# Patient Record
Sex: MALE | Race: BLACK OR AFRICAN AMERICAN | Employment: FULL TIME | URBAN - METROPOLITAN AREA
[De-identification: names, ages, dates, MRNs, and addresses within clinical notes are randomized per-mention and may not be internally consistent; named-entity substitution may affect disease eponyms.]

---

## 2018-03-23 ENCOUNTER — HOSPITAL ENCOUNTER (EMERGENCY)
Age: 48
Discharge: HOME | End: 2018-03-23
Attending: EMERGENCY MEDICINE
Payer: MEDICAID

## 2018-03-23 ENCOUNTER — APPOINTMENT (OUTPATIENT)
Dept: GENERAL RADIOLOGY | Age: 48
End: 2018-03-23
Payer: MEDICAID

## 2018-03-23 VITALS
DIASTOLIC BLOOD PRESSURE: 104 MMHG | BODY MASS INDEX: 33.51 KG/M2 | SYSTOLIC BLOOD PRESSURE: 162 MMHG | OXYGEN SATURATION: 97 % | HEIGHT: 73 IN | HEART RATE: 75 BPM | RESPIRATION RATE: 18 BRPM | TEMPERATURE: 98 F | WEIGHT: 252.87 LBS

## 2018-03-23 DIAGNOSIS — M25.512 ACUTE PAIN OF LEFT SHOULDER: ICD-10-CM

## 2018-03-23 DIAGNOSIS — R03.0 ELEVATED BLOOD PRESSURE READING: ICD-10-CM

## 2018-03-23 DIAGNOSIS — M54.50 ACUTE BILATERAL LOW BACK PAIN WITHOUT SCIATICA: Primary | ICD-10-CM

## 2018-03-23 PROCEDURE — 99282 EMERGENCY DEPT VISIT SF MDM: CPT

## 2018-03-23 PROCEDURE — 73030 X-RAY EXAM OF SHOULDER: CPT

## 2018-03-23 PROCEDURE — 72100 X-RAY EXAM L-S SPINE 2/3 VWS: CPT

## 2018-03-23 RX ORDER — HYDROCODONE BITARTRATE AND ACETAMINOPHEN 5; 325 MG/1; MG/1
1 TABLET ORAL
Qty: 12 TAB | Refills: 0 | Status: SHIPPED | OUTPATIENT
Start: 2018-03-23 | End: 2019-04-30

## 2018-03-23 RX ORDER — METHOCARBAMOL 750 MG/1
750 TABLET, FILM COATED ORAL 3 TIMES DAILY
Qty: 15 TAB | Refills: 0 | Status: SHIPPED | OUTPATIENT
Start: 2018-03-23 | End: 2018-03-28

## 2018-03-23 RX ORDER — IBUPROFEN 600 MG/1
600 TABLET ORAL
Qty: 20 TAB | Refills: 0 | Status: SHIPPED | OUTPATIENT
Start: 2018-03-23 | End: 2019-04-30

## 2018-03-23 NOTE — Clinical Note
Rest, alternate ice/moist heat, gentle stretching, massage. Avoid prolonged sitting. Follow up with primary care for recheck. Contact information provided for Orthopedist if symptoms persist.  Return to the Emergency Dept for any continued/worsening p ain.

## 2018-03-23 NOTE — LETTER
[address] 
[facility] EMERGENCY DEPT 
[address] [facility] [id number] [phone] Work/School Note Date: 3/23/2018 To Whom It May concern: 
 
Madelin Cousin was seen and treated today in the emergency room. He may return to work in 2 to 3 days, as symptoms improve. Sincerely, Francesca Elise

## 2018-03-24 NOTE — ED NOTES
Patient arrived to ED via ambulation with C/O left shoulder pain and lower back pain. Patient stated he was doing some heavy lifting while at work. Patients vital signs are stable at this time.  Waiting for evaluation from PA/MD.

## 2018-03-24 NOTE — ED PROVIDER NOTES
EMERGENCY DEPARTMENT HISTORY AND PHYSICAL EXAM      Date: 3/23/2018  Patient Name: Kai Triana    History of Presenting Illness     Chief Complaint   Patient presents with    Shoulder Pain     x2 weeks; worse at night    Back Pain     L side: patient reports lifing something heavy today and increased pain       History Provided By: Patient    HPI: Kai Triana, 50 y.o. male with no significant PMHx, presents ambulatory to the ED with cc of constant left sided lower back pain which started today after heavy lifting at work. Pt describes the pain as a tightness. His associated pain is moderate. He explains his pain is worse with standing. He also c/o left shoulder pain x 2 weeks. Pt reports no history of similar symptoms. He denies neck pain, rash, fever, cough, and SOB. Pt is otherwise without complaint. PCP: None    There are no other complaints, changes, or physical findings at this time. Past History     Past Medical History:  No past medical history on file. Past Surgical History:  No past surgical history on file. Family History:  No family history on file. Social History:  Social History   Substance Use Topics    Smoking status: Not on file    Smokeless tobacco: Not on file    Alcohol use Not on file       Allergies:  No Known Allergies      Review of Systems   Review of Systems   Constitutional: Negative for chills and fever. HENT: Negative for congestion, rhinorrhea and sore throat. Respiratory: Negative for cough and shortness of breath. Cardiovascular: Negative for chest pain and palpitations. Gastrointestinal: Negative for abdominal pain, diarrhea, nausea and vomiting. Endocrine: Negative for polydipsia, polyphagia and polyuria. Genitourinary: Negative for dysuria and hematuria. Musculoskeletal: Positive for arthralgias (left shoulder) and back pain (lower). Negative for neck pain and neck stiffness. Skin: Negative for rash and wound. Allergic/Immunologic: Negative for food allergies and immunocompromised state. Neurological: Negative for dizziness, weakness, numbness and headaches. Psychiatric/Behavioral: Negative for agitation and confusion. The patient is not nervous/anxious. Physical Exam   Physical Exam   Constitutional: He is oriented to person, place, and time. He appears well-developed and well-nourished. No distress. WDWN AA male, alert, in NAD   HENT:   Head: Normocephalic and atraumatic. Nose: Nose normal.   Mouth/Throat: Oropharynx is clear and moist. No oropharyngeal exudate. Eyes: Conjunctivae and EOM are normal. Right eye exhibits no discharge. Left eye exhibits no discharge. No scleral icterus. Neck: Normal range of motion. Neck supple. No JVD present. No tracheal deviation present. No thyromegaly present. No cervical spinal tenderness   Cardiovascular: Normal rate, regular rhythm and normal heart sounds. Pulmonary/Chest: Effort normal and breath sounds normal. No respiratory distress. He has no wheezes. Abdominal: Soft. There is no tenderness. No CVAT   Musculoskeletal: He exhibits tenderness. He exhibits no edema or deformity. Left shoulder: Decreased A/P ROM to left shoulder    Due to tenderness with palpation/movement, no deformity, no crepitus, no erythema/rash/lesion/heat. 2+ distal pulses, NVI, sensation grossly intact to light touch. Decreased A/P ROM to lumbar region with palpation and movement. No deformity noted. No midline spinal tenderness. Pt observed to ambulate without deficit. 2+ distal pulses, NVI. Sensation grossly intact to light touch. Lymphadenopathy:     He has no cervical adenopathy. Neurological: He is alert and oriented to person, place, and time. He exhibits normal muscle tone. Coordination normal.   Skin: Skin is warm and dry. No rash noted. He is not diaphoretic. No erythema. Psychiatric: He has a normal mood and affect.  His behavior is normal. Judgment normal.   Nursing note and vitals reviewed. Diagnostic Study Results     Radiologic Studies -   XR SHOULDER LT AP/LAT MIN 2 V   Final Result   EXAM:  XR SHOULDER LT AP/LAT MIN 2 V     INDICATION:   l shoulder pain.     COMPARISON: None.     FINDINGS: Three views of the left shoulder demonstrate no fracture, dislocation  or other acute abnormality.     IMPRESSION  IMPRESSION:  No acute abnormality. XR SPINE LUMB 2 OR 3 V   Final Result   INDICATION: low back pain     EXAM: Lumbar spine radiographs, 3 views.     COMPARISON:  None .     FINDINGS: A three-view examination of the lumbar spine reveals normal bony  alignment. Bone mineral content is normal for age . There is no obvious acute  fracture or dislocation. Vertebral body heights  and disc space heights   are  preserved. .  Pedicles and sacroiliac joints are intact, as are visualized  sacral foramina.     IMPRESSION  IMPRESSION: No acute bony abnormality of the lumbar spine. Medical Decision Making   I am the first provider for this patient. I reviewed the vital signs, available nursing notes, past medical history, past surgical history, family history and social history. Vital Signs-Reviewed the patient's vital signs. Patient Vitals for the past 12 hrs:   Temp Pulse Resp BP SpO2   03/23/18 1841 98 °F (36.7 °C) 75 18 (!) 162/104 97 %     Records Reviewed: Nursing Notes and Old Medical Records    Provider Notes (Medical Decision Making):   DDx: strain, DDD, rotator cuff disorder    ED Course:   Initial assessment performed. The patients presenting problems have been discussed, and they are in agreement with the care plan formulated and outlined with them. I have encouraged them to ask questions as they arise throughout their visit. Disposition:  DISCHARGE NOTE  8:44 PM  The patient has been re-evaluated and is ready for discharge. Reviewed available results with patient. Counseled patient on diagnosis and care plan.  Patient has expressed understanding, and all questions have been answered. Patient agrees with plan and agrees to follow up as recommended, or return to the ED if their symptoms worsen. Discharge instructions have been provided and explained to the patient, along with reasons to return to the ED. PLAN:  1. Current Discharge Medication List      START taking these medications    Details   methocarbamol (ROBAXIN) 750 mg tablet Take 1 Tab by mouth three (3) times daily for 5 days. Qty: 15 Tab, Refills: 0      HYDROcodone-acetaminophen (NORCO) 5-325 mg per tablet Take 1 Tab by mouth every six (6) hours as needed for Pain for up to 12 doses. Max Daily Amount: 4 Tabs. Qty: 12 Tab, Refills: 0    Associated Diagnoses: Acute bilateral low back pain without sciatica; Acute pain of left shoulder      ibuprofen (MOTRIN) 600 mg tablet Take 1 Tab by mouth every six (6) hours as needed for Pain for up to 20 doses. Qty: 20 Tab, Refills: 0           2. Follow-up Information     Follow up With Details Comments [address] MD  As needed [address]  [address] [facility],[address]  [facility] [address].  [phone]      [facility] EMERGENCY DEPT  If symptoms worsen [address] [place] [address]  [phone]        Return to ED if worse     Diagnosis     Clinical Impression:   1. Acute bilateral low back pain without sciatica    2. Acute pain of left shoulder    3. Elevated blood pressure reading        Attestations:    Attestation Note:  This note is prepared by JACKI [facility], acting as Scribe for [employer]: The scribe's documentation has been prepared under my direction and personally reviewed by me in its entirety. I confirm that the note above accurately reflects all work, treatment, procedures, and medical decision making performed by me.

## 2018-03-24 NOTE — ED NOTES
Discharge instructions given to patient by PA [facility]. Patient verbalized understanding of discharge instructions. Pt discharged without difficulty. Pt discharged in stable condition via ambulation, accompanied by family.

## 2018-10-26 ENCOUNTER — HOSPITAL ENCOUNTER (OUTPATIENT)
Dept: LAB | Age: 48
Discharge: HOME | End: 2018-10-26

## 2018-10-26 PROCEDURE — 80053 COMPREHEN METABOLIC PANEL: CPT | Performed by: NURSE PRACTITIONER

## 2018-10-26 PROCEDURE — 80061 LIPID PANEL: CPT | Performed by: NURSE PRACTITIONER

## 2018-10-27 LAB
ALBUMIN SERPL-MCNC: 3.9 G/DL (ref 3.5–5)
ALBUMIN/GLOB SERPL: 1 {RATIO} (ref 1.1–2.2)
ALP SERPL-CCNC: 82 U/L (ref 45–117)
ALT SERPL-CCNC: 42 U/L (ref 12–78)
ANION GAP SERPL CALC-SCNC: 9 MMOL/L (ref 5–15)
AST SERPL-CCNC: 19 U/L (ref 15–37)
BILIRUB SERPL-MCNC: 0.5 MG/DL (ref 0.2–1)
BUN SERPL-MCNC: 17 MG/DL (ref 6–20)
BUN/CREAT SERPL: 16 (ref 12–20)
CALCIUM SERPL-MCNC: 9.1 MG/DL (ref 8.5–10.1)
CHLORIDE SERPL-SCNC: 103 MMOL/L (ref 97–108)
CHOLEST SERPL-MCNC: 193 MG/DL
CO2 SERPL-SCNC: 26 MMOL/L (ref 21–32)
CREAT SERPL-MCNC: 1.05 MG/DL (ref 0.7–1.3)
GLOBULIN SER CALC-MCNC: 4.1 G/DL (ref 2–4)
GLUCOSE SERPL-MCNC: 94 MG/DL (ref 65–100)
HDLC SERPL-MCNC: 60 MG/DL
HDLC SERPL: 3.2 {RATIO} (ref 0–5)
LDLC SERPL CALC-MCNC: 103.8 MG/DL (ref 0–100)
LIPID PROFILE,FLP: ABNORMAL
POTASSIUM SERPL-SCNC: 4.8 MMOL/L (ref 3.5–5.1)
PROT SERPL-MCNC: 8 G/DL (ref 6.4–8.2)
SODIUM SERPL-SCNC: 138 MMOL/L (ref 136–145)
TRIGL SERPL-MCNC: 146 MG/DL (ref ?–150)
VLDLC SERPL CALC-MCNC: 29.2 MG/DL

## 2019-04-30 ENCOUNTER — HOSPITAL ENCOUNTER (EMERGENCY)
Age: 49
Discharge: HOME | End: 2019-04-30
Attending: EMERGENCY MEDICINE
Payer: MEDICAID

## 2019-04-30 ENCOUNTER — APPOINTMENT (OUTPATIENT)
Dept: GENERAL RADIOLOGY | Age: 49
End: 2019-04-30
Attending: EMERGENCY MEDICINE
Payer: MEDICAID

## 2019-04-30 VITALS
HEIGHT: 74 IN | TEMPERATURE: 98 F | WEIGHT: 246.47 LBS | OXYGEN SATURATION: 99 % | RESPIRATION RATE: 16 BRPM | SYSTOLIC BLOOD PRESSURE: 151 MMHG | BODY MASS INDEX: 31.63 KG/M2 | HEART RATE: 70 BPM | DIASTOLIC BLOOD PRESSURE: 97 MMHG

## 2019-04-30 DIAGNOSIS — M10.9 ACUTE GOUT INVOLVING TOE OF LEFT FOOT, UNSPECIFIED CAUSE: Primary | ICD-10-CM

## 2019-04-30 PROCEDURE — 73660 X-RAY EXAM OF TOE(S): CPT

## 2019-04-30 PROCEDURE — 99283 EMERGENCY DEPT VISIT LOW MDM: CPT

## 2019-04-30 PROCEDURE — 74011636637 HC RX REV CODE- 636/637: Performed by: EMERGENCY MEDICINE

## 2019-04-30 PROCEDURE — 74011250637 HC RX REV CODE- 250/637: Performed by: EMERGENCY MEDICINE

## 2019-04-30 RX ORDER — NAPROXEN 500 MG/1
500 TABLET ORAL 2 TIMES DAILY WITH MEALS
Qty: 20 TAB | Refills: 0 | Status: SHIPPED | OUTPATIENT
Start: 2019-04-30 | End: 2019-05-10

## 2019-04-30 RX ORDER — PREDNISONE 20 MG/1
60 TABLET ORAL ONCE
Status: COMPLETED | OUTPATIENT
Start: 2019-04-30 | End: 2019-04-30

## 2019-04-30 RX ORDER — PREDNISONE 20 MG/1
60 TABLET ORAL DAILY
Qty: 12 TAB | Refills: 0 | Status: SHIPPED | OUTPATIENT
Start: 2019-04-30 | End: 2019-05-04

## 2019-04-30 RX ORDER — IBUPROFEN 400 MG/1
800 TABLET ORAL
Status: COMPLETED | OUTPATIENT
Start: 2019-04-30 | End: 2019-04-30

## 2019-04-30 RX ADMIN — IBUPROFEN 800 MG: 400 TABLET, FILM COATED ORAL at 05:21

## 2019-04-30 RX ADMIN — PREDNISONE 60 MG: 20 TABLET ORAL at 05:21

## 2019-04-30 NOTE — ED TRIAGE NOTES
Ambulatory from home with C/O L foot pain/great toe area. Started 1700 yesterday, took Tylenol pain eased but returned at 0200. No injury.

## 2019-04-30 NOTE — ED PROVIDER NOTES
HPI  
 
52year old male without significant pmhx presents with left great toe pain since yesterday. Had a similar episode a year ago, lasted about 1 week, took NSAIDs, warm soaks and it resolved- it was red and hot. This episode feels similar. Never diagnosed with gout. No fevers. No injury. Pain is 10/10 currently. Took tylenol yesterday but none tonight. Drove himself here. History reviewed. No pertinent past medical history. History reviewed. No pertinent surgical history. History reviewed. No pertinent family history. Social History Socioeconomic History  Marital status:  Spouse name: Not on file  Number of children: Not on file  Years of education: Not on file  Highest education level: Not on file Occupational History  Not on file Social Needs  Financial resource strain: Not on file  Food insecurity:  
  Worry: Not on file Inability: Not on file  Transportation needs:  
  Medical: Not on file Non-medical: Not on file Tobacco Use  Smoking status: Never Smoker Substance and Sexual Activity  Alcohol use: Never Frequency: Never  Drug use: Never  Sexual activity: Not on file Lifestyle  Physical activity:  
  Days per week: Not on file Minutes per session: Not on file  Stress: Not on file Relationships  Social connections:  
  Talks on phone: Not on file Gets together: Not on file Attends [employer] service: Not on file Active member of club or organization: Not on file Attends meetings of clubs or organizations: Not on file Relationship status: Not on file  Intimate partner violence:  
  Fear of current or ex partner: Not on file Emotionally abused: Not on file Physically abused: Not on file Forced sexual activity: Not on file Other Topics Concern  Not on file Social History Narrative  Not on file ALLERGIES: Patient has no known allergies. Review of Systems Constitutional: Negative for fever. HENT: Negative for congestion. Eyes: Negative for visual disturbance. Respiratory: Negative for cough and shortness of breath. Cardiovascular: Negative for chest pain. Gastrointestinal: Negative for abdominal pain. Genitourinary: Negative for dysuria. Musculoskeletal: Positive for joint swelling. Skin: Negative for rash. Neurological: Negative for headaches. Psychiatric/Behavioral: Negative for dysphoric mood. Vitals:  
 04/30/19 3090 BP: (!) 147/102 Pulse: 68 Resp: 16 Temp: 98.3 °F (36.8 °C) SpO2: 99% Weight: 111.8 kg (246 lb 7.6 oz) Height: 6' 2\" (1.88 m) Physical Exam  
Constitutional: He is oriented to person, place, and time. He appears well-developed and well-nourished. No distress. HENT:  
Head: Normocephalic and atraumatic. Mouth/Throat: No oropharyngeal exudate. Eyes: Pupils are equal, round, and reactive to light. Right eye exhibits no discharge. Left eye exhibits no discharge. No scleral icterus. Neck: Normal range of motion. Neck supple. No JVD present. Cardiovascular: Normal rate, regular rhythm and normal heart sounds. No murmur heard. Pulmonary/Chest: Effort normal and breath sounds normal. No stridor. No respiratory distress. He has no wheezes. He has no rales. He exhibits no tenderness. Abdominal: Soft. Bowel sounds are normal. He exhibits no distension and no mass. There is no tenderness. There is no rebound and no guarding. Musculoskeletal: Normal range of motion. He exhibits edema (Mcp joint on left, pain to light touch, no erythema or warmth) and tenderness. Neurological: He is oriented to person, place, and time. Skin: Skin is warm and dry. Capillary refill takes less than 2 seconds. No rash noted. Psychiatric: He has a normal mood and affect. His behavior is normal. Judgment and thought content normal.  
  
 
MDM Procedures suspect gout clinically. Will get xray of toe. Start prednisone/NSAIDs. Does not have pcp. Will refer.

## 2019-04-30 NOTE — ED NOTES
4148:  MD reviewed discharge instructions and options with patient and patient verbalized understanding. RN reviewed discharge instructions using teachback method. Pt ambulated to exit without difficulty and in no signs of acute distress escorted by self, and self will drive home. No complaints or needs expressed at this time. Patient was counseled on medications prescribed at discharge. VSS, verbalized relief from most intense pain. Patient to call PCP provided on discharge papers in the morning for appointment.

## 2019-04-30 NOTE — DISCHARGE INSTRUCTIONS
Patient Education      I suspect your symptoms are related to gout. I am treating with Naprosyn twice daily for pain and Prednisone daily for inflammation. Follow up with primary care if your symptoms are not improving- return here if your symptoms/condition is worsening. Gout: Care Instructions  Your Care Instructions    Gout is a form of arthritis caused by a buildup of uric acid crystals in a joint. It causes sudden attacks of pain, swelling, redness, and stiffness, usually in one joint, especially the big toe. Gout usually comes on without a cause. But it can be brought on by drinking alcohol (especially beer) or eating seafood and red meat. Taking certain medicines, such as diuretics or aspirin, also can bring on an attack of gout. Taking your medicines as prescribed and following up with your doctor regularly can help you avoid gout attacks in the future. Follow-up care is a key part of your treatment and safety. Be sure to make and go to all appointments, and call your doctor if you are having problems. It's also a good idea to know your test results and keep a list of the medicines you take. How can you care for yourself at home? · If the joint is swollen, put ice or a cold pack on the area for 10 to 20 minutes at a time. Put a thin cloth between the ice and your skin. · Prop up the sore limb on a pillow when you ice it or anytime you sit or lie down during the next 3 days. Try to keep it above the level of your heart. This will help reduce swelling. · Rest sore joints. Avoid activities that put weight or strain on the joints for a few days. Take short rest breaks from your regular activities during the day. · Take your medicines exactly as prescribed. Call your doctor if you think you are having a problem with your medicine. · Take pain medicines exactly as directed. ? If the doctor gave you a prescription medicine for pain, take it as prescribed.   ? If you are not taking a prescription pain medicine, ask your doctor if you can take an over-the-counter medicine. · Eat less seafood and red meat. · Check with your doctor before drinking alcohol. · Losing weight, if you are overweight, may help reduce attacks of gout. But do not go on a [employer]. \" Losing a lot of weight in a short amount of time can cause a gout attack. When should you call for help? Call your doctor now or seek immediate medical care if:    · You have a fever.     · The joint is so painful you cannot use it.     · You have sudden, unexplained swelling, redness, warmth, or severe pain in one or more joints.    Watch closely for changes in your health, and be sure to contact your doctor if:    · You have joint pain.     · Your symptoms get worse or are not improving after 2 or 3 days. Where can you learn more? Go to http://hyunPinPayperla.info/. Enter P004 in the search box to learn more about \"Gout: Care Instructions. \"  Current as of: Shaylee 10, 2018  Content Version: 11.9  © 8683-4737 [email or web address]. Care instructions adapted under license by [employer] (which disclaims liability or warranty for this information). If you have questions about a medical condition or this instruction, always ask your healthcare professional. [address] any warranty or liability for your use of this information. Patient Education        Purine-Restricted Diet: Care Instructions  Your Care Instructions    Purines are substances that are found in some foods. Your body turns purines into uric acid. High levels of uric acid can cause gout, which is a form of arthritis that causes pain and inflammation in joints. You may be able to help control the amount of uric acid in your body by limiting high-purine foods in your diet. Follow-up care is a key part of your treatment and safety. Be sure to make and go to all appointments, and call your doctor if you are having problems.  It's also a good idea to know your test results and keep a list of the medicines you take. How can you care for yourself at home? · Plan your meals and snacks around foods that are low in purines and are safe for you to eat. These foods include:  ? Green vegetables and tomatoes. ? Fruits. ? Whole-grain breads, rice, and cereals. ? Eggs, peanut butter, and nuts. ? Low-fat milk, cheese, and other milk products. ? Popcorn. ? Gelatin desserts, chocolate, cocoa, and cakes and sweets, in small amounts. · You can eat certain foods that are medium-high in purines, but eat them only once in a while. These foods include:  ? Legumes, such as dried beans and dried peas. You can have 1 cup cooked legumes each day. ? Asparagus, cauliflower, spinach, mushrooms, and green peas. ? Fish and seafood (other than very high-purine seafood). ? Oatmeal, wheat bran, and wheat germ. · Limit very high-purine foods, including:  ? Organ meats, such as liver, kidneys, sweetbreads, and brains. ? Meats, including romero, beef, pork, and lamb. ? Game meats and any other meats in large amounts. ? Anchovies, sardines, herring, mackerel, and scallops. ? Gravy. ? Beer. Where can you learn more? Go to http://hyun-perla.info/. Enter F448 in the search box to learn more about \"Purine-Restricted Diet: Care Instructions. \"  Current as of: March 28, 2018  Content Version: 11.9  © 3861-2242 [employer]. Care instructions adapted under license by [employer] (which disclaims liability or warranty for this information). If you have questions about a medical condition or this instruction, always ask your healthcare professional. [address] any warranty or liability for your use of this information.

## 2019-05-17 ENCOUNTER — OFFICE VISIT (OUTPATIENT)
Dept: INTERNAL MEDICINE CLINIC | Age: 49
End: 2019-05-17

## 2019-05-17 VITALS
DIASTOLIC BLOOD PRESSURE: 88 MMHG | RESPIRATION RATE: 18 BRPM | SYSTOLIC BLOOD PRESSURE: 131 MMHG | HEIGHT: 74 IN | OXYGEN SATURATION: 95 % | WEIGHT: 250.3 LBS | TEMPERATURE: 98.1 F | BODY MASS INDEX: 32.12 KG/M2 | HEART RATE: 73 BPM

## 2019-05-17 DIAGNOSIS — Z12.11 SCREEN FOR COLON CANCER: ICD-10-CM

## 2019-05-17 DIAGNOSIS — R94.31 ABNORMAL EKG: ICD-10-CM

## 2019-05-17 DIAGNOSIS — E66.9 OBESITY (BMI 30.0-34.9): ICD-10-CM

## 2019-05-17 DIAGNOSIS — Z00.00 PREVENTATIVE HEALTH CARE: Primary | ICD-10-CM

## 2019-05-17 DIAGNOSIS — L30.4 INTERTRIGO: ICD-10-CM

## 2019-05-17 RX ORDER — CLOTRIMAZOLE AND BETAMETHASONE DIPROPIONATE 10; .64 MG/G; MG/G
CREAM TOPICAL 2 TIMES DAILY
Qty: 45 G | Refills: 11 | Status: SHIPPED | OUTPATIENT
Start: 2019-05-17 | End: 2019-10-17 | Stop reason: SDUPTHER

## 2019-05-17 RX ORDER — CLOTRIMAZOLE AND BETAMETHASONE DIPROPIONATE 10; .64 MG/G; MG/G
CREAM TOPICAL 2 TIMES DAILY
Qty: 15 G | Refills: 0 | Status: SHIPPED | OUTPATIENT
Start: 2019-05-17 | End: 2019-05-17 | Stop reason: SDUPTHER

## 2019-05-17 NOTE — PROGRESS NOTES
1. Have you been to the ER, urgent care clinic since your last visit? Hospitalized since your last visit? Yes When: 4-30-19 Where: [facility] Reason for visit: left foot pain 2. Have you seen or consulted any other health care providers outside of the [address] [place] [address] since your last visit? Include any pap smears or colon screening.  No

## 2019-05-17 NOTE — PROGRESS NOTES
SPORTS MEDICINE AND PRIMARY CARE  Deloris Escamilla MD, [address] [address]  Phone:  [phone]  Fax: [phone]    Chief Complaint   Patient presents with   [address]    ED Follow-up       SUBJECTIVE:    Bernadine Hidalgo is a 52 y.o. male Patient returns today with a history of obesity. He is seen as a new patient for evaluation and ongoing care. Patient still has a twinge of pain in his great toe periodically and is seen for evaluation. Past Medical History:   Diagnosis Date    Obesity (BMI 30.0-34. 9)     Preventative health care      History reviewed. No pertinent surgical history.   No Known Allergies    REVIEW OF SYSTEMS:  General: negative for - chills or fever  ENT: negative for - headaches, nasal congestion or tinnitus  Respiratory: negative for - cough, hemoptysis, shortness of breath or wheezing  Cardiovascular : negative for - chest pain, edema, palpitations or shortness of breath  Gastrointestinal: negative for - abdominal pain, blood in stools, heartburn or nausea/vomiting  Genito-Urinary: no dysuria, trouble voiding, or hematuria  Musculoskeletal: negative for - gait disturbance, joint pain, joint stiffness or joint swelling  Neurological: no TIA or stroke symptoms  Hematologic: no bruises, no bleeding, no swollen glands  Integument: no lumps, mole changes, nail changes or rash  Endocrine:no malaise/lethargy or unexpected weight changes      Social History     Socioeconomic History    Marital status:      Spouse name: Not on file    Number of children: Not on file    Years of education: Not on file    Highest education level: Not on file   Tobacco Use    Smoking status: Never Smoker    Smokeless tobacco: Never Used   Substance and Sexual Activity    Alcohol use: Never     Frequency: Never    Drug use: Never    Sexual activity: Yes     Partners: Female     Birth control/protection: None     Family History   Problem Relation Age of Onset    Diabetes Mother      Habits:  He is a lifetime nonsmoker, non drinker, non drug abuser. Social History:  The patient is , lives with his wife. He was born in [place]. He has been in the [address] [place] [address],[address] since 2005. They have three children, two daughters ages 9 and 11, and a son age 3. He completed his ALIA at [employer]. He was in [facility] for five years. 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 Zoroastrianism background is Yazidism. Family History:  Father 76, alive and well. Mother 67 with diabetes. Six brothers and four sisters are alive and well. OBJECTIVE:     Visit Vitals  /88   Pulse 73   Temp 98.1 °F (36.7 °C) (Oral)   Resp 18   Ht 6' 2\" (1.88 m)   Wt 250 lb 4.8 oz (113.5 kg)   SpO2 95%   BMI 32.14 kg/m²     CONSTITUTIONAL: well , well nourished, appears age appropriate  EYES: perrla, eom intact  ENMT:moist mucous membranes, pharynx clear  NECK: supple. Thyroid normal  RESPIRATORY: Chest: clear bilaterally  CARDIOVASCULAR: Heart: regular rate and rhythm  GASTROINTESTINAL: Abdomen: soft, bowel sounds active  HEMATOLOGIC: no pathological lymph nodes palpated  MUSCULOSKELETAL: Extremities: no edema, pulse 1+   INTEGUMENT: No unusual rashes or suspicious skin lesions noted. Nails appear normal.  NEUROLOGIC: non-focal exam   MENTAL STATUS: alert and oriented, appropriate affect     No visits with results within 3 Month(s) from this visit.    Latest known visit with results is:   Hospital Outpatient Visit on 10/26/2018   Component Date Value Ref Range Status    Sodium 10/26/2018 138  136 - 145 mmol/L Final    Potassium 10/26/2018 4.8  3.5 - 5.1 mmol/L Final    Chloride 10/26/2018 103  97 - 108 mmol/L Final    CO2 10/26/2018 26  21 - 32 mmol/L Final    Anion gap 10/26/2018 9  5 - 15 mmol/L Final    Glucose 10/26/2018 94  65 - 100 mg/dL Final    BUN 10/26/2018 17  6 - 20 MG/DL Final    Creatinine 10/26/2018 1.05  0.70 - 1.30 MG/DL Final    BUN/Creatinine ratio 10/26/2018 16  12 - 20   Final    GFR est AA 10/26/2018 >60  >60 ml/min/1.73m2 Final    GFR est non-AA 10/26/2018 >60  >60 ml/min/1.73m2 Final    Comment: Estimated GFR is calculated using the IDMS-traceable Modification of Diet in Renal Disease (MDRD) Study equation, reported for both  Americans (GFRAA) and non- Americans (GFRNA), and normalized to 1.73m2 body surface area. The physician must decide which value applies to the patient. The MDRD study equation should only be used in individuals age 25 or older. It has not been validated for the following: pregnant women, patients with serious comorbid conditions, or on certain medications, or persons with extremes of body size, muscle mass, or nutritional status.  Calcium 10/26/2018 9.1  8.5 - 10.1 MG/DL Final    Bilirubin, total 10/26/2018 0.5  0.2 - 1.0 MG/DL Final    ALT (SGPT) 10/26/2018 42  12 - 78 U/L Final    AST (SGOT) 10/26/2018 19  15 - 37 U/L Final    Alk. phosphatase 10/26/2018 82  45 - 117 U/L Final    Protein, total 10/26/2018 8.0  6.4 - 8.2 g/dL Final    Albumin 10/26/2018 3.9  3.5 - 5.0 g/dL Final    Globulin 10/26/2018 4.1* 2.0 - 4.0 g/dL Final    A-G Ratio 10/26/2018 1.0* 1.1 - 2.2   Final    LIPID PROFILE 10/26/2018        Final    Cholesterol, total 10/26/2018 193  <200 MG/DL Final    Triglyceride 10/26/2018 146  <150 MG/DL Final    Based on NCEP-ATP III:  Triglycerides <150 mg/dL  is considered normal, 150-199 mg/dL  borderline high,  200-499 mg/dL high and  greater than or equal to 500 mg/dL very high.  HDL Cholesterol 10/26/2018 60  MG/DL Final    Based on NCEP ATP III, HDL Cholesterol <40 mg/dL is considered low and >60 mg/dL is elevated.     LDL, calculated 10/26/2018 103.8* 0 - 100 MG/DL Final    Comment: Based on the NCEP-ATP: LDL-C concentrations are considered  optimal <100 mg/dL,  near optimal/above Normal 100-129 mg/dL  Borderline High: 130-159, High: 160-189 mg/dL  Very High: Greater than or equal to 190 mg/dL      VLDL, calculated 10/26/2018 29.2  MG/DL Final    CHOL/HDL Ratio 10/26/2018 3.2  0 - 5.0   Final       ASSESSMENT:   1. Preventative health care    2. Obesity (BMI 30.0-34.9)    3. Intertrigo    4. Screen for colon cancer      Patient's medical status is generally stable. The question of gout is raised for the great toe on the left and for that reason will ask for a uric acid. He complains of intertrigo and we will give him Lotrisone cream to use. If that does not resolve it he will let us know. His BMI represents obesity and we encouraged physical activity 30 minutes five days a week and a heart healthy, weight reducing diet. BP control is at goal.    Lab studies will be sent to him in the mail. He will be back to see us once a year. He is advised he can walk in to see us any time he has an issue and unable to get an appointment and that we use [facility]. Discussed the patient's BMI with him. The BMI follow up plan is as follows:     dietary management education, guidance, and counseling  encourage exercise  monitor weight  prescribed dietary intake    I have discussed the diagnosis with the patient and the intended plan as seen in the  orders above. The patient understands and agees with the plan. The patient has   received an after visit summary and questions were answered concerning  future plans  Patient labs and/or xrays were reviewed  Past records were reviewed.     PLAN:  .  Orders Placed This Encounter    URINALYSIS W/ RFLX MICROSCOPIC    CBC WITH AUTOMATED DIFF    METABOLIC PANEL, COMPREHENSIVE    LIPID PANEL    PROSTATE SPECIFIC AG    HEMOGLOBIN A1C WITH EAG    URIC ACID    REFERRAL TO GASTROENTEROLOGY    AMB POC EKG ROUTINE W/ 12 LEADS, INTER & REP    DISCONTD: clotrimazole-betamethasone (LOTRISONE) topical cream    clotrimazole-betamethasone (LOTRISONE) topical cream       Follow-up and Dispositions    · Return in about 1 year (around 5/17/2020). ATTENTION:   This medical record was transcribed using an electronic medical records system. Although proofread, it may and can contain electronic and spelling errors. Other human spelling and other errors may be present. Corrections may be executed at a later time. Please feel free to contact us for any clarifications as needed.

## 2019-05-17 NOTE — PATIENT INSTRUCTIONS
Body Mass Index: Care Instructions Your Care Instructions Body mass index (BMI) can help you see if your weight is raising your risk for health problems. It uses a formula to compare how much you weigh with how tall you are. · A BMI lower than 18.5 is considered underweight. · A BMI between 18.5 and 24.9 is considered healthy. · A BMI between 25 and 29.9 is considered overweight. A BMI of 30 or higher is considered obese. If your BMI is in the normal range, it means that you have a lower risk for weight-related health problems. If your BMI is in the overweight or obese range, you may be at increased risk for weight-related health problems, such as high blood pressure, heart disease, stroke, arthritis or joint pain, and diabetes. If your BMI is in the underweight range, you may be at increased risk for health problems such as fatigue, lower protection (immunity) against illness, muscle loss, bone loss, hair loss, and hormone problems. BMI is just one measure of your risk for weight-related health problems. You may be at higher risk for health problems if you are not active, you eat an unhealthy diet, or you drink too much alcohol or use tobacco products. Follow-up care is a key part of your treatment and safety. Be sure to make and go to all appointments, and call your doctor if you are having problems. It's also a good idea to know your test results and keep a list of the medicines you take. How can you care for yourself at home? · Practice healthy eating habits. This includes eating plenty of fruits, vegetables, whole grains, lean protein, and low-fat dairy. · If your doctor recommends it, get more exercise. Walking is a good choice. Bit by bit, increase the amount you walk every day. Try for at least 30 minutes on most days of the week. · Do not smoke. Smoking can increase your risk for health problems.  If you need help quitting, talk to your doctor about stop-smoking programs and medicines. These can increase your chances of quitting for good. · Limit alcohol to 2 drinks a day for men and 1 drink a day for women. Too much alcohol can cause health problems. If you have a BMI higher than 25 · Your doctor may do other tests to check your risk for weight-related health problems. This may include measuring the distance around your waist. A waist measurement of more than 40 inches in men or 35 inches in women can increase the risk of weight-related health problems. · Talk with your doctor about steps you can take to stay healthy or improve your health. You may need to make lifestyle changes to lose weight and stay healthy, such as changing your diet and getting regular exercise. If you have a BMI lower than 18.5 · Your doctor may do other tests to check your risk for health problems. · Talk with your doctor about steps you can take to stay healthy or improve your health. You may need to make lifestyle changes to gain or maintain weight and stay healthy, such as getting more healthy foods in your diet and doing exercises to build muscle. Where can you learn more? Go to http://hyun-perla.info/. Enter S176 in the search box to learn more about \"Body Mass Index: Care Instructions. \" Current as of: October 13, 2016 Content Version: 11.4 © 3669-3931 Healthwise, Incorporated. Care instructions adapted under license by [employer] (which disclaims liability or warranty for this information). If you have questions about a medical condition or this instruction, always ask your healthcare professional. [address] any warranty or liability for your use of this information.

## 2019-05-19 LAB
ALBUMIN SERPL-MCNC: 4.3 G/DL (ref 3.5–5.5)
ALBUMIN/GLOB SERPL: 1.3 {RATIO} (ref 1.2–2.2)
ALP SERPL-CCNC: 81 IU/L (ref 39–117)
ALT SERPL-CCNC: 41 IU/L (ref 0–44)
APPEARANCE UR: CLEAR
AST SERPL-CCNC: 21 IU/L (ref 0–40)
BASOPHILS # BLD AUTO: 0 X10E3/UL (ref 0–0.2)
BASOPHILS NFR BLD AUTO: 1 %
BILIRUB SERPL-MCNC: 0.5 MG/DL (ref 0–1.2)
BILIRUB UR QL STRIP: NEGATIVE
BUN SERPL-MCNC: 13 MG/DL (ref 6–24)
BUN/CREAT SERPL: 13 (ref 9–20)
CALCIUM SERPL-MCNC: 9.6 MG/DL (ref 8.7–10.2)
CHLORIDE SERPL-SCNC: 102 MMOL/L (ref 96–106)
CHOLEST SERPL-MCNC: 213 MG/DL (ref 100–199)
CO2 SERPL-SCNC: 21 MMOL/L (ref 20–29)
COLOR UR: YELLOW
CREAT SERPL-MCNC: 1.02 MG/DL (ref 0.76–1.27)
EOSINOPHIL # BLD AUTO: 0.1 X10E3/UL (ref 0–0.4)
EOSINOPHIL NFR BLD AUTO: 2 %
ERYTHROCYTE [DISTWIDTH] IN BLOOD BY AUTOMATED COUNT: 15 % (ref 12.3–15.4)
EST. AVERAGE GLUCOSE BLD GHB EST-MCNC: 143 MG/DL
GLOBULIN SER CALC-MCNC: 3.2 G/DL (ref 1.5–4.5)
GLUCOSE SERPL-MCNC: 128 MG/DL (ref 65–99)
GLUCOSE UR QL: NEGATIVE
HBA1C MFR BLD: 6.6 % (ref 4.8–5.6)
HCT VFR BLD AUTO: 43.8 % (ref 37.5–51)
HDLC SERPL-MCNC: 49 MG/DL
HGB BLD-MCNC: 15 G/DL (ref 13–17.7)
HGB UR QL STRIP: NEGATIVE
IMM GRANULOCYTES # BLD AUTO: 0 X10E3/UL (ref 0–0.1)
IMM GRANULOCYTES NFR BLD AUTO: 0 %
KETONES UR QL STRIP: NEGATIVE
LDLC SERPL CALC-MCNC: 118 MG/DL (ref 0–99)
LEUKOCYTE ESTERASE UR QL STRIP: NEGATIVE
LYMPHOCYTES # BLD AUTO: 1.9 X10E3/UL (ref 0.7–3.1)
LYMPHOCYTES NFR BLD AUTO: 56 %
MCH RBC QN AUTO: 27.3 PG (ref 26.6–33)
MCHC RBC AUTO-ENTMCNC: 34.2 G/DL (ref 31.5–35.7)
MCV RBC AUTO: 80 FL (ref 79–97)
MICRO URNS: NORMAL
MONOCYTES # BLD AUTO: 0.3 X10E3/UL (ref 0.1–0.9)
MONOCYTES NFR BLD AUTO: 10 %
MORPHOLOGY BLD-IMP: ABNORMAL
NEUTROPHILS # BLD AUTO: 1 X10E3/UL (ref 1.4–7)
NEUTROPHILS NFR BLD AUTO: 31 %
NITRITE UR QL STRIP: NEGATIVE
PH UR STRIP: 5.5 [PH] (ref 5–7.5)
PLATELET # BLD AUTO: 266 X10E3/UL (ref 150–379)
POTASSIUM SERPL-SCNC: 4.8 MMOL/L (ref 3.5–5.2)
PROT SERPL-MCNC: 7.5 G/DL (ref 6–8.5)
PROT UR QL STRIP: NEGATIVE
PSA SERPL-MCNC: 0.7 NG/ML (ref 0–4)
RBC # BLD AUTO: 5.49 X10E6/UL (ref 4.14–5.8)
SODIUM SERPL-SCNC: 137 MMOL/L (ref 134–144)
SP GR UR: 1.02 (ref 1–1.03)
TRIGL SERPL-MCNC: 232 MG/DL (ref 0–149)
URATE SERPL-MCNC: 6.8 MG/DL (ref 3.7–8.6)
UROBILINOGEN UR STRIP-MCNC: 0.2 MG/DL (ref 0.2–1)
VLDLC SERPL CALC-MCNC: 46 MG/DL (ref 5–40)
WBC # BLD AUTO: 3.4 X10E3/UL (ref 3.4–10.8)

## 2019-05-20 DIAGNOSIS — E11.9 TYPE 2 DIABETES MELLITUS WITHOUT COMPLICATION, WITHOUT LONG-TERM CURRENT USE OF INSULIN (HCC): Primary | ICD-10-CM

## 2019-05-20 RX ORDER — ATORVASTATIN CALCIUM 10 MG/1
10 TABLET, FILM COATED ORAL DAILY
Qty: 30 TAB | Refills: 11 | Status: SHIPPED | OUTPATIENT
Start: 2019-05-20 | End: 2020-05-18 | Stop reason: SDUPTHER

## 2019-05-20 RX ORDER — METFORMIN HYDROCHLORIDE 500 MG/1
500 TABLET, EXTENDED RELEASE ORAL
Qty: 30 TAB | Refills: 11 | Status: SHIPPED | OUTPATIENT
Start: 2019-05-20 | End: 2020-05-18 | Stop reason: SDUPTHER

## 2019-05-20 NOTE — PROGRESS NOTES
Please call the patient and advise dm,meds referral diabetic teaching if he desires rto 3 months to see me

## 2019-05-24 ENCOUNTER — OFFICE VISIT (OUTPATIENT)
Dept: INTERNAL MEDICINE CLINIC | Age: 49
End: 2019-05-24

## 2019-05-24 VITALS
RESPIRATION RATE: 15 BRPM | HEART RATE: 74 BPM | WEIGHT: 249.6 LBS | BODY MASS INDEX: 32.03 KG/M2 | HEIGHT: 74 IN | DIASTOLIC BLOOD PRESSURE: 88 MMHG | SYSTOLIC BLOOD PRESSURE: 133 MMHG | TEMPERATURE: 98 F | OXYGEN SATURATION: 96 %

## 2019-05-24 DIAGNOSIS — E66.9 OBESITY (BMI 30.0-34.9): Primary | ICD-10-CM

## 2019-05-24 DIAGNOSIS — E11.9 TYPE 2 DIABETES MELLITUS WITHOUT COMPLICATION, WITHOUT LONG-TERM CURRENT USE OF INSULIN (HCC): ICD-10-CM

## 2019-05-24 DIAGNOSIS — L30.4 INTERTRIGO: ICD-10-CM

## 2019-05-24 NOTE — PROGRESS NOTES
Chief Complaint   Patient presents with    Abnormal Lab Results     follow up      1. Have you been to the ER, urgent care clinic since your last visit? Hospitalized since your last visit? No    2. Have you seen or consulted any other health care providers outside of the [address] [place] [address] since your last visit? Include any pap smears or colon screening.  No

## 2019-05-31 ENCOUNTER — HOSPITAL ENCOUNTER (OUTPATIENT)
Dept: NON INVASIVE DIAGNOSTICS | Age: 49
Discharge: HOME | End: 2019-05-31
Attending: INTERNAL MEDICINE
Payer: MEDICAID

## 2019-05-31 DIAGNOSIS — R94.31 ABNORMAL EKG: ICD-10-CM

## 2019-05-31 LAB
ECHO AO ROOT DIAM: 2.99 CM
ECHO LV INTERNAL DIMENSION DIASTOLIC: 3.34 CM (ref 4.2–5.9)
ECHO LV INTERNAL DIMENSION SYSTOLIC: 3.01 CM
ECHO LV IVSD: 0.96 CM (ref 0.6–1)
ECHO LV MASS 2D: 125.7 G (ref 88–224)
ECHO LV POSTERIOR WALL DIASTOLIC: 1.25 CM (ref 0.6–1)
ECHO RV INTERNAL DIMENSION: 2.97 CM
ECHO RV TAPSE: 1.82 CM (ref 1.5–2)

## 2019-05-31 PROCEDURE — 93306 TTE W/DOPPLER COMPLETE: CPT

## 2019-06-01 DIAGNOSIS — E78.5 DYSLIPIDEMIA: ICD-10-CM

## 2019-06-01 DIAGNOSIS — E11.9 TYPE 2 DIABETES MELLITUS WITHOUT COMPLICATION, WITHOUT LONG-TERM CURRENT USE OF INSULIN (HCC): ICD-10-CM

## 2019-06-01 DIAGNOSIS — R94.31 ABNORMAL EKG: Primary | ICD-10-CM

## 2019-06-01 DIAGNOSIS — E66.9 OBESITY (BMI 30.0-34.9): ICD-10-CM

## 2019-06-12 DIAGNOSIS — E11.11 TYPE 2 DIABETES MELLITUS WITH KETOACIDOTIC COMA, WITHOUT LONG-TERM CURRENT USE OF INSULIN (HCC): Primary | ICD-10-CM

## 2019-06-12 RX ORDER — INSULIN PUMP SYRINGE, 3 ML
EACH MISCELLANEOUS
Qty: 1 KIT | Refills: 0 | Status: CANCELLED | OUTPATIENT
Start: 2019-06-12

## 2019-06-12 RX ORDER — BLOOD-GLUCOSE METER
EACH MISCELLANEOUS
Qty: 1 EACH | Refills: 0 | Status: SHIPPED | OUTPATIENT
Start: 2019-06-12

## 2019-06-14 ENCOUNTER — HOSPITAL ENCOUNTER (OUTPATIENT)
Dept: NUCLEAR MEDICINE | Age: 49
Discharge: HOME | End: 2019-06-14
Attending: INTERNAL MEDICINE
Payer: MEDICAID

## 2019-06-14 DIAGNOSIS — E78.5 DYSLIPIDEMIA: ICD-10-CM

## 2019-06-14 DIAGNOSIS — R94.31 ABNORMAL EKG: ICD-10-CM

## 2019-06-14 DIAGNOSIS — E11.9 TYPE 2 DIABETES MELLITUS WITHOUT COMPLICATION, WITHOUT LONG-TERM CURRENT USE OF INSULIN (HCC): ICD-10-CM

## 2019-06-14 DIAGNOSIS — E66.9 OBESITY (BMI 30.0-34.9): ICD-10-CM

## 2019-06-14 LAB
STRESS ANGINA INDEX: 0
STRESS BASELINE HR: 75 BPM
STRESS ESTIMATED WORKLOAD: 12.2 METS
STRESS EXERCISE DUR MIN: NORMAL
STRESS PEAK DIAS BP: 86 MMHG
STRESS PEAK SYS BP: 180 MMHG
STRESS PERCENT HR ACHIEVED: 92 %
STRESS POST PEAK HR: 157 BPM
STRESS RATE PRESSURE PRODUCT: NORMAL BPM*MMHG
STRESS SR DUKE TREADMILL SCORE: 0
STRESS ST DEPRESSION: 0 MM
STRESS ST ELEVATION: 0 MM
STRESS TARGET HR: 171 BPM

## 2019-06-14 PROCEDURE — 93017 CV STRESS TEST TRACING ONLY: CPT

## 2019-06-14 PROCEDURE — A9500 TC99M SESTAMIBI: HCPCS

## 2019-06-14 RX ORDER — SODIUM CHLORIDE 0.9 % (FLUSH) 0.9 %
20 SYRINGE (ML) INJECTION
Status: COMPLETED | OUTPATIENT
Start: 2019-06-14 | End: 2019-06-14

## 2019-06-14 RX ADMIN — Medication 20 ML: at 10:46

## 2019-06-28 ENCOUNTER — HOSPITAL ENCOUNTER (OUTPATIENT)
Age: 49
Setting detail: OUTPATIENT SURGERY
Discharge: HOME | End: 2019-06-28
Attending: INTERNAL MEDICINE | Admitting: INTERNAL MEDICINE
Payer: MEDICAID

## 2019-06-28 ENCOUNTER — ANESTHESIA (OUTPATIENT)
Dept: ENDOSCOPY | Age: 49
End: 2019-06-28
Payer: MEDICAID

## 2019-06-28 ENCOUNTER — ANESTHESIA EVENT (OUTPATIENT)
Dept: ENDOSCOPY | Age: 49
End: 2019-06-28
Payer: MEDICAID

## 2019-06-28 VITALS
WEIGHT: 241 LBS | BODY MASS INDEX: 30.93 KG/M2 | HEIGHT: 74 IN | OXYGEN SATURATION: 96 % | RESPIRATION RATE: 16 BRPM | HEART RATE: 69 BPM | SYSTOLIC BLOOD PRESSURE: 122 MMHG | TEMPERATURE: 97.6 F | DIASTOLIC BLOOD PRESSURE: 88 MMHG

## 2019-06-28 LAB
GLUCOSE BLD STRIP.AUTO-MCNC: 101 MG/DL (ref 65–100)
SERVICE CMNT-IMP: ABNORMAL

## 2019-06-28 PROCEDURE — 74011250636 HC RX REV CODE- 250/636

## 2019-06-28 PROCEDURE — 74011250636 HC RX REV CODE- 250/636: Performed by: INTERNAL MEDICINE

## 2019-06-28 PROCEDURE — 76060000031 HC ANESTHESIA FIRST 0.5 HR: Performed by: INTERNAL MEDICINE

## 2019-06-28 PROCEDURE — 82962 GLUCOSE BLOOD TEST: CPT

## 2019-06-28 PROCEDURE — 76040000019: Performed by: INTERNAL MEDICINE

## 2019-06-28 RX ORDER — SODIUM CHLORIDE 9 MG/ML
100 INJECTION, SOLUTION INTRAVENOUS CONTINUOUS
Status: DISCONTINUED | OUTPATIENT
Start: 2019-06-28 | End: 2019-06-28 | Stop reason: HOSPADM

## 2019-06-28 RX ORDER — ATROPINE SULFATE 0.1 MG/ML
0.5 INJECTION INTRAVENOUS
Status: DISCONTINUED | OUTPATIENT
Start: 2019-06-28 | End: 2019-06-28 | Stop reason: HOSPADM

## 2019-06-28 RX ORDER — SODIUM CHLORIDE 0.9 % (FLUSH) 0.9 %
5-40 SYRINGE (ML) INJECTION AS NEEDED
Status: DISCONTINUED | OUTPATIENT
Start: 2019-06-28 | End: 2019-06-28 | Stop reason: HOSPADM

## 2019-06-28 RX ORDER — DIPHENHYDRAMINE HYDROCHLORIDE 50 MG/ML
50 INJECTION, SOLUTION INTRAMUSCULAR; INTRAVENOUS ONCE
Status: DISCONTINUED | OUTPATIENT
Start: 2019-06-28 | End: 2019-06-28 | Stop reason: HOSPADM

## 2019-06-28 RX ORDER — SODIUM CHLORIDE 0.9 % (FLUSH) 0.9 %
5-40 SYRINGE (ML) INJECTION EVERY 8 HOURS
Status: DISCONTINUED | OUTPATIENT
Start: 2019-06-28 | End: 2019-06-28 | Stop reason: HOSPADM

## 2019-06-28 RX ORDER — FENTANYL CITRATE 50 UG/ML
100 INJECTION, SOLUTION INTRAMUSCULAR; INTRAVENOUS ONCE
Status: DISCONTINUED | OUTPATIENT
Start: 2019-06-28 | End: 2019-06-28 | Stop reason: HOSPADM

## 2019-06-28 RX ORDER — EPINEPHRINE 0.1 MG/ML
1 INJECTION INTRACARDIAC; INTRAVENOUS
Status: DISCONTINUED | OUTPATIENT
Start: 2019-06-28 | End: 2019-06-28 | Stop reason: HOSPADM

## 2019-06-28 RX ORDER — LORAZEPAM 2 MG/ML
2 INJECTION INTRAMUSCULAR AS NEEDED
Status: DISCONTINUED | OUTPATIENT
Start: 2019-06-28 | End: 2019-06-28 | Stop reason: HOSPADM

## 2019-06-28 RX ORDER — MIDAZOLAM HYDROCHLORIDE 1 MG/ML
1-2 INJECTION, SOLUTION INTRAMUSCULAR; INTRAVENOUS
Status: DISCONTINUED | OUTPATIENT
Start: 2019-06-28 | End: 2019-06-28 | Stop reason: HOSPADM

## 2019-06-28 RX ORDER — LIDOCAINE HYDROCHLORIDE 20 MG/ML
INJECTION, SOLUTION EPIDURAL; INFILTRATION; INTRACAUDAL; PERINEURAL AS NEEDED
Status: DISCONTINUED | OUTPATIENT
Start: 2019-06-28 | End: 2019-06-28 | Stop reason: HOSPADM

## 2019-06-28 RX ORDER — NALOXONE HYDROCHLORIDE 0.4 MG/ML
0.4 INJECTION, SOLUTION INTRAMUSCULAR; INTRAVENOUS; SUBCUTANEOUS
Status: DISCONTINUED | OUTPATIENT
Start: 2019-06-28 | End: 2019-06-28 | Stop reason: HOSPADM

## 2019-06-28 RX ORDER — LIDOCAINE HYDROCHLORIDE 20 MG/ML
5 SOLUTION OROPHARYNGEAL AS NEEDED
Status: DISCONTINUED | OUTPATIENT
Start: 2019-06-28 | End: 2019-06-28 | Stop reason: HOSPADM

## 2019-06-28 RX ORDER — PROPOFOL 10 MG/ML
INJECTION, EMULSION INTRAVENOUS AS NEEDED
Status: DISCONTINUED | OUTPATIENT
Start: 2019-06-28 | End: 2019-06-28 | Stop reason: HOSPADM

## 2019-06-28 RX ORDER — FLUMAZENIL 0.1 MG/ML
0.2 INJECTION INTRAVENOUS
Status: DISCONTINUED | OUTPATIENT
Start: 2019-06-28 | End: 2019-06-28 | Stop reason: HOSPADM

## 2019-06-28 RX ORDER — FENTANYL CITRATE 50 UG/ML
25 INJECTION, SOLUTION INTRAMUSCULAR; INTRAVENOUS
Status: DISCONTINUED | OUTPATIENT
Start: 2019-06-28 | End: 2019-06-28 | Stop reason: HOSPADM

## 2019-06-28 RX ORDER — DEXTROSE MONOHYDRATE AND SODIUM CHLORIDE 5; .9 G/100ML; G/100ML
100 INJECTION, SOLUTION INTRAVENOUS CONTINUOUS
Status: DISCONTINUED | OUTPATIENT
Start: 2019-06-28 | End: 2019-06-28 | Stop reason: HOSPADM

## 2019-06-28 RX ORDER — MIDAZOLAM HYDROCHLORIDE 1 MG/ML
5 INJECTION, SOLUTION INTRAMUSCULAR; INTRAVENOUS
Status: DISCONTINUED | OUTPATIENT
Start: 2019-06-28 | End: 2019-06-28 | Stop reason: HOSPADM

## 2019-06-28 RX ORDER — DEXTROMETHORPHAN/PSEUDOEPHED 2.5-7.5/.8
1.2 DROPS ORAL
Status: DISCONTINUED | OUTPATIENT
Start: 2019-06-28 | End: 2019-06-28 | Stop reason: HOSPADM

## 2019-06-28 RX ADMIN — SODIUM CHLORIDE 100 ML/HR: 900 INJECTION, SOLUTION INTRAVENOUS at 11:02

## 2019-06-28 RX ADMIN — PROPOFOL 300 MG: 10 INJECTION, EMULSION INTRAVENOUS at 12:33

## 2019-06-28 RX ADMIN — LIDOCAINE HYDROCHLORIDE 40 MG: 20 INJECTION, SOLUTION EPIDURAL; INFILTRATION; INTRACAUDAL; PERINEURAL at 12:16

## 2019-06-28 NOTE — H&P
G I Procedure Note           Endoscopy History and Physical               Dr. Henry Flaherty Office [address] [place] [address] Office  [address]  [id number]  [id number]    1970  52 y.o.  male      Date of Procedure:   Preoperative Diagnosis:       Procedure:    6/28/2019           SCREENING                              Procedure(s):  COLONOSCOPY      Gastroenterologist:  Anesthesia:           Rona Zee MD                               MAC            History and procedure indication:  Mark Sierra is a 52 y.o. BLACK OR  male who presents with: SCREENING   including the additional history of Screening ,Screening for colon cancer,,        Past Medical History:   Diagnosis Date    Abnormal EKG 05/17/2019    DM2 (diabetes mellitus, type 2) ([address] 05/17/2019    Dyslipidemia     Obesity (BMI 30.0-34. 9)     Obesity (BMI 30.0-34. 9)     Preventative health care       Prior to Admission medications    Medication Sig Start Date End Date Taking? Authorizing Provider   atorvastatin (LIPITOR) 10 mg tablet Take 1 Tab by mouth daily. 5/20/19  Yes Rell Alvarez MD   metFORMIN ER (GLUCOPHAGE XR) 500 mg tablet Take 1 Tab by mouth daily (with dinner). 5/20/19  Yes Rell Alvarez MD   clotrimazole-betamethasone (LOTRISONE) topical cream Apply  to affected area two (2) times a day. 5/17/19  Yes Rell Alvarez MD   Blood-Glucose Meter (ONETOUCH VERIO FLEX) misc Used to test blood sugars 3 times daily 6/12/19   Rell Alvarez MD   glucose blood VI test strips (ONETOUCH VERIO) strip Used to test blood sugar 3 times daily. 6/12/19   Rell Alvarez MD     No Known Allergies    History reviewed. No pertinent surgical history.   Family History   Problem Relation Age of Onset    Diabetes Mother       Social History     Tobacco Use    Smoking status: Never Smoker    Smokeless tobacco: Never Used   Substance Use Topics    Alcohol use: Never     Frequency: Never                                                    PHYSICAL EXAM   There were no vitals taken for this visit. General appearance:  alert, well appearing, and in no distress  Mental status:  normal mood, behavior, speech, dress, motor activity and thought processes  Nose:      normal and patent, no erythema, discharge or polyps  Mouth:- mucous membranes moist, pharynx normal without lesions                  [x]  No Loose teeth      []    Loose teeth  Finger opening:  []1     []1.5    [] 2     [] 2.5     [x] 3      [] 3.5     [] 4   Mallampati:         [] Class 1     [x] Class 2    [] Class 3      [] Class 4      Neck - supple,      [x] Full ROM [] Decreased ROM  [] Short Neck no significant adenopathy    Chest - clear to auscultation, no wheezes, rales or rhonchi, symmetric air entry  Heart: normal rate, regular rhythm, normal S1, S2, no murmurs, rubs, clicks or gallops  Abdomen: abdomen soft, bowel sounds  [x] normal  [] increased  [] hypoactive                       [] no tenderness  [] epigastric tenderness  [] LLQ tenderness   [] RLQ tenderness                      No masses, organomegaly or guarding. Rectal exam: negative without mass, lesions or tenderness  Extremities: peripheral pulses normal, no pedal edema, no clubbing or cyanosis  Neurologic: Alert and oriented to person, place, and time; normal strength and tone. Normal symmetric reflexes  Normal gait:                                      Assessement:                                 Pre op dx:  SCREENING   Additional medical problems list below   Patient Active Problem List   Diagnosis Code    Preventative health care Z00.00    Obesity (BMI 30.0-34. 9) E66.9    Screen for colon cancer Z12.11    Intertrigo L30.4    DM2 (diabetes mellitus, type 2) (HCC) E11.9    Dyslipidemia E78.5    Abnormal EKG R94.31 This note documentation was performed prior to this planned procedure       after a history and physical was performed in the office. Date: 6 13 19 Immediate update no changes in H&P                          Pre Procedure Evaluation (per anesthesia or per h&p)                                                Sedation/Assessment:                                                                                               Mallampati Classification                            []Class 1                    []Class 2                    [] Class 3                  [] Class 4                                              ASA classfication         []     Class I: Normally healthy         []     Class II: Patient with mild systemic disease (e.g. hypertension)         []     Class III: Patient with severe systemic disease (e.g. CHF), non-decompensated         []     Class IV: Patient with severe systemic disease, decompensated         []     Class V: Moribund patient, survival unlikely                     Plan:  []  Egd                                 [x] Colonoscopy                               [] with Moderate Sedation /Conscious Sedation                                 [x] MAC          Patient stable for planned procedure. See orders.      Lucia Grey MD

## 2019-06-28 NOTE — DISCHARGE INSTRUCTIONS
Endoscopy Discharge Instructions     Dr. Gene Sandra office                                            NAME: Arnav Wang [id number]    AGE:  52 y.o. YOB: 1970                                                              FINAL Discharge Procedure and Diagnosis:       Procedure(s):  COLONOSCOPY       FINDINGS:     Diverticulosis  Int hemorrhoids                                          MEDICATIONS    [x] CONTINUE CURRENT MEDICATIONS     [] NEW MEDICATIONS           1.    2.    3.         Testing   Schedule              Colonoscopy Screening                                   Recommendations       []  Repeat colonoscopy in 6-12 month 2nd        to Inadequate  prep    []  Repeat colonoscopy in 3 years    []  Repeat colonoscopy in 5 years    [x]  Repeat colonoscopy in 10 years         New additional  Tests  Call the office   [phone] for the appointment time      []      []      []                                     YOUR NEXT APPOINTMENT WITH DR Richardson Maciel:                                                                                                                                [x]   None follow up with pcp   []  1 week       []   2 week    []  1 month    Always keep Rajiv Sue MD for regular medical follow up                                                                                                                         If you had a colonoscopy the \"C\" indicates specific instructions        x                                           Diet Instructions :   Ordinarily you may resume your previous diet but your initial diet should be       Light your discharge nurse will go over this with you. Large meals can cause  abdominal discomfort after these procedures.                                                                            Specific Diet Recommendations:        [x] High fiber diet. https://www.[employer]/. com/diets/        [] GERD diet: avoid fried and fatty foods, peppermint, chocolate, alcohol,               coffee, citrus fruits and juices, and tomato products. Avoid lying down for            2 to 3  hours after eating. https://www.worley.com/. com/diets/            []  FODMAP DIET  Deathit.nl              []  All diets eg high fiber, gastroparesis. , weight loss , gluten free             1. [employer]              2.  https://www.[employer]. MyoKardia/diets/           __x__  Donita Coe may feel quite tired and need to rest and recuperate for several hours    following these procedures. __x__  Due to the fact that sedation was administered for this procedure, do not drive,   operate machinery or sign legal documents for the next 24 hours. __x__  Mild abdominal pain may be experienced after your procedure, but is should   disappear after several hours. Notify your physician if you have persistent pain,   tenderness or abdominal distension. __x__  C    Many patients for the first few hours following the exam may experience         belching or passing gas through the rectum. Walking may help to relieve        distention and gas pains. A warm bath or shower will often help with abdominal  cramping.                                                                                            __x__   Donita Coe may return to your normal routine tomorrow, according to how you feel        and depending on your doctors instructions. Be sure to call your doctor to make  an appointment for a post-surgery check-up on the date your doctor has   requested. __x__ C     Rectal bleeding or spotting in small amounts may occur with the first bowel   movement following a colonoscopy or sigmoidoscopy.  If a large amount of blood is noted call immediately     __x__  You may experience a numbness or lack of sensation in throat. If present, do not     eat or drink. Before eating, test your ability to drink with small sips of water. Y     You may try clear liquids or soups. If you tolerate these, you may then eat solid     food which is not greasy or spicy. __x__ C     IF POLYPS REMOVED: Avoid any blood thinning medication such as plavix,   aspirin or coumadin  NSAIDS (like advil or alleve) for 7 days. __x__  Notify your physician if you cough or vomit blood or experience chest pain. Your biopsy or testing result should be available in 7-10 days                                                                                                                      Prescription will be electronically sent to your pharmacy you must     let your nurse know your pharmacy:                                                                                                                                          [address] [place] [address]. TO HELP ENSURE A SMOOTH RECOVERY,       IT IS IMPORTANT TO FOLLOW THEM. _x___Pamphlet /Educational Information provided for diagnostic findings     Additional education information can assessed at the sites below:   [facility]   http://www.digestive. niddk.nih.gov/ddiseases/a-z.asp      Web MD patient information                                                                                                Signature of individual given instructions :   Date: 6/28/2019

## 2019-06-28 NOTE — ANESTHESIA PREPROCEDURE EVALUATION
Relevant Problems   No relevant active problems       Anesthetic History   No history of anesthetic complications            Review of Systems / Medical History  Patient summary reviewed, nursing notes reviewed and pertinent labs reviewed    Pulmonary  Within defined limits                 Neuro/Psych   Within defined limits           Cardiovascular              Hyperlipidemia    Exercise tolerance: >4 METS  Comments: Estimated left ventricular ejection fraction is 61 - 65%.     GI/Hepatic/Renal  Within defined limits              Endo/Other    Diabetes: type 2    Obesity     Other Findings              Physical Exam    Airway  Mallampati: III  TM Distance: 4 - 6 cm  Neck ROM: normal range of motion   Mouth opening: Normal     Cardiovascular  Regular rate and rhythm,  S1 and S2 normal,  no murmur, click, rub, or gallop             Dental  No notable dental hx       Pulmonary  Breath sounds clear to auscultation               Abdominal  GI exam deferred       Other Findings            Anesthetic Plan    ASA: 2  Anesthesia type: total IV anesthesia and MAC          Induction: Intravenous  Anesthetic plan and risks discussed with: Patient

## 2019-06-28 NOTE — ANESTHESIA POSTPROCEDURE EVALUATION
Procedure(s):  COLONOSCOPY.    total IV anesthesia    Anesthesia Post Evaluation        Patient location during evaluation: PACU  Note status: Adequate. Level of consciousness: responsive to verbal stimuli and sleepy but conscious  Pain management: satisfactory to patient  Airway patency: patent  Anesthetic complications: no  Cardiovascular status: acceptable  Respiratory status: acceptable  Hydration status: acceptable  Comments: +Post-Anesthesia Evaluation and Assessment    Patient: Frances Vicente MRN: [id number]  SSN: [id number]   YOB: 1970  Age: 52 y.o. Sex: male      Cardiovascular Function/Vital Signs    /88   Pulse 69   Temp 36.4 °C (97.6 °F)   Resp 16   Ht 6' 2\" (1.88 m)   Wt 109.3 kg (241 lb)   SpO2 96%   BMI 30.94 kg/m²     Patient is status post Procedure(s):  COLONOSCOPY. Nausea/Vomiting: Controlled. Postoperative hydration reviewed and adequate. Pain:  Pain Scale 1: Numeric (0 - 10) (06/28/19 1300)  Pain Intensity 1: 0 (06/28/19 1300)   Managed. Neurological Status: At baseline. Mental Status and Level of Consciousness: Arousable. Pulmonary Status:   O2 Device: Room air (06/28/19 1300)   Adequate oxygenation and airway patent. Complications related to anesthesia: None    Post-anesthesia assessment completed. No concerns. Signed By: Manju Mata DO    6/28/2019  Post anesthesia nausea and vomiting:  controlled      Vitals Value Taken Time   /88 6/28/2019  1:00 PM   Temp 36.4 °C (97.6 °F) 6/28/2019 12:45 PM   Pulse 68 6/28/2019  1:01 PM   Resp 14 6/28/2019  1:01 PM   SpO2 96 % 6/28/2019  1:01 PM   Vitals shown include unvalidated device data.

## 2019-06-28 NOTE — ROUTINE PROCESS
Demond Shields  1970  [id number]    Situation:  Verbal report received from: Orlando Matthew RN  Procedure: Procedure(s):  COLONOSCOPY    Background:    Preoperative diagnosis: SCREENING  Postoperative diagnosis: Diverticulosis, hemorrhoids    :  Dr. Blanchard  Assistant(s): Endoscopy Technician-1: Marta SOOD  Endoscopy RN-1: Van Sousa RN    Specimens: * No specimens in log *  H. Pylori  no    Assessment:  Intra-procedure medications     Anesthesia gave intra-procedure sedation and medications, see anesthesia flow sheet yes    Intravenous fluids: NS@ KVO     Vital signs stable     Abdominal assessment: round and soft     Recommendation:  Discharge patient per MD order.   Family or Yolanda Zac, cousin  Permission to share finding with family or friend no

## 2019-06-28 NOTE — PERIOP NOTES
Anesthesia reports 300mg Propofol, 40mg Lidocaine and 300mL NS given during procedure. Received report from anesthesia staff on vital signs and status of patient.

## 2019-06-28 NOTE — PROCEDURES
G I Procedure Note              COLONOSCOPY   Dr. Ilir Westbrook office   [address]                                   [phone]                                  [id number]   1970                                      52 y.o.                                    male      Procedure Date: 6/28/2019      [x]  Anesthesia MAC                                                                                Pre Op Diagnosis:                     1. SCREENING                                                                                                                                                                        Post Op Diagnosis:                   1.   Diverticulosis, hemorrhoids                                                                  H&p completed: Yes            Anesthesia Assessment: Performed prior to procedure:      No change  Anesthesia Plan: Performed prior to procedure:                   No change       Medications: See Reviewed List and Reconcilation           Informed consent was obtained     Risk Statement:  Prior to the procedure the risks were explained to the patient and/or to the family including but not limited to perforation, bleeding, adverse drug reaction, aspiration, and even the need for possible surgery. A colonoscopy exam is not 100% accurate which may be related to preparation or blind spots during the exam.The possibility that an abnormality and /or cancer could be missed was also discussed as well as alternative x-ray options. Instrument:    Olympus adult Videocolonoscope                                   Immediate Procedure Reassessment Completed     With the patient in the left lateral position, a rectal examination was performed and the findings were:negative, stool guaiac negative.   The Olympus Video colonoscope was inserted under direct vision into the rectum. The colonoscope was passed from the rectum to the cecum, which was identified by the ileocecal valve. The colon findings demonstrated:    ANUS: Anal exam reveals no masses or hemorrhoids, sphincter tone is normal.     RECTUM: Rectal exam reveals no masses or hemorrhoids. SIGMOID COLON: The patient was noted to have diverticulosis. The mucosa is normal with good vascular pattern and without ulcers or polyps. DESCENDING COLON:  The mucosa is normal with good vascular pattern and without ulcers or polyps. SPLENIC FLEXURE: The splenic flexure is normal.     TRANSVERSE COLON: The mucosa is normal with good vascular pattern and without ulcers or polyps. HEPATIC FLEXURE: The hepatic flexure is normal.     ASCENDING COLON: The mucosa is normal with good vascular pattern and without ulcers or polyps. CECUM:  The ileocecal valve appears normal.     Scatttered diverticulosis was noted. .     The colonoscope was slowly withdrawn >6 minute period and the instrument was retroflexed in the rectum. The rectal findings were:Protruding lesions:     -Internal Hemorrhoids  The patient tolerated the entire procedure well. Blood Loss nil  No complications  Anesthesia  MAC  No crystalloids  No Implants  Assistants : per nursing documentation team members     Colon preparation was good    Recommendations:     - For colon cancer screening in this average-risk patient, colonoscopy may be repeated in 10 years.       Copies sent to   Paola Vogel MD  CC:  Nanda Pallas, MD

## 2019-08-30 ENCOUNTER — HOSPITAL ENCOUNTER (EMERGENCY)
Age: 49
Discharge: HOME | End: 2019-08-30
Attending: EMERGENCY MEDICINE
Payer: MEDICAID

## 2019-08-30 ENCOUNTER — APPOINTMENT (OUTPATIENT)
Dept: VASCULAR SURGERY | Age: 49
End: 2019-08-30
Attending: EMERGENCY MEDICINE
Payer: MEDICAID

## 2019-08-30 ENCOUNTER — APPOINTMENT (OUTPATIENT)
Dept: GENERAL RADIOLOGY | Age: 49
End: 2019-08-30
Attending: EMERGENCY MEDICINE
Payer: MEDICAID

## 2019-08-30 VITALS
SYSTOLIC BLOOD PRESSURE: 133 MMHG | HEART RATE: 56 BPM | RESPIRATION RATE: 16 BRPM | OXYGEN SATURATION: 98 % | DIASTOLIC BLOOD PRESSURE: 69 MMHG | TEMPERATURE: 98 F

## 2019-08-30 DIAGNOSIS — M25.561 ACUTE PAIN OF RIGHT KNEE: Primary | ICD-10-CM

## 2019-08-30 DIAGNOSIS — M71.21 SYNOVIAL CYST OF RIGHT POPLITEAL SPACE: ICD-10-CM

## 2019-08-30 PROCEDURE — 74011250637 HC RX REV CODE- 250/637: Performed by: EMERGENCY MEDICINE

## 2019-08-30 PROCEDURE — 99283 EMERGENCY DEPT VISIT LOW MDM: CPT

## 2019-08-30 PROCEDURE — 73562 X-RAY EXAM OF KNEE 3: CPT

## 2019-08-30 PROCEDURE — 93971 EXTREMITY STUDY: CPT

## 2019-08-30 RX ORDER — NAPROXEN 250 MG/1
500 TABLET ORAL
Status: COMPLETED | OUTPATIENT
Start: 2019-08-30 | End: 2019-08-30

## 2019-08-30 RX ORDER — NAPROXEN 500 MG/1
500 TABLET ORAL 2 TIMES DAILY WITH MEALS
Qty: 20 TAB | Refills: 0 | Status: SHIPPED | OUTPATIENT
Start: 2019-08-30 | End: 2020-01-06 | Stop reason: SDUPTHER

## 2019-08-30 RX ADMIN — NAPROXEN 500 MG: 250 TABLET ORAL at 21:08

## 2019-08-31 NOTE — ED TRIAGE NOTES
Patient presents to to the emergency department reporting right knee pain and slight loss of range of motion. Patient states he had a lot of walking a few days ago. Patient reports history of gout and has had relief with the medication prescribed for gout.

## 2019-08-31 NOTE — ED NOTES
[facility] reviewed discharge instructions with the patient. The patient verbalized understanding. Patient ambulated out of the emergency department without assistance. Patient remains in pain, but is in no apparent distress.

## 2019-08-31 NOTE — ED PROVIDER NOTES
51-year-old male presents to the emergency room for evaluation of right knee pain. Pain began yesterday and is continued into today. Patient describes the pain as medium. He does not give a quality to the pain. Pain does not radiate. Pain is located to the right anterior portion of the knee and behind the knee. No fever or chills. No injury or trauma. No difficulty ambulating. Pain does increase with flexion of the knee. No chest pain, shortness breath or difficulty breathing. Patient took a medicine for gout earlier and that did provide some relief. No other known precipitating events. Social history:  Non-smoker no alcohol      The history is provided by the patient. No  was used. Past Medical History:   Diagnosis Date    Abnormal EKG 05/17/2019    [facility]/17/2019    Dyslipidemia     Obesity (BMI 30.0-34. 9)     Obesity (BMI 30.0-34. 9)     Preventative health care        Past Surgical History:   Procedure Laterality Date    COLONOSCOPY N/A 6/28/2019    COLONOSCOPY performed by Cyala Bassett MD at [facility] END[facility]COPY         Family History:   Problem Relation Age of Onset    Diabetes Mother        Social History     Socioeconomic History    Marital status:      Spouse name: Not on file    Number of children: Not on file    Years of education: Not on file    Highest education level: Not on file   Occupational History    Not on file   Social Needs    Financial resource strain: Not on file    Food insecurity:     Worry: Not on file     Inability: Not on file    Transportation needs:     Medical: Not on file     Non-medical: Not on file   Tobacco Use    Smoking status: Never Smoker    Smokeless tobacco: Never Used   Substance and Sexual Activity    Alcohol use: Never     Frequency: Never    Drug use: Never    Sexual activity: Yes     Partners: Female     Birth control/protection: None   Lifestyle    Physical activity: Days per week: Not on file     Minutes per session: Not on file    Stress: Not on file   Relationships    Social connections:     Talks on phone: Not on file     Gets together: Not on file     Attends Zoroastrianism service: Not on file     Active member of club or organization: Not on file     Attends meetings of clubs or organizations: Not on file     Relationship status: Not on file    Intimate partner violence:     Fear of current or ex partner: Not on file     Emotionally abused: Not on file     Physically abused: Not on file     Forced sexual activity: Not on file   Other Topics Concern    Not on file   Social History Narrative    Habits:  He is a lifetime nonsmoker, non drinker, non drug abuser.         Social History:  The patient is , lives with his wife. He was born in [place]. He has been in the [address] [place] [address] since 2005. They have three children, two daughters ages 9 and 11, and a son age 3. He completed his ALIA at [employer]. He was in [facility] for five years. 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 Orthodoxy background is [employer].         Family History:  Father 76, alive and well. Mother 67 with diabetes. Six brothers and four sisters are alive and well. ALLERGIES: Patient has no known allergies. Review of Systems   Constitutional: Negative for chills and fever. Respiratory: Negative for chest tightness and shortness of breath. Cardiovascular: Negative for chest pain. Gastrointestinal: Negative for abdominal pain, nausea and vomiting. Musculoskeletal: Negative for myalgias. Skin: Negative for rash. Vitals:    08/30/19 1942   Pulse: 77   SpO2: 99%            Physical Exam   Constitutional: He is oriented to person, place, and time. He appears well-developed and well-nourished. HENT:   Head: Normocephalic and atraumatic. Eyes: Pupils are equal, round, and reactive to light. Conjunctivae and EOM are normal.   Neck: Normal range of motion. Neck supple. Cardiovascular: Normal rate and regular rhythm. Pulmonary/Chest: Effort normal and breath sounds normal. No respiratory distress. Musculoskeletal: He exhibits tenderness. He exhibits no edema. Right Knee: no redness, warmth, swelling. No obvious deformity. Pt able to lift leg off of exam bed. Pt able to flex and extend fully  Pt tender to palpation, posterior aspect of knee. And right condylar region. No palpable effusion. Joint stable. No ligament laxity with valgus or varus stress. Anterior drawer negative. 2+ dorsalis pedis, 2+ posterior tibialis. Ankle, foot, hip, tib/fib  Non tender to palpation. Neurological: He is alert and oriented to person, place, and time. No cranial nerve deficit. He exhibits normal muscle tone. Coordination normal.   Skin: Skin is warm and dry. No rash noted. Psychiatric: He has a normal mood and affect. His behavior is normal. Judgment and thought content normal.   Nursing note and vitals reviewed. MDM  Number of Diagnoses or Management Options  Acute pain of right knee:   Synovial cyst of right popliteal space:   Diagnosis management comments: 43-year-old male presenting for right knee pain. The knee is not red hot or swollen. No open wounds. No signs of cellulitis. No signs of septic joint. He is afebrile. No associated chest pain or shortness of breath. He is ambulatory full weightbearing  Plan: X-ray vascular study for DVT versus Baker's cyst    9:34 PM  No acute process on xray. Popliteal fossa on vascular. No dvt. No signs of infection on exam.  Will treat with rest ice and elevation, nsaid and ortho follow-up    Patient's results have been reviewed with them.   Patient and/or family have verbally conveyed their understanding and agreement of the patient's signs, symptoms, diagnosis, treatment and prognosis and additionally agree to follow up as recommended or return to the Emergency Room should their condition change prior to follow-up. Discharge instructions have also been provided to the patient with some educational information regarding their diagnosis as well a list of reasons why they would want to return to the ER prior to their follow-up appointment should their condition change. Amount and/or Complexity of Data Reviewed  Discuss the patient with other providers: yes (ER attendingObinna)    Patient Progress  Patient progress: stable         Procedures      Pt case including HPI, PE, and all available lab and radiology results has been discussed with attending physician. Opportunity to evaluate patient has been provided to ER attending. Discharge and prescription plan has been agreed upon.

## 2019-08-31 NOTE — DISCHARGE INSTRUCTIONS
Naproxen: 1 pill every 12 hours for pain. Keep knee elevated for next 48 hours. Place ice in a bag and apply to  for 20 minutes 4-5 times a day for next 48 hours. Return to ER for any redness, warmth, increased swelling  fever over 101. Baker's Cyst: Care Instructions  Your Care Instructions    A Baker's cyst is a swelling behind the knee. It may cause pain or stiffness when you bend your knee or straighten it all the way. Baker's cysts are also called popliteal cysts. If you have arthritis or another condition that is the cause of the Baker's cyst, your doctor may treat that condition. A Baker's cyst may go away on its own. If not, or if it is causing a lot of discomfort, your doctor may drain the fluid that has built up behind the knee. In some cases, a Baker's cyst is removed in surgery. There are things you can do at home, such as staying off your leg, to reduce the swelling and pain. Follow-up care is a key part of your treatment and safety. Be sure to make and go to all appointments, and call your doctor if you are having problems. It's also a good idea to know your test results and keep a list of the medicines you take. How can you care for yourself at home? · Rest your knee as much as possible. · Ask your doctor if you can take an over-the-counter pain medicine, such as acetaminophen (Tylenol), ibuprofen (Advil, Motrin), or naproxen (Aleve). Be safe with medicines. Read and follow all instructions on the label. · Use a cane, a crutch, a walker, or another device if you need help to get around. These can help rest your knees. · If you have an elastic bandage, make sure it is snug but not so tight that your leg is numb, tingles, or swells below the bandage. Ask your doctor if you can loosen the bandage if it is too tight. · Follow your doctor's instructions about how much weight you can put on your knee. · Stay at a healthy weight. Being overweight puts extra strain on your knee.   When should you call for help? Call 911 anytime you think you may need emergency care. For example, call if:    · You have chest pain, are short of breath, or you cough up blood.    Call your doctor now or seek immediate medical care if:    · You have new or worse pain.     · Your foot is cool or pale or changes color.     · You have tingling, weakness, or numbness in your foot or toes.     · You have signs of a blood clot in your leg (called a deep vein thrombosis), such as:  ? Pain in your calf, back of the knee, thigh, or groin. ? Redness or swelling in your leg.    Watch closely for changes in your health, and be sure to contact your doctor if:    · You do not get better as expected. Where can you learn more? Go to http://hyun-perla.info/. Enter G189 in the search box to learn more about \"Baker's Cyst: Care Instructions. \"  Current as of: September 20, 2018  Content Version: 12.1  © 3053-7714 [employer]. Care instructions adapted under license by [employer] (which disclaims liability or warranty for this information). If you have questions about a medical condition or this instruction, always ask your healthcare professional. [place] [address] any warranty or liability for your use of this information. Patient Education        Knee Pain or Injury: Care Instructions  Your Care Instructions    Injuries are a common cause of knee problems. Sudden (acute) injuries may be caused by a direct blow to the knee. They can also be caused by abnormal twisting, bending, or falling on the knee. Pain, bruising, or swelling may be severe, and may start within minutes of the injury. Overuse is another cause of knee pain. Other causes are climbing stairs, kneeling, and other activities that use the knee. Everyday wear and tear, especially as you get older, also can cause knee pain.   Rest, along with home treatment, often relieves pain and allows your knee to heal. If you have a serious knee injury, you may need tests and treatment. Follow-up care is a key part of your treatment and safety. Be sure to make and go to all appointments, and call your doctor if you are having problems. It's also a good idea to know your test results and keep a list of the medicines you take. How can you care for yourself at home? · Be safe with medicines. Read and follow all instructions on the label. ? If the doctor gave you a prescription medicine for pain, take it as prescribed. ? If you are not taking a prescription pain medicine, ask your doctor if you can take an over-the-counter medicine. · Rest and protect your knee. Take a break from any activity that may cause pain. · Put ice or a cold pack on your knee for 10 to 20 minutes at a time. Put a thin cloth between the ice and your skin. · Prop up a sore knee on a pillow when you ice it or anytime you sit or lie down for the next 3 days. Try to keep it above the level of your heart. This will help reduce swelling. · If your knee is not swollen, you can put moist heat, a heating pad, or a warm cloth on your knee. · If your doctor recommends an elastic bandage, sleeve, or other type of support for your knee, wear it as directed. · Follow your doctor's instructions about how much weight you can put on your leg. Use a cane, crutches, or a walker as instructed. · Follow your doctor's instructions about activity during your healing process. If you can do mild exercise, slowly increase your activity. · Reach and stay at a healthy weight. Extra weight can strain the joints, especially the knees and hips, and make the pain worse. Losing even a few pounds may help. When should you call for help? Call 911 anytime you think you may need emergency care. For example, call if:    · You have symptoms of a blood clot in your lung (called a pulmonary embolism). These may include:  ? Sudden chest pain. ? Trouble breathing. ? Coughing up blood.  Call your doctor now or seek immediate medical care if:    · You have severe or increasing pain.     · Your leg or foot turns cold or changes color.     · You cannot stand or put weight on your knee.     · Your knee looks twisted or bent out of shape.     · You cannot move your knee.     · You have signs of infection, such as:  ? Increased pain, swelling, warmth, or redness. ? Red streaks leading from the knee. ? Pus draining from a place on your knee. ? A fever.     · You have signs of a blood clot in your leg (called a deep vein thrombosis), such as:  ? Pain in your calf, back of the knee, thigh, or groin. ? Redness and swelling in your leg or groin.    Watch closely for changes in your health, and be sure to contact your doctor if:    · You have tingling, weakness, or numbness in your knee.     · You have any new symptoms, such as swelling.     · You have bruises from a knee injury that last longer than 2 weeks.     · You do not get better as expected. Where can you learn more? Go to http://hyun-perla.info/. Enter K195 in the search box to learn more about \"Knee Pain or Injury: Care Instructions. \"  Current as of: September 23, 2018  Content Version: 12.1  © 6545-1364 [employer]. Care instructions adapted under license by [employer] (which disclaims liability or warranty for this information). If you have questions about a medical condition or this instruction, always ask your healthcare professional. [place] [address] any warranty or liability for your use of this information.

## 2019-09-20 PROBLEM — Z12.11 SCREEN FOR COLON CANCER: Status: RESOLVED | Noted: 2019-05-17 | Resolved: 2019-09-20

## 2019-10-17 RX ORDER — CLOTRIMAZOLE AND BETAMETHASONE DIPROPIONATE 10; .64 MG/G; MG/G
CREAM TOPICAL 2 TIMES DAILY
Qty: 45 G | Refills: 11 | Status: SHIPPED | OUTPATIENT
Start: 2019-10-17 | End: 2022-09-07 | Stop reason: SDUPTHER

## 2019-11-11 ENCOUNTER — OFFICE VISIT (OUTPATIENT)
Dept: INTERNAL MEDICINE CLINIC | Age: 49
End: 2019-11-11

## 2019-11-11 VITALS
TEMPERATURE: 98.1 F | DIASTOLIC BLOOD PRESSURE: 87 MMHG | RESPIRATION RATE: 16 BRPM | HEART RATE: 77 BPM | HEIGHT: 74 IN | SYSTOLIC BLOOD PRESSURE: 124 MMHG | BODY MASS INDEX: 33.32 KG/M2 | OXYGEN SATURATION: 94 % | WEIGHT: 259.6 LBS

## 2019-11-11 DIAGNOSIS — E78.5 DYSLIPIDEMIA: ICD-10-CM

## 2019-11-11 DIAGNOSIS — E11.9 TYPE 2 DIABETES MELLITUS WITHOUT COMPLICATION, WITHOUT LONG-TERM CURRENT USE OF INSULIN (HCC): Primary | ICD-10-CM

## 2019-11-11 DIAGNOSIS — R94.31 ABNORMAL EKG: ICD-10-CM

## 2019-11-11 DIAGNOSIS — K64.0 GRADE I HEMORRHOIDS: ICD-10-CM

## 2019-11-11 DIAGNOSIS — K57.90 DIVERTICULOSIS: ICD-10-CM

## 2019-11-11 DIAGNOSIS — I51.89 GRADE I DIASTOLIC DYSFUNCTION: ICD-10-CM

## 2019-11-11 PROBLEM — K64.9 HEMORRHOIDS: Status: ACTIVE | Noted: 2019-06-04

## 2019-11-11 NOTE — PROGRESS NOTES
Chief Complaint   Patient presents with    Diabetes     Patient is here for a diabetes follow up. 1. Have you been to the ER, urgent care clinic since your last visit? Hospitalized since your last visit? No    2. Have you seen or consulted any other health care providers outside of the [address] [place] [address] since your last visit? Include any pap smears or colon screening.  No

## 2019-11-11 NOTE — PROGRESS NOTES
SPORTS MEDICINE AND PRIMARY CARE  Ana Álvarez MD, [address] [address]  Phone:  [phone]  Fax: [phone]       Chief Complaint   Patient presents with    Diabetes     Patient is here for a diabetes follow up. .      SUBJECTIVE:    Leona Ibrahim is a 52 y.o. male Patient returns today with known history of type 2 diabetes whose control is excellent with hemoglobin A1c of 6.6 the last time it was checked, obesity and dyslipidemia, and is seen for evaluation. Since we last saw him he had his colonoscopy on 06/28/19 that was remarkable for diverticulosis. He was seen by Danitza Moss PA-C on 08/30/19 with right knee pain and had doppler exam that revealed evidence of lower extremity thrombosis and had an x-ray of the knee that revealed no abnormality. Patient is seen for evaluation. Patient has had an injury to his sacrum, the chair sit and he sat down on the sacrum. It is a little better than it was four days ago when it first happened. Patient is seen for evaluation. Current Outpatient Medications   Medication Sig Dispense Refill    clotrimazole-betamethasone (LOTRISONE) topical cream Apply  to affected area two (2) times a day. 45 g 11    naproxen (NAPROSYN) 500 mg tablet Take 1 Tab by mouth two (2) times daily (with meals). 20 Tab 0    Blood-Glucose Meter (ONETOUCH VERIO FLEX) misc Used to test blood sugars 3 times daily 1 Each 0    glucose blood VI test strips (ONETOUCH VERIO) strip Used to test blood sugar 3 times daily. 100 Strip 11    atorvastatin (LIPITOR) 10 mg tablet Take 1 Tab by mouth daily. 30 Tab 11    metFORMIN ER (GLUCOPHAGE XR) 500 mg tablet Take 1 Tab by mouth daily (with dinner).  27 Tab 11     Past Medical History:   Diagnosis Date    Abnormal EKG 05/17/2019    Diverticulosis 06/04/2019    [facility] 05/17/[facility]    Dyslipidemia     Grade I diastolic dysfunction 03/43/3221    echo    Hemorrhoids 06/04/2019    Obesity (BMI 30.0-34. 9)     Obesity (BMI 30.0-34. 9)     Preventative health care     S/P colonoscopy 06/28/2019    md viry -10 yrs -hemorrhoids,tics     Past Surgical History:   Procedure Laterality Date    COLONOSCOPY N/A 6/28/2019    COLONOSCOPY performed by Cayden Dailey MD at [facility] ENDOSCOPY     No Known Allergies      REVIEW OF SYSTEMS:  General: negative for - chills or fever  ENT: negative for - headaches, nasal congestion or tinnitus  Respiratory: negative for - cough, hemoptysis, shortness of breath or wheezing  Cardiovascular : negative for - chest pain, edema, palpitations or shortness of breath  Gastrointestinal: negative for - abdominal pain, blood in stools, heartburn or nausea/vomiting  Genito-Urinary: no dysuria, trouble voiding, or hematuria  Musculoskeletal: negative for - gait disturbance, joint pain, joint stiffness or joint swelling  Neurological: no TIA or stroke symptoms  Hematologic: no bruises, no bleeding, no swollen glands  Integument: no lumps, mole changes, nail changes or rash  Endocrine: no malaise/lethargy or unexpected weight changes      Social History     Socioeconomic History    Marital status:      Spouse name: Not on file    Number of children: Not on file    Years of education: Not on file    Highest education level: Not on file   Tobacco Use    Smoking status: Never Smoker    Smokeless tobacco: Never Used   Substance and Sexual Activity    Alcohol use: Never     Frequency: Never    Drug use: Never    Sexual activity: Yes     Partners: Female     Birth control/protection: None   Social History Narrative    Habits:  He is a lifetime nonsmoker, non drinker, non drug abuser.         Social History:  The patient is , lives with his wife. He was born in [place]. He has been in the [address] [place] [address] since 2005. They have three children, two daughters ages 9 and 11, and a son age 3. He completed his ALIA at [employer].   He was in [employer] for five years. 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 Orthodoxy background is Caodaism.         Family History:  Father 76, alive and well. Mother 67 with diabetes. Six brothers and four sisters are alive and well. Family History   Problem Relation Age of Onset    Diabetes Mother        OBJECTIVE:    Visit Vitals  /87   Pulse 77   Temp 98.1 °F (36.7 °C)   Resp 16   Ht 6' 2\" (1.88 m)   Wt 259 lb 9.6 oz (117.8 kg)   SpO2 94%   BMI 33.33 kg/m²     CONSTITUTIONAL: well , well nourished, appears age appropriate  EYES: perrla, eom intact  ENMT:moist mucous membranes, pharynx clear  NECK: supple. Thyroid normal  RESPIRATORY: Chest: clear bilaterally   CARDIOVASCULAR: Heart: regular rate and rhythm  GASTROINTESTINAL: Abdomen: soft, bowel sounds active  HEMATOLOGIC: no pathological lymph nodes palpated  MUSCULOSKELETAL: Extremities: no edema, pulse 1+   INTEGUMENT: No unusual rashes or suspicious skin lesions noted. Nails appear normal.  NEUROLOGIC: non-focal exam   MENTAL STATUS: alert and oriented, appropriate affect           ASSESSMENT:  1. Type 2 diabetes mellitus without complication, without long-term current use of insulin ([facility]    2. Abnormal EKG    3. Dyslipidemia    4. Grade I diastolic dysfunction    5. Grade I hemorrhoids    6. Diverticulosis      Blood sugar control has been excellent with hemoglobin A1c of around 6.5. We will check hemoglobin A1c today to confirm. He had an abnormal EKG, but we did an echocardiogram, which revealed grade 1 diastolic dysfunction and otherwise was unremarkable. He has dyslipidemia, for which we will check his cholesterol and if not at goal will increase Lipitor and send him a note to that effect. He has grade 1 diastolic dysfunction with no evidence of cardiac decompensation. His colon exam revealed hemorrhoids and diverticulosis. I think he has a coccygeal strain with pain and suggest a pillow and Naprosyn.     He will be back to see us in about four months, sooner if he has any problems. He is working out on a regular basis and goes to [employer], but states he is hungry all the time. He thought it was related to medication, but we advised him that the medication does not increase his appetite. Hopefully he will make a change with his appetite. He will be back to see us in about four months. I have discussed the diagnosis with the patient and the intended plan as seen in the  orders above. The patient understands and agees with the plan. The patient has   received an after visit summary and questions were answered concerning  future plans  Patient labs and/or xrays were reviewed  Past records were reviewed. PLAN:  .  Orders Placed This Encounter    LIPID PANEL    HEMOGLOBIN A1C WITH EAG       Follow-up and Dispositions    · Return in about 4 months (around 3/11/2020). ATTENTION:   This medical record was transcribed using an electronic medical records system. Although proofread, it may and can contain electronic and spelling errors. Other human spelling and other errors may be present. Corrections may be executed at a later time. Please feel free to contact us for any clarifications as needed.

## 2019-11-13 LAB
CHOLEST SERPL-MCNC: 143 MG/DL (ref 100–199)
EST. AVERAGE GLUCOSE BLD GHB EST-MCNC: 140 MG/DL
HBA1C MFR BLD: 6.5 % (ref 4.8–5.6)
HDLC SERPL-MCNC: 56 MG/DL
LDLC SERPL CALC-MCNC: 56 MG/DL (ref 0–99)
TRIGL SERPL-MCNC: 153 MG/DL (ref 0–149)
VLDLC SERPL CALC-MCNC: 31 MG/DL (ref 5–40)

## 2020-01-06 RX ORDER — NAPROXEN 500 MG/1
500 TABLET ORAL 2 TIMES DAILY WITH MEALS
Qty: 20 TAB | Refills: 0 | OUTPATIENT
Start: 2020-01-06 | End: 2020-01-13

## 2020-01-13 ENCOUNTER — HOSPITAL ENCOUNTER (EMERGENCY)
Age: 50
Discharge: HOME | End: 2020-01-13
Attending: STUDENT IN AN ORGANIZED HEALTH CARE EDUCATION/TRAINING PROGRAM
Payer: MEDICAID

## 2020-01-13 VITALS
RESPIRATION RATE: 20 BRPM | TEMPERATURE: 98.2 F | HEART RATE: 95 BPM | DIASTOLIC BLOOD PRESSURE: 97 MMHG | HEIGHT: 74 IN | OXYGEN SATURATION: 97 % | WEIGHT: 240 LBS | BODY MASS INDEX: 30.8 KG/M2 | SYSTOLIC BLOOD PRESSURE: 154 MMHG

## 2020-01-13 DIAGNOSIS — H66.91 RIGHT OTITIS MEDIA, UNSPECIFIED OTITIS MEDIA TYPE: Primary | ICD-10-CM

## 2020-01-13 PROCEDURE — 99282 EMERGENCY DEPT VISIT SF MDM: CPT

## 2020-01-13 RX ORDER — AMOXICILLIN 875 MG/1
875 TABLET, FILM COATED ORAL 2 TIMES DAILY
Qty: 14 TAB | Refills: 0 | Status: SHIPPED | OUTPATIENT
Start: 2020-01-13 | End: 2020-01-20

## 2020-01-13 RX ORDER — NAPROXEN 500 MG/1
500 TABLET ORAL
Qty: 20 TAB | Refills: 0 | Status: SHIPPED | OUTPATIENT
Start: 2020-01-13 | End: 2020-01-23

## 2020-01-13 NOTE — ED TRIAGE NOTES
Triage Note: Patient is coming in with right ear pain x 2 days. Patient had dental procedure 2 weeks ago on the same side.

## 2020-01-13 NOTE — DISCHARGE INSTRUCTIONS
Patient Education     Return for new or worsening symptoms. Use an OTC nasal spray containing a steroid (nasonex, flonase, etc.). Ear Infection (Otitis Media): Care Instructions  Your Care Instructions    An ear infection may start with a cold and affect the middle ear (otitis media). It can hurt a lot. Most ear infections clear up on their own in a couple of days. Most often you will not need antibiotics. This is because many ear infections are caused by a virus. Antibiotics don't work against a virus. Regular doses of pain medicines are the best way to reduce your fever and help you feel better. Follow-up care is a key part of your treatment and safety. Be sure to make and go to all appointments, and call your doctor if you are having problems. It's also a good idea to know your test results and keep a list of the medicines you take. How can you care for yourself at home? · Take pain medicines exactly as directed. ? If the doctor gave you a prescription medicine for pain, take it as prescribed. ? If you are not taking a prescription pain medicine, take an over-the-counter medicine, such as acetaminophen (Tylenol), ibuprofen (Advil, Motrin), or naproxen (Aleve). Read and follow all instructions on the label. ? Do not take two or more pain medicines at the same time unless the doctor told you to. Many pain medicines have acetaminophen, which is Tylenol. Too much acetaminophen (Tylenol) can be harmful. · Plan to take a full dose of pain reliever before bedtime. Getting enough sleep will help you get better. · Try a warm, moist washcloth on the ear. It may help relieve pain. · If your doctor prescribed antibiotics, take them as directed. Do not stop taking them just because you feel better. You need to take the full course of antibiotics. When should you call for help?   Call your doctor now or seek immediate medical care if:    · You have new or increasing ear pain.     · You have new or increasing pus or blood draining from your ear.     · You have a fever with a stiff neck or a severe headache.    Watch closely for changes in your health, and be sure to contact your doctor if:    · You have new or worse symptoms.     · You are not getting better after taking an antibiotic for 2 days. Where can you learn more? Go to http://hyun-perla.info/. Enter I807 in the search box to learn more about \"Ear Infection (Otitis Media): Care Instructions. \"  Current as of: October 21, 2018  Content Version: 12.2  © 7069-3609 [employer]. Care instructions adapted under license by [employer] (which disclaims liability or warranty for this information). If you have questions about a medical condition or this instruction, always ask your healthcare professional. [address] any warranty or liability for your use of this information.

## 2020-01-13 NOTE — ED PROVIDER NOTES
52year old male presenting to the ED for RIGHT ear pain. Notes pain x 2 days. Worse at night. No drainage from the ear. No recent fever or URI. Throbbing, moderately severe. Has tried Tylenol with some relief. Had a cavity filled on the same side 2 weeks ago, denies dental pain. No dysphagia. Social: denies tobacco, alcohol, drug use           Past Medical History:   Diagnosis Date    Abnormal EKG 05/17/2019    Diverticulosis 06/04/2019    DM2 (diabetes mellitus, type 2) ([facility] 05/17/2019    Dyslipidemia     Grade I diastolic dysfunction 74/75/4513    echo    Hemorrhoids 06/04/2019    Obesity (BMI 30.0-34. 9)     Obesity (BMI 30.0-34. 9)     Preventative health care     S/P colonoscopy 06/28/2019    md viry -10 yrs -hemorrhoids,tics       Past Surgical History:   Procedure Laterality Date    COLONOSCOPY N/A 6/28/2019    COL[facility] by Sam Pablo [facility] ENDOSCOPY         Family History:   Problem Relation Age of Onset    Diabetes Mother        Social History     Socioeconomic History    Marital status:      Spouse name: Not on file    Number of children: Not on file    Years of education: Not on file    Highest education level: Not on file   Occupational History    Not on file   Social Needs    Financial resource strain: Not on file    Food insecurity:     Worry: Not on file     Inability: Not on file    Transportation needs:     Medical: Not on file     Non-medical: Not on file   Tobacco Use    Smoking status: Never Smoker    Smokeless tobacco: Never Used   Substance and Sexual Activity    Alcohol use: Never     Frequency: Never    Drug use: Never    Sexual activity: Yes     Partners: Female     Birth control/protection: None   Lifestyle    Physical activity:     Days per week: Not on file     Minutes per session: Not on file    Stress: Not on file   Relationships    Social connections:     Talks on phone: Not on file     Gets together: Not on file Attends Cheondoism service: Not on file     Active member of club or organization: Not on file     Attends meetings of clubs or organizations: Not on file     Relationship status: Not on file    Intimate partner violence:     Fear of current or ex partner: Not on file     Emotionally abused: Not on file     Physically abused: Not on file     Forced sexual activity: Not on file   Other Topics Concern    Not on file   Social History Narrative    Habits:  He is a lifetime nonsmoker, non drinker, non drug abuser.         Social History:  The patient is , lives with his wife. He was born in [place]. He has been in the [address] [place] [address],[address] since 2005. They have three children, two daughters ages 9 and 11, and a son age 3. He completed his ALIA at [employer]. He was in [facility] for five years. 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 Yazidism background is Caodaism.         Family History:  Father 76, alive and well. Mother 67 with diabetes. Six brothers and four sisters are alive and well. ALLERGIES: Patient has no known allergies. Review of Systems   Constitutional: Negative for fever. HENT: Positive for ear pain. Negative for ear discharge and facial swelling. Respiratory: Negative for shortness of breath. Cardiovascular: Negative for chest pain. Gastrointestinal: Negative for vomiting. Skin: Negative for wound. Neurological: Negative for syncope. All other systems reviewed and are negative. Vitals:    01/13/20 1340   BP: (!) 154/97   Pulse: 95   Resp: 20   Temp: 98.2 °F (36.8 °C)   SpO2: 97%   Weight: 108.9 kg (240 lb)   Height: 6' 2\" (1.88 m)            Physical Exam  Vitals signs and nursing note reviewed. Constitutional:       Appearance: He is well-developed. Comments: Pleasant AA male   HENT:      Head: Normocephalic. Comments: Cloudy effusion noted behind the right tympanic membrane. No tenderness over the mastoid.   No facial swelling or cellulitis. Normal neck. Midline uvula. No trismus, stridor, drooling. Eyes:      Conjunctiva/sclera: Conjunctivae normal.   Neck:      Musculoskeletal: Neck supple. Cardiovascular:      Rate and Rhythm: Normal rate. Pulmonary:      Effort: Pulmonary effort is normal. No respiratory distress. Musculoskeletal: Normal range of motion. Skin:     General: Skin is warm and dry. Neurological:      Mental Status: He is alert and oriented to person, place, and time. MDM  Number of Diagnoses or Management Options  Right otitis media, unspecified otitis media type:   Diagnosis management comments: 41-year-old male presenting to the ER for 2 days of right ear pain. No external swelling, fever, tenderness concerning for mastoiditis, of deep space neck or oropharyngeal infection on exam.  Cloudy effusion noted. Will treat.        Amount and/or Complexity of Data Reviewed  Discuss the patient with other providers: yes (Dr. Alejandro Zamudio ED attending)           Procedures

## 2020-01-13 NOTE — ED NOTES
Alison Abdalla [place] gave and reviewed discharge instructions with patient. Patient verbalizes understanding of discharge instructions. Pt alert and oriented, appears in no acute distress, respirations equal and unlabored. Ambulatory upon discharge with steady gait.

## 2020-02-28 ENCOUNTER — HOSPITAL ENCOUNTER (EMERGENCY)
Age: 50
Discharge: HOME | End: 2020-02-28
Attending: EMERGENCY MEDICINE
Payer: MEDICAID

## 2020-02-28 ENCOUNTER — APPOINTMENT (OUTPATIENT)
Dept: GENERAL RADIOLOGY | Age: 50
End: 2020-02-28
Attending: EMERGENCY MEDICINE
Payer: MEDICAID

## 2020-02-28 VITALS
OXYGEN SATURATION: 97 % | TEMPERATURE: 99.1 F | HEART RATE: 89 BPM | SYSTOLIC BLOOD PRESSURE: 132 MMHG | RESPIRATION RATE: 18 BRPM | DIASTOLIC BLOOD PRESSURE: 89 MMHG

## 2020-02-28 DIAGNOSIS — J18.9 PNEUMONIA OF LEFT LOWER LOBE DUE TO INFECTIOUS ORGANISM: Primary | ICD-10-CM

## 2020-02-28 LAB
COMMENT, HOLDF: NORMAL
COMMENT, HOLDF: NORMAL
FLUAV AG NPH QL IA: NEGATIVE
FLUBV AG NOSE QL IA: NEGATIVE
SAMPLES BEING HELD,HOLD: NORMAL
SAMPLES BEING HELD,HOLD: NORMAL

## 2020-02-28 PROCEDURE — 99283 EMERGENCY DEPT VISIT LOW MDM: CPT

## 2020-02-28 PROCEDURE — 96361 HYDRATE IV INFUSION ADD-ON: CPT

## 2020-02-28 PROCEDURE — 74011250637 HC RX REV CODE- 250/637: Performed by: EMERGENCY MEDICINE

## 2020-02-28 PROCEDURE — 71046 X-RAY EXAM CHEST 2 VIEWS: CPT

## 2020-02-28 PROCEDURE — 87804 INFLUENZA ASSAY W/OPTIC: CPT

## 2020-02-28 PROCEDURE — 74011250636 HC RX REV CODE- 250/636: Performed by: EMERGENCY MEDICINE

## 2020-02-28 PROCEDURE — 96374 THER/PROPH/DIAG INJ IV PUSH: CPT

## 2020-02-28 RX ORDER — ACETAMINOPHEN 325 MG/1
975 TABLET ORAL
Status: COMPLETED | OUTPATIENT
Start: 2020-02-28 | End: 2020-02-28

## 2020-02-28 RX ORDER — AZITHROMYCIN 250 MG/1
500 TABLET, FILM COATED ORAL
Status: COMPLETED | OUTPATIENT
Start: 2020-02-28 | End: 2020-02-28

## 2020-02-28 RX ORDER — AZITHROMYCIN 250 MG/1
250 TABLET, FILM COATED ORAL DAILY
Qty: 4 TAB | Refills: 0 | Status: SHIPPED | OUTPATIENT
Start: 2020-02-28 | End: 2020-02-28

## 2020-02-28 RX ORDER — AZITHROMYCIN 250 MG/1
250 TABLET, FILM COATED ORAL DAILY
Qty: 4 TAB | Refills: 0 | Status: SHIPPED | OUTPATIENT
Start: 2020-02-28 | End: 2020-03-03

## 2020-02-28 RX ORDER — ONDANSETRON 8 MG/1
8 TABLET, ORALLY DISINTEGRATING ORAL
Qty: 8 TAB | Refills: 0 | Status: SHIPPED | OUTPATIENT
Start: 2020-02-28 | End: 2020-03-16 | Stop reason: ALTCHOICE

## 2020-02-28 RX ORDER — ONDANSETRON 2 MG/ML
8 INJECTION INTRAMUSCULAR; INTRAVENOUS
Status: COMPLETED | OUTPATIENT
Start: 2020-02-28 | End: 2020-02-28

## 2020-02-28 RX ADMIN — AZITHROMYCIN MONOHYDRATE 500 MG: 250 TABLET ORAL at 06:57

## 2020-02-28 RX ADMIN — ONDANSETRON 8 MG: 2 INJECTION, SOLUTION INTRAMUSCULAR; INTRAVENOUS at 06:25

## 2020-02-28 RX ADMIN — ACETAMINOPHEN 975 MG: 325 TABLET ORAL at 06:24

## 2020-02-28 RX ADMIN — SODIUM CHLORIDE 1000 ML: 900 INJECTION, SOLUTION INTRAVENOUS at 06:24

## 2020-02-28 NOTE — ED PROVIDER NOTES
HPI     44-year-old male with a history of diverticulosis, diabetes, high cholesterol, congestive heart failure, presents the emergency department complaining of cough congestion as well as nausea vomiting. He has not had anything to eat or drink in 24 hours. He is still making urine. He feels weak all over. He has had a fever. His daughter had similar illness last week but is over it. He did not get the flu shot. He denies any chest pain or shortness of breath. He last took Motrin yesterday. He states this morning he coughed so much he felt dizzy. He feels dehydrated and weak. Past Medical History:   Diagnosis Date    Abnormal EKG 05/17/2019    Diverticulosis 06/04/2019    DM2 (diabetes mellitus, type 2) ([facility] 05/17/2019    Dyslipidemia     Grade I diastolic dysfunction 75/67/5424    echo    Hemorrhoids 06/04/2019    Obesity (BMI 30.0-34. 9)     Obesity (BMI 30.0-34. 9)     Preventative health care     S/P colonoscopy 06/28/2019    md viry -10 yrs -hemorrhoids,tics       Past Surgical History:   Procedure Laterality Date    COLONOSCOPY N/A 6/28/2019    COLONOSCOPY performed by Shanna Bruno MD at [facility]"" ENDOSCOPY         Family History:   Problem Relation Age of Onset    Diabetes Mother        Social History     Socioeconomic History    Marital status:      Spouse name: Not on file    Number of children: Not on file    Years of education: Not on file    Highest education level: Not on file   Occupational History    Not on file   Social Needs    Financial resource strain: Not on file    Food insecurity:     Worry: Not on file     Inability: Not on file    Transportation needs:     Medical: Not on file     Non-medical: Not on file   Tobacco Use    Smoking status: Never Smoker    Smokeless tobacco: Never Used   Substance and Sexual Activity    Alcohol use: Never     Frequency: Never    Drug use: Never    Sexual activity: Yes     Partners: Female     Birth control/protection: None   Lifestyle    Physical activity:     Days per week: Not on file     Minutes per session: Not on file    Stress: Not on file   Relationships    Social connections:     Talks on phone: Not on file     Gets together: Not on file     Attends Hinduism service: Not on file     Active member of club or organization: Not on file     Attends meetings of clubs or organizations: Not on file     Relationship status: Not on file    Intimate partner violence:     Fear of current or ex partner: Not on file     Emotionally abused: Not on file     Physically abused: Not on file     Forced sexual activity: Not on file   Other Topics Concern    Not on file   Social History Narrative    Habits:  He is a lifetime nonsmoker, non drinker, non drug abuser.         Social History:  The patient is , lives with his wife. He was born in [place]. He has been in the [address] [place] [address],[address] since 2005. They have three children, two daughters ages 9 and 11, and a son age 3. He completed his ALIA at [employer]. He was in [employer] for five years. 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 Taoism background is Adventist.         Family History:  Father 76, alive and well. Mother 67 with diabetes. Six brothers and four sisters are alive and well. ALLERGIES: Patient has no known allergies. Review of Systems   Constitutional: Positive for appetite change, chills and fever. HENT: Positive for congestion. Eyes: Negative for visual disturbance. Respiratory: Positive for cough. Negative for shortness of breath. Cardiovascular: Negative for chest pain, palpitations and leg swelling. Gastrointestinal: Positive for diarrhea, nausea and vomiting. Negative for abdominal pain. Genitourinary: Negative for dysuria. Musculoskeletal: Negative for gait problem. Skin: Negative for rash. Neurological: Positive for dizziness. Negative for headaches.    Psychiatric/Behavioral: Negative for dysphoric mood. Vitals:    02/28/20 0502   BP: 131/88   Pulse: (!) 105   Resp: 18   Temp: 100.2 °F (37.9 °C)   SpO2: 96%            Physical Exam  Constitutional:       General: He is not in acute distress. Appearance: He is well-developed. HENT:      Head: Normocephalic and atraumatic. Mouth/Throat:      Pharynx: No oropharyngeal exudate. Eyes:      General: No scleral icterus. Right eye: No discharge. Left eye: No discharge. Pupils: Pupils are equal, round, and reactive to light. Neck:      Musculoskeletal: Normal range of motion and neck supple. Vascular: No JVD. Cardiovascular:      Rate and Rhythm: Regular rhythm. Tachycardia present. Heart sounds: Normal heart sounds. No murmur. Pulmonary:      Effort: Pulmonary effort is normal. No respiratory distress. Breath sounds: Normal breath sounds. No stridor. No wheezing or rales. Chest:      Chest wall: No tenderness. Abdominal:      General: Bowel sounds are normal. There is no distension. Palpations: Abdomen is soft. There is no mass. Tenderness: There is no abdominal tenderness. There is no guarding or rebound. Musculoskeletal: Normal range of motion. Skin:     General: Skin is warm and dry. Capillary Refill: Capillary refill takes less than 2 seconds. Findings: No rash. Neurological:      Mental Status: He is oriented to person, place, and time. Psychiatric:         Behavior: Behavior normal.         Thought Content: Thought content normal.         Judgment: Judgment normal.          MDM       Procedures    IV fluids, zofran, cxr. CXR with possible pneumonia. Will d/c with zpack. Zofran. Follow up with pcp next week, return to ED if worsening over weekend.

## 2020-02-28 NOTE — DISCHARGE INSTRUCTIONS
Patient Education      Your xray is concerning for a small pneumonia on the left. I have prescribed antibiotics to fight the infection. I have also prescribed Zofran to help with nausea. You should be feeling better in 1-2 days on antibiotics. Please call and arrange close follow up with Dr. Wilmer Powers for Monday. If over the weekend you feel your symptoms/condition is getting worse- continued vomiting, chest pain, shortness of breath, etc. Return here. Pneumonia: Care Instructions  Your Care Instructions    Pneumonia is an infection of the lungs. Most cases are caused by infections from bacteria or viruses. Pneumonia may be mild or very severe. If it is caused by bacteria, you will be treated with antibiotics. It may take a few weeks to a few months to recover fully from pneumonia, depending on how sick you were and whether your overall health is good. Follow-up care is a key part of your treatment and safety. Be sure to make and go to all appointments, and call your doctor if you are having problems. It's also a good idea to know your test results and keep a list of the medicines you take. How can you care for yourself at home? · Take your antibiotics exactly as directed. Do not stop taking the medicine just because you are feeling better. You need to take the full course of antibiotics. · Take your medicines exactly as prescribed. Call your doctor if you think you are having a problem with your medicine. · Get plenty of rest and sleep. You may feel weak and tired for a while, but your energy level will improve with time. · To prevent dehydration, drink plenty of fluids, enough so that your urine is light yellow or clear like water. Choose water and other caffeine-free clear liquids until you feel better. If you have kidney, heart, or liver disease and have to limit fluids, talk with your doctor before you increase the amount of fluids you drink.   · Take care of your cough so you can rest. A cough that brings up mucus from your lungs is common with pneumonia. It is one way your body gets rid of the infection. But if coughing keeps you from resting or causes severe fatigue and chest-wall pain, talk to your doctor. He or she may suggest that you take a medicine to reduce the cough. · Use a vaporizer or humidifier to add moisture to your bedroom. Follow the directions for cleaning the machine. · Do not smoke or allow others to smoke around you. Smoke will make your cough last longer. If you need help quitting, talk to your doctor about stop-smoking programs and medicines. These can increase your chances of quitting for good. · Take an over-the-counter pain medicine, such as acetaminophen (Tylenol), ibuprofen (Advil, Motrin), or naproxen (Aleve). Read and follow all instructions on the label. · Do not take two or more pain medicines at the same time unless the doctor told you to. Many pain medicines have acetaminophen, which is Tylenol. Too much acetaminophen (Tylenol) can be harmful. · If you were given a spirometer to measure how well your lungs are working, use it as instructed. This can help your doctor tell how your recovery is going. · To prevent pneumonia in the future, talk to your doctor about getting a flu vaccine (once a year) and a pneumococcal vaccine (one time only for most people). When should you call for help? Call 911 anytime you think you may need emergency care.  For example, call if:    · You have severe trouble breathing.    Call your doctor now or seek immediate medical care if:    · You cough up dark brown or bloody mucus (sputum).     · You have new or worse trouble breathing.     · You are dizzy or lightheaded, or you feel like you may faint.    Watch closely for changes in your health, and be sure to contact your doctor if:    · You have a new or higher fever.     · You are coughing more deeply or more often.     · You are not getting better after 2 days (48 hours).     · You do not get better as expected. Where can you learn more? Go to http://hyun-perla.info/. Enter [id number] in the search box to learn more about \"Pneumonia: Care Instructions. \"  Current as of: June 9, 2019  Content Version: 12.2  © 5675-4616 [employer]. Care instructions adapted under license by [employer] (which disclaims liability or warranty for this information). If you have questions about a medical condition or this instruction, always ask your healthcare professional. [address] any warranty or liability for your use of this information.

## 2020-02-28 NOTE — ED TRIAGE NOTES
Patient arrives ambulatory from home with CC of chills, body aches, and  Nonproductive cough. Pt also reports diarrhea and vomiting. Pt's daughter is sick with similar symptoms. Denies chest pain and shortness of breath.

## 2020-03-16 ENCOUNTER — OFFICE VISIT (OUTPATIENT)
Dept: INTERNAL MEDICINE CLINIC | Age: 50
End: 2020-03-16

## 2020-03-16 VITALS
RESPIRATION RATE: 18 BRPM | SYSTOLIC BLOOD PRESSURE: 136 MMHG | HEART RATE: 73 BPM | WEIGHT: 256.9 LBS | HEIGHT: 74 IN | DIASTOLIC BLOOD PRESSURE: 88 MMHG | BODY MASS INDEX: 32.97 KG/M2 | OXYGEN SATURATION: 95 % | TEMPERATURE: 98 F

## 2020-03-16 DIAGNOSIS — J11.00 INFLUENZA AND PNEUMONIA: ICD-10-CM

## 2020-03-16 DIAGNOSIS — K57.90 DIVERTICULOSIS: ICD-10-CM

## 2020-03-16 DIAGNOSIS — E11.9 TYPE 2 DIABETES MELLITUS WITHOUT COMPLICATION, WITHOUT LONG-TERM CURRENT USE OF INSULIN (HCC): ICD-10-CM

## 2020-03-16 DIAGNOSIS — E78.5 DYSLIPIDEMIA: ICD-10-CM

## 2020-03-16 DIAGNOSIS — E66.9 OBESITY (BMI 30.0-34.9): ICD-10-CM

## 2020-03-16 DIAGNOSIS — I51.89 GRADE I DIASTOLIC DYSFUNCTION: Primary | ICD-10-CM

## 2020-03-16 NOTE — PROGRESS NOTES
SPORTS MEDICINE AND PRIMARY CARE  Maryam Romero MD, [address] [address]  Phone:  [phone]  Fax: [phone]       Chief Complaint   Patient presents with    Diabetes   . SUBJECTIVE:    Liz Roy is a 48 y.o. male Was seen in the emergency room by Montserrat Harrell MD with congestion, cough, nausea, vomiting for the previous 24 hours. He was evaluated and chest x-ray revealed pneumonia and was discharged with a Z-Godwin and was advised to come in for follow up. He has no new complaints and chest x-ray. Patient has a known history of grade 1 diastolic dysfunction, diverticulosis, dyslipidemia, diabetes, obesity, and is seen for evaluation. Patient returns today saying he feels better, he mainly has a cough once a day now, but he feels back to normal and is seen for evaluation, taking medications on a regular basis. Current Outpatient Medications   Medication Sig Dispense Refill    clotrimazole-betamethasone (LOTRISONE) topical cream Apply  to affected area two (2) times a day. 45 g 11    Blood-Glucose Meter (ONETOUCH VERIO FLEX) misc Used to test blood sugars 3 times daily 1 Each 0    glucose blood VI test strips (ONETOUCH VERIO) strip Used to test blood sugar 3 times daily. 100 Strip 11    atorvastatin (LIPITOR) 10 mg tablet Take 1 Tab by mouth daily. 30 Tab 11    metFORMIN ER (GLUCOPHAGE XR) 500 mg tablet Take 1 Tab by mouth daily (with dinner). 27 Tab 11     Past Medical History:   Diagnosis Date    Abnormal EKG 05/17/2019    Diverticulosis 06/04/2019    DM2 (diabetes mellitus, type 2) ([address] 05/17/2019    Dyslipidemia     Grade I diastolic dysfunction 74/28/2821    echo    Hemorrhoids 06/04/2019    Obesity (BMI 30.0-34. 9)     Obesity (BMI 30.0-34. 9)     Preventative health care     S/P colonoscopy 06/28/2019    md viry -10 yrs -hemorrhoids,tics     Past Surgical History:   Procedure Laterality Date    COLONOSCOPY N/A 6/28/2019 COLONOSCOPY performed by Iggy Maya MD at hospitals ENDOSCOPY     No Known Allergies      REVIEW OF SYSTEMS:  General: negative for - chills or fever  ENT: negative for - headaches, nasal congestion or tinnitus  Respiratory: negative for - cough, hemoptysis, shortness of breath or wheezing  Cardiovascular : negative for - chest pain, edema, palpitations or shortness of breath  Gastrointestinal: negative for - abdominal pain, blood in stools, heartburn or nausea/vomiting  Genito-Urinary: no dysuria, trouble voiding, or hematuria  Musculoskeletal: negative for - gait disturbance, joint pain, joint stiffness or joint swelling  Neurological: no TIA or stroke symptoms  Hematologic: no bruises, no bleeding, no swollen glands  Integument: no lumps, mole changes, nail changes or rash  Endocrine: no malaise/lethargy or unexpected weight changes      Social History     Socioeconomic History    Marital status:      Spouse name: Not on file    Number of children: Not on file    Years of education: Not on file    Highest education level: Not on file   Tobacco Use    Smoking status: Never Smoker    Smokeless tobacco: Never Used   Substance and Sexual Activity    Alcohol use: Never     Frequency: Never    Drug use: Never    Sexual activity: Yes     Partners: Female     Birth control/protection: None   Social History Narrative    Habits:  He is a lifetime nonsmoker, non drinker, non drug abuser.         Social History:  The patient is , lives with his wife. He was born in [place]. He has been in the [address] [place] [address] since 2005. They have three children, two daughters ages 9 and 11, and a son age 3. He completed his ALIA at [employer]. He was in [facility] for five years. 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 Ca[employer] background is Rastafari.         Family History:  Father 76, alive and well. Mother 67 with diabetes. Six brothers and four sisters are alive and well.      Family History Problem Relation Age of Onset    Diabetes Mother        OBJECTIVE:    Visit Vitals  /72   Pulse 73   Temp 98 °F (36.7 °C) (Oral)   Resp 18   Ht 6' 2\" (1.88 m)   Wt 256 lb 14.4 oz (116.5 kg)   SpO2 95%   BMI 32.98 kg/m²     CONSTITUTIONAL: well , well nourished, appears age appropriate  EYES: perrla, eom intact  ENMT:moist mucous membranes, pharynx clear  NECK: supple. Thyroid normal  RESPIRATORY: Chest: clear bilaterally   CARDIOVASCULAR: Heart: regular rate and rhythm  GASTROINTESTINAL: Abdomen: soft, bowel sounds active  HEMATOLOGIC: no pathological lymph nodes palpated  MUSCULOSKELETAL: Extremities: no edema, pulse 1+ Foot exam reveals no lesions. Sensation is intact to fine filament. Pulses are intact. INTEGUMENT: No unusual rashes or suspicious skin lesions noted. Nails appear normal.  NEUROLOGIC: non-focal exam   MENTAL STATUS: alert and oriented, appropriate affect           ASSESSMENT:  1. Grade I diastolic dysfunction    2. Diverticulosis    3. Dyslipidemia    4. Type 2 diabetes mellitus without complication, without long-term current use of insulin ([facility])    5. Obesity (BMI 30.0-34.9)    6. Influenza and pneumonia      Known history of grade 1 diastolic dysfunction that is currently asymptomatic. Diverticulosis is asymptomatic. He is on Atorvastatin for his dyslipidemia, is taking it on a regular basis. Will assess blood sugar control with hemoglobin A1c. We will report to him the results. His BMI continues to reflect obesity and we encourage a heart healthy, diabetic, weight reducing diet. Will repeat his chest x-ray in about a month to confirm resolution of pneumonia. If pneumonia has not resolved, then he will need a CT scan of his chest.  He agrees with the plan. Discussed the patient's BMI with him.   The BMI follow up plan is as follows:     dietary management education, guidance, and counseling  encourage exercise  monitor weight  prescribed dietary intake    An After Visit Summary was printed and given to the patient. I have discussed the diagnosis with the patient and the intended plan as seen in the  orders above. The patient understands and agees with the plan. The patient has   received an after visit summary and questions were answered concerning  future plans  Patient labs and/or xrays were reviewed  Past records were reviewed. PLAN:  .  Orders Placed This Encounter    XR CHEST PA LAT    MICROALBUMIN, UR, RAND W/ MICROALB/CREAT RATIO    HEMOGLOBIN A1C WITH EAG       Follow-up and Dispositions    · Return in about 4 months (around 7/16/2020). ATTENTION:   This medical record was transcribed using an electronic medical records system. Although proofread, it may and can contain electronic and spelling errors. Other human spelling and other errors may be present. Corrections may be executed at a later time. Please feel free to contact us for any clarifications as needed.

## 2020-03-16 NOTE — PROGRESS NOTES
1. Have you been to the ER, urgent care clinic since your last visit? Hospitalized since your last visit? Yes When: 2- Where: [facility] Reason for visit: cold symptoms    2. Have you seen or consulted any other health care providers outside of the [address] [place] [address] since your last visit? Include any pap smears or colon screening.  No

## 2020-03-16 NOTE — PATIENT INSTRUCTIONS
Body Mass Index: Care Instructions Your Care Instructions Body mass index (BMI) can help you see if your weight is raising your risk for health problems. It uses a formula to compare how much you weigh with how tall you are. · A BMI lower than 18.5 is considered underweight. · A BMI between 18.5 and 24.9 is considered healthy. · A BMI between 25 and 29.9 is considered overweight. A BMI of 30 or higher is considered obese. If your BMI is in the normal range, it means that you have a lower risk for weight-related health problems. If your BMI is in the overweight or obese range, you may be at increased risk for weight-related health problems, such as high blood pressure, heart disease, stroke, arthritis or joint pain, and diabetes. If your BMI is in the underweight range, you may be at increased risk for health problems such as fatigue, lower protection (immunity) against illness, muscle loss, bone loss, hair loss, and hormone problems. BMI is just one measure of your risk for weight-related health problems. You may be at higher risk for health problems if you are not active, you eat an unhealthy diet, or you drink too much alcohol or use tobacco products. Follow-up care is a key part of your treatment and safety. Be sure to make and go to all appointments, and call your doctor if you are having problems. It's also a good idea to know your test results and keep a list of the medicines you take. How can you care for yourself at home? · Practice healthy eating habits. This includes eating plenty of fruits, vegetables, whole grains, lean protein, and low-fat dairy. · If your doctor recommends it, get more exercise. Walking is a good choice. Bit by bit, increase the amount you walk every day. Try for at least 30 minutes on most days of the week. · Do not smoke. Smoking can increase your risk for health problems.  If you need help quitting, talk to your doctor about stop-smoking programs and medicines. These can increase your chances of quitting for good. · Limit alcohol to 2 drinks a day for men and 1 drink a day for women. Too much alcohol can cause health problems. If you have a BMI higher than 25 · Your doctor may do other tests to check your risk for weight-related health problems. This may include measuring the distance around your waist. A waist measurement of more than 40 inches in men or 35 inches in women can increase the risk of weight-related health problems. · Talk with your doctor about steps you can take to stay healthy or improve your health. You may need to make lifestyle changes to lose weight and stay healthy, such as changing your diet and getting regular exercise. If you have a BMI lower than 18.5 · Your doctor may do other tests to check your risk for health problems. · Talk with your doctor about steps you can take to stay healthy or improve your health. You may need to make lifestyle changes to gain or maintain weight and stay healthy, such as getting more healthy foods in your diet and doing exercises to build muscle. Where can you learn more? Go to http://hyun-perla.info/. Enter S176 in the search box to learn more about \"Body Mass Index: Care Instructions. \" Current as of: October 13, 2016 Content Version: 11.4 © 3870-5431 [employer]. Care instructions adapted under license by [employer] (which disclaims liability or warranty for this information). If you have questions about a medical condition or this instruction, always ask your healthcare professional. [address] any warranty or liability for your use of this information.

## 2020-03-17 LAB
ALBUMIN/CREAT UR: <2 MG/G CREAT (ref 0–29)
CREAT UR-MCNC: 199.4 MG/DL
EST. AVERAGE GLUCOSE BLD GHB EST-MCNC: 146 MG/DL
HBA1C MFR BLD: 6.7 % (ref 4.8–5.6)
MICROALBUMIN UR-MCNC: <3 UG/ML

## 2020-03-29 ENCOUNTER — HOSPITAL ENCOUNTER (EMERGENCY)
Age: 50
Discharge: HOME | End: 2020-03-29
Attending: EMERGENCY MEDICINE
Payer: MEDICAID

## 2020-03-29 ENCOUNTER — APPOINTMENT (OUTPATIENT)
Dept: GENERAL RADIOLOGY | Age: 50
End: 2020-03-29
Attending: NURSE PRACTITIONER
Payer: MEDICAID

## 2020-03-29 VITALS
DIASTOLIC BLOOD PRESSURE: 107 MMHG | HEIGHT: 74 IN | WEIGHT: 240 LBS | RESPIRATION RATE: 16 BRPM | OXYGEN SATURATION: 97 % | HEART RATE: 78 BPM | BODY MASS INDEX: 30.8 KG/M2 | TEMPERATURE: 99 F | SYSTOLIC BLOOD PRESSURE: 145 MMHG

## 2020-03-29 DIAGNOSIS — Z71.89 EDUCATED ABOUT COVID-19 VIRUS INFECTION: ICD-10-CM

## 2020-03-29 DIAGNOSIS — J06.9 ACUTE UPPER RESPIRATORY INFECTION: Primary | ICD-10-CM

## 2020-03-29 PROCEDURE — 99282 EMERGENCY DEPT VISIT SF MDM: CPT

## 2020-03-29 PROCEDURE — 71045 X-RAY EXAM CHEST 1 VIEW: CPT

## 2020-03-29 RX ORDER — ACETAMINOPHEN 500 MG
1000 TABLET ORAL
Qty: 30 TAB | Refills: 0 | Status: SHIPPED | OUTPATIENT
Start: 2020-03-29

## 2020-03-29 RX ORDER — BENZONATATE 100 MG/1
100 CAPSULE ORAL
Qty: 21 CAP | Refills: 0 | Status: SHIPPED | OUTPATIENT
Start: 2020-03-29 | End: 2020-04-05

## 2020-03-29 RX ORDER — IBUPROFEN 600 MG/1
600 TABLET ORAL
Qty: 20 TAB | Refills: 0 | Status: SHIPPED | OUTPATIENT
Start: 2020-03-29 | End: 2022-08-01 | Stop reason: ALTCHOICE

## 2020-03-29 NOTE — ED PROVIDER NOTES
Initial Complaint: Fevers, cough    Started: 2-3 days ago    Endorses: Chest feels hot, cough, sore throat, slight HA and chest congestion. Had PNA one month ago. Some SOB overnight. 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 Denies: N/V    Made better: Tylenol helped some  Made worse: nothing    No further complaints. Past Medical History:  05/17/2019: Abnormal EKG  06/04/2019: Diverticulosis  05/17/2019: DM2 (diabetes mellitus, type 2) ([facility]  No date: Dyslipidemia  11/14/0120: Grade I diastolic dysfunction      Comment:  echo  06/04/2019: Hemorrhoids  No date: Obesity (BMI 30.0-34.9)  No date: Obesity (BMI 30.0-34.9)  No date: Preventative health care  06/28/2019: S/P colonoscopy      Comment:  md viry -10 yrs -hemorrhoids,tics  Past Surgical History:  6/28/2019: COLONOSCOPY; N/A      Comment:  [facility]                ENDOSCOPY  Reviewed      Primary care provider: Selvin Connolly MD           Past Medical History:   Diagnosis Date    Abnormal EKG 05/17/2019    Diverticulosis 06/04/2019    DM2 (diabetes mellitus, type 2) ([facility] 05/17/2019    Dyslipidemia     Grade I diastolic dysfunction 36/23/9392    echo    Hemorrhoids 06/04/2019    Obesity (BMI 30.0-34. 9)     Obesity (BMI 30.0-34. 9)     Preventative health care     S/P colonoscopy 06/28/2019    md viry -10 yrs -hemorrhoids,tics     Past Surgical History:   Procedure Laterality Date    COLONOSCOPY N/A 6/28/2019    COLONOSCOPY performed by [facility]a [facility] END[facility]       Family History:   Problem Relation Age of Onset    Diabetes Mother      Social History     Socioeconomic History    Marital status:      Spouse name: Not on file    Number of children: Not on file    Years of education: Not on file    Highest education level: Not on file   Occupational History    Not on file   Social Needs    Financial resource strain: Not on file  Food insecurity     Worry: Not on file     Inability: Not on file    Transportation needs     Medical: Not on file     Non-medical: Not on file   Tobacco Use    Smoking status: Never Smoker    Smokeless tobacco: Never Used   Substance and Sexual Activity    Alcohol use: Never     Frequency: Never    Drug use: Never    Sexual activity: Yes     Partners: Female     Birth control/protection: None   Lifestyle    Physical activity     Days per week: Not on file     Minutes per session: Not on file    Stress: Not on file   Relationships    Social connections     Talks on phone: Not on file     Gets together: Not on file     Attends Mandaen service: Not on file     Active member of club or organization: Not on file     Attends meetings of clubs or organizations: Not on file     Relationship status: Not on file    Intimate partner violence     Fear of current or ex partner: Not on file     Emotionally abused: Not on file     Physically abused: Not on file     Forced sexual activity: Not on file   Other Topics Concern    Not on file   Social History Narrative    Habits:  He is a lifetime nonsmoker, non drinker, non drug abuser.         Social History:  The patient is , lives with his wife. He was born in [place]. He has been in the [address] [place] [address] since 2005. They have three children, two daughters ages 9 and 11, and a son age 3. He completed his ALIA at [employer]. He was in [facility] for five years. 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         Family History:  Father 76, alive and well. Mother 67 with diabetes. Six brothers and four sisters are alive and well. ALLERGIES: Patient has no known allergies. Review of Systems   Constitutional: Positive for activity change, chills and fever. HENT: Positive for congestion and sore throat. Negative for rhinorrhea. Respiratory: Positive for cough and shortness of breath.     Gastrointestinal: Negative for nausea and vomiting. Neurological: Positive for headaches. Psychiatric/Behavioral: Negative. All other systems reviewed and are negative. Vitals:    03/29/20 0810   BP: (!) 145/107   Pulse: 78   Resp: 16   Temp: 99 °F (37.2 °C)   SpO2: 97%   Weight: 108.9 kg (240 lb)   Height: 6' 2\" (1.88 m)          Physical Exam  Vitals signs and nursing note reviewed. Exam conducted with a chaperone present. Constitutional:       General: He is awake. He is not in acute distress. Appearance: Normal appearance. He is not ill-appearing, toxic-appearing or diaphoretic. HENT:      Head: Normocephalic and atraumatic. Nose: Nose normal. No congestion or rhinorrhea. Right Turbinates: Not swollen. Left Turbinates: Not swollen. Mouth/Throat:      Lips: Pink. Mouth: Mucous membranes are moist.      Pharynx: Oropharynx is clear. Uvula midline. No pharyngeal swelling, oropharyngeal exudate, posterior oropharyngeal erythema or uvula swelling. Tonsils: No tonsillar exudate. Neck:      Musculoskeletal: Normal range of motion and neck supple. Cardiovascular:      Rate and Rhythm: Normal rate. Pulses: Normal pulses. Pulmonary:      Effort: Pulmonary effort is normal.   Skin:     General: Skin is warm and dry. Capillary Refill: Capillary refill takes less than 2 seconds. Neurological:      Mental Status: He is alert and oriented to person, place, and time. Psychiatric:         Attention and Perception: Attention and perception normal.         Mood and Affect: Mood normal.         Speech: Speech normal.         Behavior: Behavior normal. Behavior is cooperative. Thought Content:  Thought content normal.         Judgment: Judgment normal.        [facility]     Procedures      Assessment & Plan:     Orders Placed This Encounter    XR CHEST PORT       Discussed with Evelyne Hahn MD,ED Provider    Raphael Anna NP  03/29/20  8:20 AM      Chest x-ray with improving basilar atelectasis from prior pneumonia. Shani comparison was made to previous imaging. Supportive care. Tessalon, ibuprofen, Tylenol. PCP follow-up. Education about COVID testing and symptomatology. Discussed return precautions. 10:00 AM  Patient re-evaluated. All questions answered. Patient appropriate for discharge. Given return precautions and follow up instructions. LABORATORY TESTS:  Labs Reviewed - No data to display    IMAGING RESULTS:  XR CHEST PORT   Final Result   IMPRESSION:   Left basilar atelectasis, stable to slightly improved since 2/28/2020             MEDICATIONS GIVEN:  Medications - No data to display    IMPRESSION:  1. Acute upper respiratory infection    2. Educated About Covid-19 Virus Infection        PLAN:  1. Current Discharge Medication List      START taking these medications    Details   acetaminophen (TYLENOL) 500 mg tablet Take 2 Tabs by mouth every six (6) hours as needed for Pain or Fever. Qty: 30 Tab, Refills: 0      ibuprofen (MOTRIN) 600 mg tablet Take 1 Tab by mouth every six (6) hours as needed for Pain (fever). Qty: 20 Tab, Refills: 0      benzonatate (Tessalon Perles) 100 mg capsule Take 1 Cap by mouth three (3) times daily as needed for Cough for up to 7 days. Qty: 21 Cap, Refills: 0           2. Follow-up Information     Follow up With Specialties Details Why Contact Info    Judith Vang MD Internal Medicine Schedule an appointment as soon as possible for a visit As needed [address] [address] [phone]      [address] [facility] Emergency Medicine  As needed, If symptoms worsen [address] [facility]  [phone]        3. Return to ED for new or worsening symptoms       Scarlet Silva NP      Please note that this dictation was completed with [employer], the [employer] voice recognition software.   Quite often unanticipated grammatical, syntax, homophones, and other interpretive errors are inadvertently transcribed by the computer software. Please disregard these errors. Please excuse any errors that have escaped final proofreading.

## 2020-03-29 NOTE — DISCHARGE INSTRUCTIONS
Thank you for allowing us to care for you today. Please follow-up with your Primary Care provider in the next 2-3 days if your symptoms do not improve. Plan for home:     Tessalon Perles every 8 hours as needed for cough. Tylenol 1000 mg alternating with Ibuprofen 600mg every 6 hours for pain control. Example: 8am take Ibuprofen. 11am take tylenol. 2pm take ibuprofen. 5pm take tylenol. 8pm take ibuprofen. 11pm take tylenol. You may continue this process overnight if you have continued pain/discomfort. CONSIDER YOURSELF COVID-19 POSITIVE! Stay at home except to get medical care. Seek medical attention if you develop worsening symptoms or new concerns such as severe shortness of breath, chest pain, etc.    Separate yourself from other people and animals in your home. If possible, stay in a separate room and use a separate bathroom from others in your house. Restrict contact with pets, as there is a possibility of transmission of viruses. Avoid intimate contact with others. This is included hand holding, kissing, hugging as well as any sexual contact. Wear a facemask when you are around other people. Cover your mouth when you cough or sneeze. Wash your hands often with warm soapy water for at least 20 seconds. If soap and water are not available, use an alcohol based hand . Clean all high touch surfaces everyday. For example: counters, tabletops, doorknobs, bathroom fixtures, toilets, phones, keyboards, tablets, and bedside tables. Monitor your symptoms at home. Seek prompt medical attention if you symptoms worsen. (i.e. difficulty breathing). Don't forget to change your toothbrush and change your bed linen frequently! Come back to the ER if you have worsening symptoms, difficulty breathing or speaking in full sentences, fevers over 100.9 that do not improved with tylenol or Ibuprofen, shaking chills, nausea or vomiting.        Patient Education        Upper Respiratory Infection (Cold): Care Instructions  Your Care Instructions    An upper respiratory infection, or URI, is an infection of the nose, sinuses, or throat. URIs are spread by coughs, sneezes, and direct contact. The common cold is the most frequent kind of URI. The flu and sinus infections are other kinds of URIs. Almost all URIs are caused by viruses. Antibiotics won't cure them. But you can treat most infections with home care. This may include drinking lots of fluids and taking over-the-counter pain medicine. You will probably feel better in 4 to 10 days. The doctor has checked you carefully, but problems can develop later. If you notice any problems or new symptoms, get medical treatment right away. Follow-up care is a key part of your treatment and safety. Be sure to make and go to all appointments, and call your doctor if you are having problems. It's also a good idea to know your test results and keep a list of the medicines you take. How can you care for yourself at home? · To prevent dehydration, drink plenty of fluids, enough so that your urine is light yellow or clear like water. Choose water and other caffeine-free clear liquids until you feel better. If you have kidney, heart, or liver disease and have to limit fluids, talk with your doctor before you increase the amount of fluids you drink. · Take an over-the-counter pain medicine, such as acetaminophen (Tylenol), ibuprofen (Advil, Motrin), or naproxen (Aleve). Read and follow all instructions on the label. · Before you use cough and cold medicines, check the label. These medicines may not be safe for young children or for people with certain health problems. · Be careful when taking over-the-counter cold or flu medicines and Tylenol at the same time. Many of these medicines have acetaminophen, which is Tylenol. Read the labels to make sure that you are not taking more than the recommended dose.  Too much acetaminophen (Tylenol) can be harmful. · Get plenty of rest.  · Do not smoke or allow others to smoke around you. If you need help quitting, talk to your doctor about stop-smoking programs and medicines. These can increase your chances of quitting for good. When should you call for help? Call 911 anytime you think you may need emergency care. For example, call if:    · You have severe trouble breathing.    Call your doctor now or seek immediate medical care if:    · You seem to be getting much sicker.     · You have new or worse trouble breathing.     · You have a new or higher fever.     · You have a new rash.    Watch closely for changes in your health, and be sure to contact your doctor if:    · You have a new symptom, such as a sore throat, an earache, or sinus pain.     · You cough more deeply or more often, especially if you notice more mucus or a change in the color of your mucus.     · You do not get better as expected. Where can you learn more? Go to http://hyun-perla.info/  Enter K520 in the search box to learn more about \"Upper Respiratory Infection (Cold): Care Instructions. \"  Current as of: June 9, 2019Content Version: 12.4  © 4058-6075 [employer]. Care instructions adapted under license by [employer] (which disclaims liability or warranty for this information). If you have questions about a medical condition or this instruction, always ask your healthcare professional. [place] [address] any warranty or liability for your use of this information.

## 2020-03-29 NOTE — ED TRIAGE NOTES
Pt states that he has had cold symptoms for the past 2 days with a cough about 5 times a day. Pt states that he had a slight headache and had chest congestion yesterday and felt like he had SOB last night. Pt had pneumonia a month ago and felt like he had a fever for the past 2 nights. Pt states that he has had a sore throat for 2 days.

## 2020-03-30 ENCOUNTER — PATIENT OUTREACH (OUTPATIENT)
Dept: CASE MANAGEMENT | Age: 50
End: 2020-03-30

## 2020-03-31 ENCOUNTER — PATIENT OUTREACH (OUTPATIENT)
Dept: CASE MANAGEMENT | Age: 50
End: 2020-03-31

## 2020-03-31 NOTE — PROGRESS NOTES
Care transitions nurse     Pt called to CTN reporting ongoing diarrhea - whenever he drinks or eats anything - having diarrhea and active bowel sounds -   No nausea, vomiting - hydrating - no active stomach pain. [facility] did not accept his insurance - [employer] -   He has provider - Dr. Adriane Nolasco - advised to call office re: duration, symptoms and for advisement. Continue hydration - small amounts frequently - bland foods - Gatorade for electrolytes, and yogurt/ probiotics -    Plan - he will call provider for advisement or visit.   Vianca Gandhi RN, [facility]  Care transitions nurse [phone]  [facility] Coordination Team

## 2020-04-09 ENCOUNTER — VIRTUAL VISIT (OUTPATIENT)
Dept: INTERNAL MEDICINE CLINIC | Age: 50
End: 2020-04-09

## 2020-04-09 DIAGNOSIS — I51.89 GRADE I DIASTOLIC DYSFUNCTION: Primary | ICD-10-CM

## 2020-04-09 DIAGNOSIS — L30.4 INTERTRIGO: ICD-10-CM

## 2020-04-09 DIAGNOSIS — R05.9 COUGH: ICD-10-CM

## 2020-04-09 DIAGNOSIS — K57.90 DIVERTICULOSIS: ICD-10-CM

## 2020-04-09 DIAGNOSIS — E11.9 TYPE 2 DIABETES MELLITUS WITHOUT COMPLICATION, WITHOUT LONG-TERM CURRENT USE OF INSULIN (HCC): ICD-10-CM

## 2020-04-09 DIAGNOSIS — E78.5 DYSLIPIDEMIA: ICD-10-CM

## 2020-04-09 DIAGNOSIS — E66.9 OBESITY (BMI 30.0-34.9): ICD-10-CM

## 2020-04-09 RX ORDER — AZITHROMYCIN 250 MG/1
250 TABLET, FILM COATED ORAL SEE ADMIN INSTRUCTIONS
Qty: 6 TAB | Refills: 1 | Status: SHIPPED | OUTPATIENT
Start: 2020-04-09 | End: 2020-04-14

## 2020-04-09 RX ORDER — ALBUTEROL SULFATE 90 UG/1
2 AEROSOL, METERED RESPIRATORY (INHALATION)
Qty: 1 INHALER | Refills: 11 | Status: SHIPPED | OUTPATIENT
Start: 2020-04-09 | End: 2022-04-04

## 2020-04-09 NOTE — ASSESSMENT & PLAN NOTE
Stable, based on history, physical exam and review of pertinent labs, studies and medications; meds reconciled; continue current treatment plan. Key Antihyperglycemic Medications             metFORMIN ER (GLUCOPHAGE XR) 500 mg tablet (Taking) Take 1 Tab by mouth daily (with dinner). Other Key Diabetic Medications             atorvastatin (LIPITOR) 10 mg tablet (Taking) Take 1 Tab by mouth daily.         Lab Results   Component Value Date/Time    Hemoglobin A1c 6.7 03/16/2020 04:41 PM    Glucose 128 05/17/2019 10:56 AM    Creatinine 1.02 05/17/2019 10:56 AM    Microalb/Creat ratio (ug/mg creat.) <2 03/16/2020 04:41 PM    Cholesterol, total 143 11/11/2019 05:39 PM    HDL Cholesterol 56 11/11/2019 05:39 PM    LDL, calculated 56 11/11/2019 05:39 PM    Triglyceride 153 11/11/2019 05:39 PM     Diabetic Foot and Eye Exam HM Status   Topic Date Due    Eye Exam  01/21/1980    Diabetic Foot Care  03/16/2021

## 2020-04-09 NOTE — PROGRESS NOTES
Consent: Rene Chand, who was seen by synchronous (real-time) audio-video technology, and/or his healthcare decision maker, is aware that this patient-initiated, Telehealth encounter on 4/9/2020 is a billable service, with coverage as determined by his insurance carrier. He is aware that he may receive a bill and has provided verbal consent to proceed: Yes. Assessment & Plan:   Diagnoses and all orders for this visit:    1. Grade I diastolic dysfunction    2. Type 2 diabetes mellitus without complication, without long-term current use of insulin (HCC)  Assessment & Plan:  Stable, based on history, physical exam and review of pertinent labs, studies and medications; meds reconciled; continue current treatment plan. Key Antihyperglycemic Medications             metFORMIN ER (GLUCOPHAGE XR) 500 mg tablet (Taking) Take 1 Tab by mouth daily (with dinner). Other Key Diabetic Medications             atorvastatin (LIPITOR) 10 mg tablet (Taking) Take 1 Tab by mouth daily. Lab Results   Component Value Date/Time    Hemoglobin A1c 6.7 03/16/2020 04:41 PM    Glucose 128 05/17/2019 10:56 AM    Creatinine 1.02 05/17/2019 10:56 AM    Microalb/Creat ratio (ug/mg creat.) <2 03/16/2020 04:41 PM    Cholesterol, total 143 11/11/2019 05:39 PM    HDL Cholesterol 56 11/11/2019 05:39 PM    LDL, calculated 56 11/11/2019 05:39 PM    Triglyceride 153 11/11/2019 05:39 PM     Diabetic Foot and Eye Exam HM Status   Topic Date Due    Eye Exam  01/21/1980    Diabetic Foot Care  03/16/2021       Orders:  -     azithromycin (ZITHROMAX) 250 mg tablet; Take 1 Tab by mouth See Admin Instructions for 5 days. 3. Diverticulosis    4. Dyslipidemia    5. Obesity (BMI 30.0-34.9)    6. Intertrigo    7. Cough  -     azithromycin (ZITHROMAX) 250 mg tablet; Take 1 Tab by mouth See Admin Instructions for 5 days. Other orders  -     albuterol (PROVENTIL HFA, VENTOLIN HFA, PROAIR HFA) 90 mcg/actuation inhaler;  Take 2 Puffs by inhalation every four (4) hours as needed for Wheezing. The symptoms are not compatible with cardiac decompensation from the grade 1 diastolic dysfunction. Known history of diabetes mellitus type 2 for which he is taking metformin and keeps his blood sugars in the 1 20-1 30 range. History of diverticulosis without symptoms suggestive of diverticulitis. Known history of dyslipidemia for which he takes a statin on a regular basis. Obesity for which we encourage a heart healthy diabetic diet. The cough is bothersome to me but particularly since his symptoms are suggestive of hemoptysis. Will order for giving him a Z-Godwin in addition to a inhaler since when he coughed today it sounded to be a bronchitic cough. He will contact us if he has further symptoms. We also given symptoms that would suggest his contact the coronavirus and give him suggestions to avoid the disease. Follow-up and Dispositions    · Return in about 3 months (around 7/9/2020). 712  Subjective:   Frances Velasco is a 48 y.o. male who was seen for Cough and Sore Throat  Patient is seen for a telehealth visit. He complains of a dry cough and sore throat. The throat feels like it is tight. He feels better when he is not coughing and sitting. When he tries to talk he notes the cough returns. He was seen in emergency room on March 29 where he complained that his chest felt hot he had a cough and sore throat. He recalled that he had pneumonia about a month ago. The chest x-ray revealed lower lobe atelectasis. He was given Oscar Moores for the cough and returned home. He found the process did not help that much. He received cough DM from the pharmacy which seems to help. On questioning he had some red phlegm as well as some mucoid phlegm. He has had no fever. Patient seen for evaluation.   He has a known history of diabetes mellitus type 2 diverticulosis dyslipidemia obesity and is seen for evaluation. Prior to Admission medications    Medication Sig Start Date End Date Taking? Authorizing Provider   albuterol (PROVENTIL HFA, VENTOLIN HFA, PROAIR HFA) 90 mcg/actuation inhaler Take 2 Puffs by inhalation every four (4) hours as needed for Wheezing. 4/9/20  Yes Samina Carlos MD   azithromycin (ZITHROMAX) 250 mg tablet Take 1 Tab by mouth See Admin Instructions for 5 days. 4/9/20 4/14/20 Yes Samina Carlos MD   acetaminophen (TYLENOL) 500 mg tablet Take 2 Tabs by mouth every six (6) hours as needed for Pain or Fever. 3/29/20  Yes Diogenes XIAO NP   ibuprofen (MOTRIN) 600 mg tablet Take 1 Tab by mouth every six (6) hours as needed for Pain (fever). 3/29/20  Yes Diogenes XIAO NP   clotrimazole-betamethasone (LOTRISONE) topical cream Apply  to affected area two (2) times a day. 10/17/19  Yes Samina Carlos MD   Blood-Glucose Meter (ONETOUCH VERIO FLEX) misc Used to test blood sugars 3 times daily 6/12/19  Yes Samina Carlos MD   glucose blood VI test strips (ONETOUCH VERIO) strip Used to test blood sugar 3 times daily. 6/12/19  Yes Samina Carlos MD   atorvastatin (LIPITOR) 10 mg tablet Take 1 Tab by mouth daily. 5/20/19  Yes Samina Carlos MD   metFORMIN ER (GLUCOPHAGE XR) 500 mg tablet Take 1 Tab by mouth daily (with dinner).  5/20/19  Yes Samina Carlos MD     No Known Allergies  REVIEW OF SYSTEMS:  General: negative for - chills or fever  ENT: negative for - headaches, nasal congestion or tinnitus  Respiratory: negative for - cough, hemoptysis, shortness of breath or wheezing  Cardiovascular : negative for - chest pain, edema, palpitations or shortness of breath  Gastrointestinal: negative for - abdominal pain, blood in stools, heartburn or nausea/vomiting  Genito-Urinary: no dysuria, trouble voiding, or hematuria  Musculoskeletal: negative for - gait disturbance, joint pain, joint stiffness or joint swelling  Neurological: no TIA or stroke symptoms  Hematologic: no bruises, no bleeding, no swollen glands  Integument: no lumps, mole changes, nail changes or rash  Endocrine:no malaise/lethargy or unexpected weight changes      Patient Active Problem List    Diagnosis Date Noted    Hemorrhoids 06/04/2019    Diverticulosis 06/04/2019    Grade I diastolic dysfunction 66/33/0808    Dyslipidemia     Intertrigo 05/17/2019    DM2 (diabetes mellitus, type 2) ([facility] 05/17/2019    Abnormal EKG 05/17/2019    Obesity (BMI 30.0-34. 9)      Current Outpatient Medications   Medication Sig Dispense Refill    albuterol (PROVENTIL HFA, VENTOLIN HFA, PROAIR HFA) 90 mcg/actuation inhaler Take 2 Puffs by inhalation every four (4) hours as needed for Wheezing. 1 Inhaler 11    azithromycin (ZITHROMAX) 250 mg tablet Take 1 Tab by mouth See Admin Instructions for 5 days. 6 Tab 1    acetaminophen (TYLENOL) 500 mg tablet Take 2 Tabs by mouth every six (6) hours as needed for Pain or Fever. 30 Tab 0    ibuprofen (MOTRIN) 600 mg tablet Take 1 Tab by mouth every six (6) hours as needed for Pain (fever). 20 Tab 0    clotrimazole-betamethasone (LOTRISONE) topical cream Apply  to affected area two (2) times a day. 45 g 11    Blood-Glucose Meter (ONETOUCH VERIO FLEX) misc Used to test blood sugars 3 times daily 1 Each 0    glucose blood VI test strips (ONETOUCH VERIO) strip Used to test blood sugar 3 times daily. 100 Strip 11    atorvastatin (LIPITOR) 10 mg tablet Take 1 Tab by mouth daily. 30 Tab 11    metFORMIN ER (GLUCOPHAGE XR) 500 mg tablet Take 1 Tab by mouth daily (with dinner). 30 Tab 11     No Known Allergies  Past Medical History:   Diagnosis Date    Abnormal EKG 05/17/2019    Diverticulosis 06/04/2019    DM2 (diabetes mellitus, type 2) ([facility] 05/17/2019    Dyslipidemia     Grade I diastolic dysfunction 31/49/6511    echo    Hemorrhoids 06/04/2019    Obesity (BMI 30.0-34. 9)     Obesity (BMI 30.0-34. 9)     Preventative health care     S/P colonoscopy 06/28/2019    md viry -10 yrs -hemorrhoids,tics     Past Surgical History:   Procedure Laterality Date    COLONOSCOPY N/A 6/28/2019    COLONOSCOPY performed by Beatris Mclaughlin MD at [facility] ENDOSCOPY     Family History   Problem Relation Age of Onset    Diabetes Mother      Social History     Tobacco Use    Smoking status: Never Smoker    Smokeless tobacco: Never Used   Substance Use Topics    Alcohol use: Never     Frequency: Never       ROS        Objective:   Vital Signs: (As obtained by patient/caregiver at home)            We discussed the expected course, resolution and complications of the diagnosis(es) in detail. Medication risks, benefits, costs, interactions, and alternatives were discussed as indicated. I advised him to contact the office if his condition worsens, changes or fails to improve as anticipated. He expressed understanding with the diagnosis(es) and plan. Ari Bauer is a 48 y.o. male being evaluated by a video visit encounter for concerns as above. A caregiver was present when appropriate. Due to this being a TeleHealth encounter (During [facility] public health emergency), evaluation of the following organ systems was limited: Vitals/Constitutional/EENT/Resp/CV/GI//MS/Neuro/Skin/Heme-Lymph-Imm. Pursuant to the emergency declaration under the [facility] waiver authority and the [employer] Act, this Virtual  Visit was conducted, with patient's (and/or legal guardian's) consent, to reduce the patient's risk of exposure to COVID-19 and provide necessary medical care. Services were provided through a video synchronous discussion virtually to substitute for in-person clinic visit. Patient and provider were located at their individual homes.         Ben Gilliland MD

## 2020-04-09 NOTE — PROGRESS NOTES
Chief Complaint   Patient presents with    Cough    Sore Throat     1. Have you been to the ER, urgent care clinic since your last visit? Hospitalized since your last visit? No    2. Have you seen or consulted any other health care providers outside of the [address] [place] [address] since your last visit? Include any pap smears or colon screening.  No

## 2020-04-30 ENCOUNTER — PATIENT OUTREACH (OUTPATIENT)
Dept: FAMILY MEDICINE CLINIC | Age: 50
End: 2020-04-30

## 2020-04-30 NOTE — PROGRESS NOTES
Patient resolved from Transition of Care episode on 4/30/2020  Patient/family has been provided the following resources and education related to COVID-19:                         Signs, symptoms and red flags related to COVID-19            CDC exposure and quarantine guidelines            Conduit exposure contact - [phone]            Contact for their local Department of Health               Pt reports being tested for COVID yesterday and is waiting for results. Pt reports fever, cough, fatigue, dizziness. Encouraged pt to stay well hydrated, rest, take Vict C/zinc. Stay quarantined until results are received. Pt conveyed understanding. Pt requested for this CTN to call back tomorrow. Advised pt if becomes SOB and high fever to call 911 or go to nearest ED. No further outreach scheduled with this CTN/ACM. Episode of Care resolved. Patient has this CTN/ACM contact information if future needs arise.

## 2020-05-18 RX ORDER — METFORMIN HYDROCHLORIDE 500 MG/1
500 TABLET, EXTENDED RELEASE ORAL
Qty: 30 TAB | Refills: 11 | Status: SHIPPED | OUTPATIENT
Start: 2020-05-18 | End: 2020-06-22

## 2020-05-18 RX ORDER — ATORVASTATIN CALCIUM 10 MG/1
10 TABLET, FILM COATED ORAL DAILY
Qty: 30 TAB | Refills: 11 | Status: SHIPPED | OUTPATIENT
Start: 2020-05-18 | End: 2020-06-22 | Stop reason: SDUPTHER

## 2020-06-09 ENCOUNTER — OFFICE VISIT (OUTPATIENT)
Dept: INTERNAL MEDICINE CLINIC | Age: 50
End: 2020-06-09

## 2020-06-09 VITALS
SYSTOLIC BLOOD PRESSURE: 124 MMHG | BODY MASS INDEX: 31.92 KG/M2 | HEART RATE: 82 BPM | OXYGEN SATURATION: 98 % | TEMPERATURE: 98.2 F | WEIGHT: 248.7 LBS | DIASTOLIC BLOOD PRESSURE: 83 MMHG | RESPIRATION RATE: 18 BRPM | HEIGHT: 74 IN

## 2020-06-09 DIAGNOSIS — E66.9 OBESITY (BMI 30.0-34.9): ICD-10-CM

## 2020-06-09 DIAGNOSIS — M54.50 CHRONIC BILATERAL LOW BACK PAIN WITHOUT SCIATICA: ICD-10-CM

## 2020-06-09 DIAGNOSIS — R05.9 COUGH: ICD-10-CM

## 2020-06-09 DIAGNOSIS — I51.89 GRADE I DIASTOLIC DYSFUNCTION: ICD-10-CM

## 2020-06-09 DIAGNOSIS — E78.5 DYSLIPIDEMIA: ICD-10-CM

## 2020-06-09 DIAGNOSIS — G89.29 CHRONIC BILATERAL LOW BACK PAIN WITHOUT SCIATICA: ICD-10-CM

## 2020-06-09 DIAGNOSIS — E11.9 TYPE 2 DIABETES MELLITUS WITHOUT COMPLICATION, WITHOUT LONG-TERM CURRENT USE OF INSULIN (HCC): Primary | ICD-10-CM

## 2020-06-09 NOTE — PROGRESS NOTES
1. Have you been to the ER, urgent care clinic since your last visit? Hospitalized since your last visit? No    2. Have you seen or consulted any other health care providers outside of the [address] [place] [address] since your last visit? Include any pap smears or colon screening.  No      Follow up

## 2020-06-09 NOTE — PROGRESS NOTES
SPORTS MEDICINE AND PRIMARY CARE  Rito Maciel MD, [address] [address]  Phone:  [phone]  Fax: [phone]       Chief Complaint   Patient presents with    Diabetes   . SUBJECTIVE:    Raghu Batista is a 48 y.o. male Patient returns today with a known history of obesity, dyslipidemia, grade 1 diastolic dysfunction, type 2 diabetes and is seen for evaluation. Patient had his refills of his medications in March. He complains of low back pain, wants to know how his lungs are doing, and is seen for evaluation. 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 Three of his clients  related to COVID virus and were dialysis patients. Current Outpatient Medications   Medication Sig Dispense Refill    atorvastatin (LIPITOR) 10 mg tablet Take 1 Tab by mouth daily. 30 Tab 11    albuterol (PROVENTIL HFA, VENTOLIN HFA, PROAIR HFA) 90 mcg/actuation inhaler Take 2 Puffs by inhalation every four (4) hours as needed for Wheezing. 1 Inhaler 11    acetaminophen (TYLENOL) 500 mg tablet Take 2 Tabs by mouth every six (6) hours as needed for Pain or Fever. 30 Tab 0    ibuprofen (MOTRIN) 600 mg tablet Take 1 Tab by mouth every six (6) hours as needed for Pain (fever). 20 Tab 0    clotrimazole-betamethasone (LOTRISONE) topical cream Apply  to affected area two (2) times a day. 45 g 11    Blood-Glucose Meter (ONETOUCH VERIO FLEX) misc Used to test blood sugars 3 times daily 1 Each 0    glucose blood VI test strips (ONETOUCH VERIO) strip Used to test blood sugar 3 times daily. 100 Strip 11    metFORMIN ER (GLUCOPHAGE XR) 500 mg tablet Take 1 Tab by mouth daily (with dinner).  27 Tab 11     Past Medical History:   Diagnosis Date    Abnormal EKG 2019    Diverticulosis 2019    DM2 (diabetes mellitus, type 2) ([facility] [address] 2019    Dyslipidemia     Grade I diastolic dysfunction     echo    Hemorrhoids 2019  Low back pain     Obesity (BMI 30.0-34. 9)     Obesity (BMI 30.0-34. 9)     Preventative health care     S/P colonoscopy 06/28/2019    md viry -10 yrs -hemorrhoids,tics     Past Surgical History:   Procedure Laterality Date    COLONOSCOPY N/A 6/28/2019    COLONOSCOPY performed by Jenifer Pope MD [facility]     No Known Allergies      REVIEW OF SYSTEMS:  General: negative for - chills or fever  ENT: negative for - headaches, nasal congestion or tinnitus  Respiratory: negative for - cough, hemoptysis, shortness of breath or wheezing  Cardiovascular : negative for - chest pain, edema, palpitations or shortness of breath  Gastrointestinal: negative for - abdominal pain, blood in stools, heartburn or nausea/vomiting  Genito-Urinary: no dysuria, trouble voiding, or hematuria  Musculoskeletal: negative for - gait disturbance, joint pain, joint stiffness or joint swelling  Neurological: no TIA or stroke symptoms  Hematologic: no bruises, no bleeding, no swollen glands  Integument: no lumps, mole changes, nail changes or rash  Endocrine: no malaise/lethargy or unexpected weight changes      Social History     Socioeconomic History    Marital status:      Spouse name: Not on file    Number of children: Not on file    Years of education: Not on file    Highest education level: Not on file   Tobacco Use    Smoking status: Never Smoker    Smokeless tobacco: Never Used   Substance and Sexual Activity    Alcohol use: Never     Frequency: Never    Drug use: Never    Sexual activity: Yes     Partners: Female     Birth control/protection: None   Social History Narrative    Habits:  He is a lifetime nonsmoker, non drinker, non drug abuser.         Social History:  The patient is , lives with his wife. He was born in [place]. He has been in the [address] [place] [address] since 2005. They have three children, two daughters ages 9 and 11, and a son age 3. He completed his ALIA at [employer].   He was in [facility] [employer] for five years. 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 [employer] background is [employer].         Family History:  Father 76, alive and well. Mother 67 with diabetes. Six brothers and four sisters are alive and well. Family History   Problem Relation Age of Onset    Diabetes Mother        OBJECTIVE:    Visit Vitals  /83   Pulse 82   Temp 98.2 °F (36.8 °C) (Oral)   Resp 18   Ht 6' 2\" (1.88 m)   Wt 248 lb 11.2 oz (112.8 kg)   SpO2 98%   BMI 31.93 kg/m²     CONSTITUTIONAL: well , well nourished, appears age appropriate  EYES: perrla, eom intact  ENMT:moist mucous membranes, pharynx clear  NECK: supple. Thyroid normal  RESPIRATORY: Chest: clear bilaterally   CARDIOVASCULAR: Heart: regular rate and rhythm  GASTROINTESTINAL: Abdomen: soft, bowel sounds active  HEMATOLOGIC: no pathological lymph nodes palpated  MUSCULOSKELETAL: Extremities: no edema, pulse 1+   INTEGUMENT: No unusual rashes or suspicious skin lesions noted. Nails appear normal.  NEUROLOGIC: non-focal exam   MENTAL STATUS: alert and oriented, appropriate affect           ASSESSMENT:  1. Type 2 diabetes mellitus without complication, without long-term current use of insulin ([address]    2. Grade I diastolic dysfunction    3. Dyslipidemia    4. Obesity (BMI 30.0-34.9)    5. Cough    6. Chronic bilateral low back pain without sciatica      Patient with a known history of diabetes mellitus type 2, on Metformin. We will check hemoglobin A1c today for control. Echocardiogram revealed grade 1 diastolic dysfunction. He is completely asymptomatic. We have him on Atorvastatin for his dyslipidemia. We will check his cholesterol today to see what the progress is. He has a known history of obesity and he has decided to gain 8 pounds since he was last checked. I think this is partly related to his inactivity. He has low back pain and for that reason we will ask for an x-ray of his lumbar spine.     The last time we saw him was a video conference and he had a cough at that time. We put him on a Z-Godwin. He completed the Z-Godwin, cough is gone and lungs are clear to auscultation. He wants to be sure he does not have any infection in his lungs and therefore we will ask for a chest xray. He will be back to see us in four to six months, sooner if he has any problems. I have discussed the diagnosis with the patient and the intended plan as seen in the  orders above. The patient understands and agees with the plan. The patient has   received an after visit summary and questions were answered concerning  future plans  Patient labs and/or xrays were reviewed  Past records were reviewed. PLAN:  .  Orders Placed This Encounter    XR CHEST PA LAT    XR SPINE LUMB 2 OR 3 V    URINALYSIS W/ RFLX MICROSCOPIC    CBC WITH AUTOMATED DIFF    METABOLIC PANEL, COMPREHENSIVE    LIPID PANEL    PROSTATE SPECIFIC AG       Follow-up and Dispositions    · Return in about 4 months (around 10/9/2020). ATTENTION:   This medical record was transcribed using an electronic medical records system. Although proofread, it may and can contain electronic and spelling errors. Other human spelling and other errors may be present. Corrections may be executed at a later time. Please feel free to contact us for any clarifications as needed.

## 2020-06-10 LAB
ALBUMIN SERPL-MCNC: 4.4 G/DL (ref 4–5)
ALBUMIN/GLOB SERPL: 1.8 {RATIO} (ref 1.2–2.2)
ALP SERPL-CCNC: 80 IU/L (ref 39–117)
ALT SERPL-CCNC: 39 IU/L (ref 0–44)
APPEARANCE UR: CLEAR
AST SERPL-CCNC: 22 IU/L (ref 0–40)
BASOPHILS # BLD AUTO: 0 X10E3/UL (ref 0–0.2)
BASOPHILS NFR BLD AUTO: 1 %
BILIRUB SERPL-MCNC: 0.6 MG/DL (ref 0–1.2)
BILIRUB UR QL STRIP: NEGATIVE
BUN SERPL-MCNC: 13 MG/DL (ref 6–24)
BUN/CREAT SERPL: 15 (ref 9–20)
CALCIUM SERPL-MCNC: 9.3 MG/DL (ref 8.7–10.2)
CHLORIDE SERPL-SCNC: 105 MMOL/L (ref 96–106)
CHOLEST SERPL-MCNC: 165 MG/DL (ref 100–199)
CO2 SERPL-SCNC: 19 MMOL/L (ref 20–29)
COLOR UR: YELLOW
CREAT SERPL-MCNC: 0.87 MG/DL (ref 0.76–1.27)
EOSINOPHIL # BLD AUTO: 0 X10E3/UL (ref 0–0.4)
EOSINOPHIL NFR BLD AUTO: 0 %
ERYTHROCYTE [DISTWIDTH] IN BLOOD BY AUTOMATED COUNT: 14.5 % (ref 11.6–15.4)
GLOBULIN SER CALC-MCNC: 2.5 G/DL (ref 1.5–4.5)
GLUCOSE SERPL-MCNC: 132 MG/DL (ref 65–99)
GLUCOSE UR QL: NEGATIVE
HCT VFR BLD AUTO: 43.8 % (ref 37.5–51)
HDLC SERPL-MCNC: 50 MG/DL
HGB BLD-MCNC: 15.3 G/DL (ref 13–17.7)
HGB UR QL STRIP: NEGATIVE
IMM GRANULOCYTES # BLD AUTO: 0 X10E3/UL (ref 0–0.1)
IMM GRANULOCYTES NFR BLD AUTO: 0 %
KETONES UR QL STRIP: NEGATIVE
LDLC SERPL CALC-MCNC: 42 MG/DL (ref 0–99)
LEUKOCYTE ESTERASE UR QL STRIP: NEGATIVE
LYMPHOCYTES # BLD AUTO: 2.2 X10E3/UL (ref 0.7–3.1)
LYMPHOCYTES NFR BLD AUTO: 44 %
MCH RBC QN AUTO: 28.4 PG (ref 26.6–33)
MCHC RBC AUTO-ENTMCNC: 34.9 G/DL (ref 31.5–35.7)
MCV RBC AUTO: 81 FL (ref 79–97)
MICRO URNS: NORMAL
MONOCYTES # BLD AUTO: 0.5 X10E3/UL (ref 0.1–0.9)
MONOCYTES NFR BLD AUTO: 9 %
NEUTROPHILS # BLD AUTO: 2.2 X10E3/UL (ref 1.4–7)
NEUTROPHILS NFR BLD AUTO: 46 %
NITRITE UR QL STRIP: NEGATIVE
PH UR STRIP: 5 [PH] (ref 5–7.5)
PLATELET # BLD AUTO: 264 X10E3/UL (ref 150–450)
POTASSIUM SERPL-SCNC: 4.2 MMOL/L (ref 3.5–5.2)
PROT SERPL-MCNC: 6.9 G/DL (ref 6–8.5)
PROT UR QL STRIP: NEGATIVE
PSA SERPL-MCNC: 0.5 NG/ML (ref 0–4)
RBC # BLD AUTO: 5.38 X10E6/UL (ref 4.14–5.8)
SODIUM SERPL-SCNC: 139 MMOL/L (ref 134–144)
SP GR UR: 1.02 (ref 1–1.03)
TRIGL SERPL-MCNC: 363 MG/DL (ref 0–149)
UROBILINOGEN UR STRIP-MCNC: 0.2 MG/DL (ref 0.2–1)
VLDLC SERPL CALC-MCNC: 73 MG/DL (ref 5–40)
WBC # BLD AUTO: 4.9 X10E3/UL (ref 3.4–10.8)

## 2020-06-22 RX ORDER — ATORVASTATIN CALCIUM 10 MG/1
10 TABLET, FILM COATED ORAL DAILY
Qty: 30 TAB | Refills: 11 | Status: SHIPPED | OUTPATIENT
Start: 2020-06-22 | End: 2021-06-01 | Stop reason: SDUPTHER

## 2020-06-22 RX ORDER — METFORMIN HYDROCHLORIDE 500 MG/1
TABLET ORAL
Qty: 90 TAB | Refills: 3 | Status: SHIPPED | OUTPATIENT
Start: 2020-06-22 | End: 2021-07-30

## 2020-09-09 ENCOUNTER — APPOINTMENT (OUTPATIENT)
Dept: GENERAL RADIOLOGY | Age: 50
End: 2020-09-09
Attending: PHYSICIAN ASSISTANT
Payer: MEDICAID

## 2020-09-09 ENCOUNTER — HOSPITAL ENCOUNTER (EMERGENCY)
Age: 50
Discharge: HOME | End: 2020-09-09
Attending: EMERGENCY MEDICINE | Admitting: EMERGENCY MEDICINE
Payer: MEDICAID

## 2020-09-09 VITALS
OXYGEN SATURATION: 99 % | HEART RATE: 62 BPM | RESPIRATION RATE: 16 BRPM | TEMPERATURE: 97.9 F | SYSTOLIC BLOOD PRESSURE: 134 MMHG | DIASTOLIC BLOOD PRESSURE: 93 MMHG

## 2020-09-09 DIAGNOSIS — K61.0 PERIANAL ABSCESS: ICD-10-CM

## 2020-09-09 DIAGNOSIS — M94.0 COSTOCHONDRITIS: Primary | ICD-10-CM

## 2020-09-09 PROCEDURE — 74011000250 HC RX REV CODE- 250: Performed by: PHYSICIAN ASSISTANT

## 2020-09-09 PROCEDURE — 99283 EMERGENCY DEPT VISIT LOW MDM: CPT

## 2020-09-09 PROCEDURE — 75810000115

## 2020-09-09 PROCEDURE — 71046 X-RAY EXAM CHEST 2 VIEWS: CPT

## 2020-09-09 RX ORDER — LIDOCAINE HYDROCHLORIDE AND EPINEPHRINE 10; 10 MG/ML; UG/ML
1.5 INJECTION, SOLUTION INFILTRATION; PERINEURAL
Status: COMPLETED | OUTPATIENT
Start: 2020-09-09 | End: 2020-09-09

## 2020-09-09 RX ORDER — NAPROXEN 500 MG/1
500 TABLET ORAL 2 TIMES DAILY WITH MEALS
Qty: 20 TAB | Refills: 0 | Status: SHIPPED | OUTPATIENT
Start: 2020-09-09 | End: 2020-09-19

## 2020-09-09 RX ORDER — LIDOCAINE HYDROCHLORIDE AND EPINEPHRINE 10; 10 MG/ML; UG/ML
INJECTION, SOLUTION INFILTRATION; PERINEURAL
Status: DISCONTINUED
Start: 2020-09-09 | End: 2020-09-09 | Stop reason: HOSPADM

## 2020-09-09 RX ORDER — BACITRACIN 500 [USP'U]/G
OINTMENT TOPICAL 3 TIMES DAILY
Qty: 1 TUBE | Refills: 0 | Status: SHIPPED | OUTPATIENT
Start: 2020-09-09 | End: 2020-09-19

## 2020-09-09 RX ADMIN — LIDOCAINE HYDROCHLORIDE,EPINEPHRINE BITARTRATE 15 MG: 10; .01 INJECTION, SOLUTION INFILTRATION; PERINEURAL at 17:27

## 2020-09-09 NOTE — DISCHARGE INSTRUCTIONS
Patient Education        Anorectal Abscess Surgery: What to Expect at Home  Your Recovery  Most of the pain that was caused by your abscess will probably go away right after surgery. But you may have some mild pain in your anal area from the incision for several days after the surgery. Most people can go back to work or their normal routine 1 or 2 days after surgery. It will probably take about 2 to 3 weeks for your abscess to completely heal.  Most people get better without any problems. But sometimes a tunnel can form between the old abscess and the outside of the body. This is called a fistula. Your doctor will check for this about 2 to 3 weeks after surgery. If you develop a fistula, the doctor will do surgery to repair the fistula. This care sheet gives you a general idea about how long it will take for you to recover. But each person recovers at a different pace. Follow the steps below to get better as quickly as possible. How can you care for yourself at home? Activity    · Rest when you feel tired. Getting enough sleep will help you recover.     · Try to walk each day. Start by walking a little more than you did the day before. Bit by bit, increase the amount you walk. Walking boosts blood flow and helps prevent pneumonia and constipation.     · Ask your doctor when you can drive again.     · Most people are able to return to work 1 or 2 days after surgery.     · You may shower. Let water run over the abscess area. This will help the abscess heal. Pat your anal area dry with a towel when you are done. Diet    · You can eat your normal diet. If your stomach is upset, try bland, low-fat foods like plain rice, broiled chicken, toast, and yogurt.     · Drink plenty of fluids (unless your doctor tells you not to).   · Eat a low-fiber diet for a couple days or as your doctor suggests. It is best to eat many small meals throughout the day.  Add high-fiber foods a little at a time.     · You may notice that your bowel movements are not regular right after your surgery. This is common. Try to avoid constipation and straining with bowel movements. If you have not had a bowel movement after a couple of days, ask your doctor about taking a mild laxative. Medicines    · Your doctor will tell you if and when you can restart your medicines. He or she will also give you instructions about taking any new medicines.     · If you take aspirin or some other blood thinner, ask your doctor if and when to start taking it again. Make sure that you understand exactly what your doctor wants you to do.     · Take pain medicines exactly as directed. ? If the doctor gave you a prescription medicine for pain, take it as prescribed. ? If you are not taking a prescription pain medicine, ask your doctor if you can take an over-the-counter medicine.     · If your doctor prescribed antibiotics, take them as directed. Do not stop taking them just because you feel better. You need to take the full course of antibiotics.     · If you think your pain medicine is making you sick to your stomach:  ? Take your medicine after meals (unless your doctor has told you not to). ? Ask your doctor for a different pain medicine. Incision care    · Wash your anal area daily with warm, soapy water, and pat it dry. Don't use hydrogen peroxide or alcohol, which can slow healing. You may cover the area with a gauze bandage if it weeps or rubs against clothing. Change the bandage every day.     · After a bowel movement, use a baby wipe or take a shower or sitz bath to gently clean the anal area.     · If your doctor put gauze in your abscess during surgery, follow his or her instructions about when to remove it. Other instructions    · Place a maxi pad or gauze in your underwear to absorb drainage from your abscess while it heals.     · Sit in a few inches of warm water (sitz bath) for 15 to 20 minutes 3 times a day.  Do this as long as you have pain in your anal area.     · Apply ice several times a day for 10 to 20 minutes at a time. Put a thin cloth between the ice and your skin.     · Support your feet with a small step stool when you sit on the toilet. This helps flex your hips and places your pelvis in a squatting position. This can make bowel movements easier after surgery.     · Try lying on your stomach with a pillow under your hips to decrease swelling. Follow-up care is a key part of your treatment and safety. Be sure to make and go to all appointments, and call your doctor if you are having problems. It's also a good idea to know your test results and keep a list of the medicines you take. When should you call for help? Call 911 anytime you think you may need emergency care. For example, call if:    · You passed out (lost consciousness).     · You are short of breath. .   Call your doctor now or seek immediate medical care if:    · You are sick to your stomach and cannot drink fluids.     · You have signs of a blood clot in your leg (called a deep vein thrombosis), such as:  ? Pain in your calf, back of the knee, thigh, or groin. ? Redness and swelling in your leg or groin.     · You have signs of infection, such as:  ? Increased pain, swelling, warmth, or redness. ? Red streaks leading from the incision. ? Pus draining from the incision. ? A fever.     · You cannot pass stools or gas.     · Bright red blood has soaked through the bandage over your incision.     · You have pain that does not get better after you take pain medicine. Watch closely for any changes in your health, and be sure to contact your doctor if you have any problems. Where can you learn more? Go to http://hyun-perla.info/  Enter H717 in the search box to learn more about \"Anorectal Abscess Surgery: What to Expect at Home. \"  Current as of: April 15, 2020               Content Version: 12.6  © 9230-2526 [employer].    Care instructions adapted under license by [employer] (which disclaims liability or warranty for this information). If you have questions about a medical condition or this instruction, always ask your healthcare professional. [address] any warranty or liability for your use of this information.

## 2020-09-09 NOTE — ED NOTES
Discharge instructions provided to pt. Pt verbalized understanding. Pt ambulatory out of ED with VSS and no signs of distress.

## 2020-09-09 NOTE — ED TRIAGE NOTES
Pt c/o RIGHT anterior rib pain. Pt reports the area is tender to palpation. Pt states, \"I think something is broke. \"  Denies known injury. Pt reports he would also like to be seen for a possible abscess on his buttocks.

## 2020-09-12 NOTE — ED PROVIDER NOTES
70-year-old male past medical history of diverticulosis, DM 2, dyslipidemia, hemorrhoids, low back pain, obesity presents to ED due to point left pain on his ribs and he suspects an abscess of his buttock. Patient notes intermittent right-sided upper chest pain with certain movements for the past 3 weeks. Patient notes he first had the pain while doing push-ups, is exacerbated when pulling his arm back, is not associated with lightheaded, dizziness, nausea. Denies any history of CAD, procedures of his heart, MI. Patient notes that the pain is not present at rest.  He noticed a sore bump the crease of his buttocks on the left side for the past 3 days which has been worsening. Pain is worse with sitting pushing on the area. Patient denies any drainage, redness. Denies pain with bowel movements or loss of bowel function. Fever, chills. Past Medical History:   Diagnosis Date    Abnormal EKG 05/17/2019    Diverticulosis 06/04/2019    DM2 (diabetes mellitus, type 2) ([facility] [address] 05/17/2019    Dyslipidemia     Grade I diastolic dysfunction 39/26/8273    echo    Hemorrhoids 06/04/2019    Low back pain     Obesity (BMI 30.0-34. 9)     Obesity (BMI 30.0-34. 9)     Preventative health care     S/P colonoscopy 06/28/2019    md viry -10 yrs -hemorrhoids,tics       Past Surgical History:   Procedure Laterality Date    COLONOSCOPY N/A 6/28/2019    COLONOSCOPY performed by Srinivas Gutiérrez MD at [facility] ENDOSCOPY         Family History:   Problem Relation Age of Onset    Diabetes Mother        Social History     Socioeconomic History    Marital status:      Spouse name: Not on file    Number of children: Not on file    Years of education: Not on file    Highest education level: Not on file   Occupational History    Not on file   Social Needs    Financial resource strain: Not on file    Food insecurity     Worry: Not on file     Inability: Not on file    Transportation needs     Medical: Not on file     Non-medical: Not on file   Tobacco Use    Smoking status: Never Smoker    Smokeless tobacco: Never Used   Substance and Sexual Activity    Alcohol use: Never     Frequency: Never    Drug use: Never    Sexual activity: Yes     Partners: Female     Birth control/protection: None   Lifestyle    Physical activity     Days per week: Not on file     Minutes per session: Not on file    Stress: Not on file   Relationships    Social connections     Talks on phone: Not on file     Gets together: Not on file     Attends Mandaen service: Not on file     Active member of club or organization: Not on file     Attends meetings of clubs or organizations: Not on file     Relationship status: Not on file    Intimate partner violence     Fear of current or ex partner: Not on file     Emotionally abused: Not on file     Physically abused: Not on file     Forced sexual activity: Not on file   Other Topics Concern    Not on file   Social History Narrative    Habits:  He is a lifetime nonsmoker, non drinker, non drug abuser.         Social History:  The patient is , lives with his wife. He was born in [place]. He has been in the [address] [place] [address],[address] since 2005. They have three children, two daughters ages 9 and 11, and a son age 3. He completed his ALIA at [employer]. He was in [employer] for five years. 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 Christianity background is Protestant.         Family History:  Father 76, alive and well. Mother 67 with diabetes. Six brothers and four sisters are alive and well. ALLERGIES: Patient has no known allergies. Review of Systems   Constitutional: Negative for appetite change and fever. HENT: Negative for congestion and sore throat. Respiratory: Negative for cough and shortness of breath. Cardiovascular: Positive for chest pain (right upper exacerbated with arm movement). Negative for palpitations.    Gastrointestinal: Negative for abdominal pain, nausea and vomiting. Genitourinary: Negative for dysuria. Musculoskeletal: Negative for back pain and myalgias. Skin: Negative for rash. Tender lump in gluteal cleft on left side   Neurological: Negative for dizziness and weakness. Vitals:    09/09/20 1543 09/09/20 1826   BP: (!) 151/93 (!) 134/93   Pulse: 65 62   Resp: 16 16   Temp: 97.4 °F (36.3 °C) 97.9 °F (36.6 °C)   SpO2: 98% 99%            Physical Exam  Vitals signs and nursing note reviewed. Constitutional:       General: He is not in acute distress. HENT:      Head: Normocephalic. Nose: Nose normal.      Mouth/Throat:      Mouth: Mucous membranes are moist.   Eyes:      Extraocular Movements: Extraocular movements intact. Neck:      Musculoskeletal: Normal range of motion. Cardiovascular:      Rate and Rhythm: Normal rate and regular rhythm. Heart sounds: Normal heart sounds. Pulmonary:      Effort: Pulmonary effort is normal. No respiratory distress. Breath sounds: Normal breath sounds. No decreased breath sounds. Chest:      Chest wall: Tenderness present. No lacerations, deformity or crepitus. Abdominal:      General: Abdomen is protuberant. Bowel sounds are normal.      Tenderness: There is no abdominal tenderness. Musculoskeletal:      Right lower leg: No edema. Left lower leg: No edema. Skin:     General: Skin is dry. Findings: No rash. Neurological:      Mental Status: He is alert and oriented to person, place, and time.    Psychiatric:         Mood and Affect: Mood normal.        Medications   lidocaine-EPINEPHrine (XYLOCAINE) 1 %-1:100,000 injection 15 mg (15 mg IntraDERMal Given by Provider 9/9/20 2482)     Labs Reviewed - No data to display  XR CHEST PA LAT   Final Result   Impression: No acute process or change compared to the prior exam.           Discharge Medication List as of 9/9/2020  6:17 PM      START taking these medications    Details   naproxen (Naprosyn) 500 mg tablet Take 1 Tab by mouth two (2) times daily (with meals) for 10 days. , Normal, Disp-20 Tab,R-0      bacitracin (BACITRACIN) 500 unit/gram oint Apply  to affected area three (3) times daily for 10 days. Apply to affected area, Normal, Disp-1 Tube,R-0         CONTINUE these medications which have NOT CHANGED    Details   atorvastatin (LIPITOR) 10 mg tablet Take 1 Tab by mouth daily. , Normal, Disp-30 Tab,R-11      metFORMIN (GLUCOPHAGE) 500 mg tablet Take 1 tablet daily with dinner, Normal, Disp-90 Tab,R-3      albuterol (PROVENTIL HFA, VENTOLIN HFA, PROAIR HFA) 90 mcg/actuation inhaler Take 2 Puffs by inhalation every four (4) hours as needed for Wheezing., Normal, Disp-1 Inhaler, R-11      acetaminophen (TYLENOL) 500 mg tablet Take 2 Tabs by mouth every six (6) hours as needed for Pain or Fever., Normal, Disp-30 Tab, R-0      ibuprofen (MOTRIN) 600 mg tablet Take 1 Tab by mouth every six (6) hours as needed for Pain (fever). , Normal, Disp-20 Tab, R-0      clotrimazole-betamethasone (LOTRISONE) topical cream Apply  to affected area two (2) times a day., Normal, Disp-45 g, R-11      Blood-Glucose Meter (ONETOUCH VERIO FLEX) misc Used to test blood sugars 3 times daily, Normal, Disp-1 Each, R-0      glucose blood VI test strips (ONETOUCH VERIO) strip Used to test blood sugar 3 times daily. , Normal, Disp-100 Strip, R-11               MDM  Number of Diagnoses or Management Options  Costochondritis:   Perianal abscess:      Amount and/or Complexity of Data Reviewed  Tests in the radiology section of CPT®: reviewed      Differential diagnosis includes ACS, muscular strain, costochondritis, pneumonia, perianal abscess, compromise of anal musculature    Examination of the chest is consistent with musculoskeletal pain, no pain with exertion at rest, and is reproducible with pressure and movement of the right arm, chest x-ray reveals no acute process    Left perianal abscess without involvement of anal musculature, no associated cellulitis, simple I&D performed with scant bloody drainage    Return precautions such as chest pain with exertion, lightheadedness, shortness of breath, worsening perianal abscess with purulent drainage, fever, expanding redness    No cellulitis at this time so antibiotics are not indicated    reviewed with attending physician, Dr. Tara Bah         I&D Abcess Simple    Date/Time: 9/11/2020 8:14 PM  Performed by: Fahad Basurto PA-C  Authorized by: Fahad Basurto PA-C     Consent:     Consent obtained:  Verbal    Consent given by:  Patient    Risks discussed:  Bleeding, pain and infection    Alternatives discussed:  No treatment  Location:     Type:  Abscess    Size:  2cm x 2cm    Location:  Anogenital    Anogenital location:  Perianal  Pre-procedure details:     Skin preparation:  Betadine  Anesthesia (see MAR for exact dosages):      Anesthesia method:  Local infiltration    Local anesthetic:  Lidocaine 1% w/o epi  Procedure type:     Complexity:  Simple  Procedure details:     Needle aspiration: no      Incision types:  Stab incision    Incision depth:  Dermal    Scalpel blade:  11    Wound management:  Probed and deloculated and irrigated with saline    Drainage:  Bloody    Drainage amount:  Scant    Wound treatment:  Wound left open    Packing materials:  None

## 2020-09-25 ENCOUNTER — OFFICE VISIT (OUTPATIENT)
Dept: INTERNAL MEDICINE CLINIC | Age: 50
End: 2020-09-25
Payer: MEDICAID

## 2020-09-25 DIAGNOSIS — E66.9 OBESITY (BMI 30.0-34.9): ICD-10-CM

## 2020-09-25 DIAGNOSIS — E78.5 DYSLIPIDEMIA: ICD-10-CM

## 2020-09-25 DIAGNOSIS — H93.13 TINNITUS OF BOTH EARS: ICD-10-CM

## 2020-09-25 DIAGNOSIS — K64.0 GRADE I HEMORRHOIDS: ICD-10-CM

## 2020-09-25 DIAGNOSIS — I51.89 GRADE I DIASTOLIC DYSFUNCTION: ICD-10-CM

## 2020-09-25 DIAGNOSIS — H91.93 BILATERAL HEARING LOSS, UNSPECIFIED HEARING LOSS TYPE: ICD-10-CM

## 2020-09-25 DIAGNOSIS — E11.9 TYPE 2 DIABETES MELLITUS WITHOUT COMPLICATION, WITHOUT LONG-TERM CURRENT USE OF INSULIN (HCC): Primary | ICD-10-CM

## 2020-09-25 PROBLEM — H91.90 HEARING DEFICIT: Status: ACTIVE | Noted: 2020-09-25

## 2020-09-25 PROCEDURE — 36415 COLL VENOUS BLD VENIPUNCTURE: CPT | Performed by: INTERNAL MEDICINE

## 2020-09-25 PROCEDURE — 99214 OFFICE O/P EST MOD 30 MIN: CPT | Performed by: INTERNAL MEDICINE

## 2020-09-25 NOTE — PROGRESS NOTES
SPORTS MEDICINE AND PRIMARY CARE  Caitlyn Marte MD, [address] [address]  Phone:  [phone]  Fax: [phone]       Chief Complaint   Patient presents with    Diabetes   . SUBJECTIVE:    Honorio Baldwin is a 48 y.o. male Patient returns today with known history of obesity, dyslipidemia, hemorrhoids, diastolic dysfunction, type 2 diabetes and hearing deficit. He was seen in the emergency room on 09/09 by Dr. Magalie Michaud for a left perianal abscess without involvement of the    suture, no associated cellulitis and a simple I&D was performed with scant, bloody drainage, and it was treated appropriately. Patient is seen for evaluation. Patient complains of a muscle type pain in the substernal area. When he pushes on his chest he notes a click. It is not associated with shortness of breath or diaphoresis, it is not activity related. There is no radiation of the discomfort. He was seen in the ER and evaluation was unremarkable for cardiac origin of the chest discomfort. Lastly, he woke up and could not hear well and then it went away, but now is back again and is about the same thing. Patient is seen for evaluation. Current Outpatient Medications   Medication Sig Dispense Refill    atorvastatin (LIPITOR) 10 mg tablet Take 1 Tab by mouth daily. 30 Tab 11    metFORMIN (GLUCOPHAGE) 500 mg tablet Take 1 tablet daily with dinner 90 Tab 3    albuterol (PROVENTIL HFA, VENTOLIN HFA, PROAIR HFA) 90 mcg/actuation inhaler Take 2 Puffs by inhalation every four (4) hours as needed for Wheezing. 1 Inhaler 11    acetaminophen (TYLENOL) 500 mg tablet Take 2 Tabs by mouth every six (6) hours as needed for Pain or Fever. 30 Tab 0    ibuprofen (MOTRIN) 600 mg tablet Take 1 Tab by mouth every six (6) hours as needed for Pain (fever). 20 Tab 0    clotrimazole-betamethasone (LOTRISONE) topical cream Apply  to affected area two (2) times a day.  45 g 11    Blood-Glucose Meter (ONETOUCH VERIO FLEX) misc Used to test blood sugars 3 times daily 1 Each 0    glucose blood VI test strips (ONETOUCH VERIO) strip Used to test blood sugar 3 times daily. 100 Strip 11     Past Medical History:   Diagnosis Date    Abnormal EKG 05/17/2019    Diverticulosis 06/04/2019    DM2 (diabetes mellitus, type 2) ([facility] 05/17/2019    Dyslipidemia     Grade I diastolic dysfunction 01/10/4817    echo    Hemorrhoids 06/04/2019    Low back pain     Obesity (BMI 30.0-34. 9)     Obesity (BMI 30.0-34. 9)     Preventative health care     S/P colonoscopy 06/28/2019    md viry -10 yrs -hemorrhoids,tics    Tinnitus of both ears      Past Surgical History:   Procedure Laterality Date    COLONOSCOPY N/A 6/28/2019    COLONOSCOPY performed by Jacqui Gilford, MD at [facility] ENDOSCOPY     No Known Allergies      REVIEW OF SYSTEMS:  General: negative for - chills or fever  ENT: negative for - headaches, nasal congestion or tinnitus  Respiratory: negative for - cough, hemoptysis, shortness of breath or wheezing  Cardiovascular : negative for - chest pain, edema, palpitations or shortness of breath  Gastrointestinal: negative for - abdominal pain, blood in stools, heartburn or nausea/vomiting  Genito-Urinary: no dysuria, trouble voiding, or hematuria  Musculoskeletal: negative for - gait disturbance, joint pain, joint stiffness or joint swelling  Neurological: no TIA or stroke symptoms  Hematologic: no bruises, no bleeding, no swollen glands  Integument: no lumps, mole changes, nail changes or rash  Endocrine: no malaise/lethargy or unexpected weight changes      Social History     Socioeconomic History    Marital status:      Spouse name: Not on file    Number of children: Not on file    Years of education: Not on file    Highest education level: Not on file   Tobacco Use    Smoking status: Never Smoker    Smokeless tobacco: Never Used   Substance and Sexual Activity    Alcohol use: Never     Frequency: Never    Drug use: Never    Sexual activity: Yes     Partners: Female     Birth control/protection: None   Social History Narrative    Habits:  He is a lifetime nonsmoker, non drinker, non drug abuser.         Social History:  The patient is , lives with his wife. He was born in [place]. He has been in the [address] [place] [address],[address] since 2005. They have three children, two daughters ages 9 and 11, and a son age 3. He completed his ALIA at [employer]. He was in [employer] [facility] for five years. 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         Family History:  Father 76, alive and well. Mother 67 with diabetes. Six brothers and four sisters are alive and well. Family History   Problem Relation Age of Onset    Diabetes Mother        OBJECTIVE:    There were no vitals taken for this visit. CONSTITUTIONAL: well , well nourished, appears age appropriate  EYES: perrla, eom intact  ENMT:moist mucous membranes, pharynx clear  NECK: supple. Thyroid normal  RESPIRATORY: Chest: clear bilaterally   CARDIOVASCULAR: Heart: regular rate and rhythm  GASTROINTESTINAL: Abdomen: soft, bowel sounds active  HEMATOLOGIC: no pathological lymph nodes palpated  MUSCULOSKELETAL: Extremities: no edema, pulse 1+   INTEGUMENT: No unusual rashes or suspicious skin lesions noted. Nails appear normal.  NEUROLOGIC: non-focal exam   MENTAL STATUS: alert and oriented, appropriate affect           ASSESSMENT:  1. Type 2 diabetes mellitus without complication, without long-term current use of insulin ([address]    2. Grade I diastolic dysfunction    3. Grade I hemorrhoids    4. Dyslipidemia    5. Obesity (BMI 30.0-34.9)    6. Bilateral hearing loss, unspecified hearing loss type    7. Tinnitus of both ears      We will assess blood sugar control with hemoglobin A1c and report to him the results. He is on Metformin 500 mg with supper. He has grade 1 diastolic dysfunction, for which he is asymptomatic.     The issue of the perianal abscess is resolved. For his dyslipidemia he is on Lipitor 10 mg daily with good results. His weight remains elevated. He has a hearing deficit episodically and also noise in his ears, which suggests tinnitus, and that combination together would raise the question of Meniere's disease. For that reason we will ask him to see ENT doctor to see if there is any other pathology we need to be concerned about. His external canals are completely clear and his TMs are clear. He will be back to see us in three to four months. I have discussed the diagnosis with the patient and the intended plan as seen in the  Orders. The patient understands and agees with the plan. The patient has   received an after visit summary and questions were answered concerning  future plans  Patient labs and/or xrays were reviewed  Past records were reviewed. PLAN:  .  Orders Placed This Encounter    CBC WITH AUTOMATED DIFF    HEMOGLOBIN A1C WITH EAG    REFERRAL TO ENT-OTOLARYNGOLOGY       Follow-up and Dispositions    · Return in about 4 months (around 1/25/2021). ATTENTION:   This medical record was transcribed using an electronic medical records system. Although proofread, it may and can contain electronic and spelling errors. Other human spelling and other errors may be present. Corrections may be executed at a later time. Please feel free to contact us for any clarifications as needed.

## 2020-09-25 NOTE — PROGRESS NOTES
1. Have you been to the ER, urgent care clinic since your last visit? Hospitalized since your last visit? Yes When: 9-9-2020 Where: [facility] Reason for visit: chest pain    2. Have you seen or consulted any other health care providers outside of the [address] [place] [address] since your last visit? Include any pap smears or colon screening.  No     Patient state that he cant hear out of his ears ED follow up

## 2020-09-26 LAB
BASOPHILS # BLD AUTO: 0.1 X10E3/UL (ref 0–0.2)
BASOPHILS NFR BLD AUTO: 1 %
EOSINOPHIL # BLD AUTO: 0 X10E3/UL (ref 0–0.4)
EOSINOPHIL NFR BLD AUTO: 1 %
ERYTHROCYTE [DISTWIDTH] IN BLOOD BY AUTOMATED COUNT: 13.4 % (ref 11.6–15.4)
EST. AVERAGE GLUCOSE BLD GHB EST-MCNC: 140 MG/DL
HBA1C MFR BLD: 6.5 % (ref 4.8–5.6)
HCT VFR BLD AUTO: 46.9 % (ref 37.5–51)
HGB BLD-MCNC: 15.9 G/DL (ref 13–17.7)
IMM GRANULOCYTES # BLD AUTO: 0 X10E3/UL (ref 0–0.1)
IMM GRANULOCYTES NFR BLD AUTO: 0 %
LYMPHOCYTES # BLD AUTO: 2.4 X10E3/UL (ref 0.7–3.1)
LYMPHOCYTES NFR BLD AUTO: 49 %
MCH RBC QN AUTO: 27.7 PG (ref 26.6–33)
MCHC RBC AUTO-ENTMCNC: 33.9 G/DL (ref 31.5–35.7)
MCV RBC AUTO: 82 FL (ref 79–97)
MONOCYTES # BLD AUTO: 0.4 X10E3/UL (ref 0.1–0.9)
MONOCYTES NFR BLD AUTO: 8 %
NEUTROPHILS # BLD AUTO: 2 X10E3/UL (ref 1.4–7)
NEUTROPHILS NFR BLD AUTO: 41 %
PLATELET # BLD AUTO: 290 X10E3/UL (ref 150–450)
RBC # BLD AUTO: 5.74 X10E6/UL (ref 4.14–5.8)
WBC # BLD AUTO: 5 X10E3/UL (ref 3.4–10.8)

## 2020-10-03 ENCOUNTER — HOSPITAL ENCOUNTER (EMERGENCY)
Age: 50
Discharge: HOME | End: 2020-10-03
Attending: STUDENT IN AN ORGANIZED HEALTH CARE EDUCATION/TRAINING PROGRAM
Payer: MEDICAID

## 2020-10-03 VITALS
DIASTOLIC BLOOD PRESSURE: 98 MMHG | HEART RATE: 62 BPM | WEIGHT: 248 LBS | HEIGHT: 74 IN | SYSTOLIC BLOOD PRESSURE: 157 MMHG | RESPIRATION RATE: 16 BRPM | TEMPERATURE: 97 F | OXYGEN SATURATION: 98 % | BODY MASS INDEX: 31.83 KG/M2

## 2020-10-03 DIAGNOSIS — R09.81 NASAL CONGESTION: Primary | ICD-10-CM

## 2020-10-03 DIAGNOSIS — M54.2 NECK PAIN, MUSCULOSKELETAL: ICD-10-CM

## 2020-10-03 PROCEDURE — 99282 EMERGENCY DEPT VISIT SF MDM: CPT

## 2020-10-03 RX ORDER — LIDOCAINE 50 MG/G
PATCH TOPICAL
Qty: 5 EACH | Refills: 0 | Status: SHIPPED | OUTPATIENT
Start: 2020-10-03 | End: 2022-04-04

## 2020-10-03 RX ORDER — FLUTICASONE PROPIONATE 50 MCG
2 SPRAY, SUSPENSION (ML) NASAL DAILY
Qty: 1 BOTTLE | Refills: 0 | Status: SHIPPED | OUTPATIENT
Start: 2020-10-03 | End: 2020-10-10

## 2020-10-03 RX ORDER — IBUPROFEN 800 MG/1
800 TABLET ORAL
Qty: 20 TAB | Refills: 0 | Status: SHIPPED | OUTPATIENT
Start: 2020-10-03 | End: 2020-10-10

## 2020-10-03 RX ORDER — CETIRIZINE HCL 10 MG
10 TABLET ORAL DAILY
Qty: 7 TAB | Refills: 0 | Status: SHIPPED | OUTPATIENT
Start: 2020-10-03 | End: 2020-10-10

## 2020-10-03 NOTE — DISCHARGE INSTRUCTIONS
Use Flonase nasal spray and Zyrtec for relief of nasal congestion and possible upper respiratory infection    Use ibuprofen and Lidoderm patch as needed for neck pain, see attached instructions for cervical exercises. Can apply heat and ice as needed for pain.     Follow-up with your primary care physician    Return to emergency department for any chest pain, shortness of breath, worsening symptoms

## 2020-10-03 NOTE — ED PROVIDER NOTES
Patient is a 15-year-old male with history of type 2 diabetes, dyslipidemia, grade 1 diastolic dysfunction, tinnitus presented emergency department for evaluation of bilateral neck pain with radiation to bilateral ears, sore throat, and nasal congestion with onset yesterday. Patient reports that he felt \"feverish\", took his temperature which was 97 °F, took Tylenol and went to sleep. He did not take any Tylenol or Motrin this morning prior to arrival.  He also was seen by Dr. Jasmin Reyes with ENT 2 days ago for ongoing decreased hearing and tinnitus and was told that everything was \"normal\". Patient denies chills, sweats, headache, neck rigidity, cough, chest pain, shortness of breath, abdominal pain, nausea, vomiting, diarrhea, or any other medical complaints at this time. He denies any known sick contacts. Past Medical History:   Diagnosis Date    Abnormal EKG 05/17/2019    Diverticulosis 06/04/2019    DM2 [facility]di[facility] mellitus[facility] type [facility]) [facility] 05/17/2019    Dyslipidemia     Grade I diastolic dysfunction 03/34/0000    echo    Hemorrhoids 06/04/2019    Low back pain     Obesity (BMI 30.0-34. 9)     Obesity (BMI 30.0-34. 9)     Preventative health care     S/P colonoscopy 06/28/2019    md viry -10 yrs -hemorrhoids,tics    Tinnitus of both ears        Past Surgical History:   Procedure Laterality Date    COLONOSCOPY N/A 6/28/2019    COLONOSCOPY performed by Maikel Newberry MD at [facility] ENDOSCOPY         Family History:   Problem Relation Age of Onset    Diabetes Mother        Social History     Socioeconomic History    Marital status:      Spouse name: Not on file    Number of children: Not on file    Years of education: Not on file    Highest education level: Not on file   Occupational History    Not on file   Social Needs    Financial resource strain: Not on file    Food insecurity     Worry: Not on file     Inability: Not on file    Transportation needs     Medical: Not on file Non-medical: Not on file   Tobacco Use    Smoking status: Never Smoker    Smokeless tobacco: Never Used   Substance and Sexual Activity    Alcohol use: Never     Frequency: Never    Drug use: Never    Sexual activity: Yes     Partners: Female     Birth control/protection: None   Lifestyle    Physical activity     Days per week: Not on file     Minutes per session: Not on file    Stress: Not on file   Relationships    Social connections     Talks on phone: Not on file     Gets together: Not on file     Attends Anglican service: Not on file     Active member of club or organization: Not on file     Attends meetings of clubs or organizations: Not on file     Relationship status: Not on file    Intimate partner violence     Fear of current or ex partner: Not on file     Emotionally abused: Not on file     Physically abused: Not on file     Forced sexual activity: Not on file   Other Topics Concern    Not on file   Social History Narrative    Habits:  He is a lifetime nonsmoker, non drinker, non drug abuser.         Social History:  The patient is , lives with his wife. He was born in [place]. He has been in the [address] [place] [address],[address] since 2005. They have three children, two daughters ages 9 and 11, and a son age 3. He completed his ALIA at [employer]. He was in [employer] for five years. 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 [employer]ism background is [employer].         Family History:  Father 76, alive and well. Mother 67 with diabetes. Six brothers and four sisters are alive and well. ALLERGIES: Patient has no known allergies. Review of Systems   Constitutional: Negative for chills and fever. HENT: Positive for congestion, ear pain (Bilateral) and sore throat. Negative for rhinorrhea, sinus pressure, sinus pain and trouble swallowing. Eyes: Negative for visual disturbance. Respiratory: Negative for cough and shortness of breath. Cardiovascular: Negative for chest pain. Gastrointestinal: Negative for abdominal pain, diarrhea, nausea and vomiting. Genitourinary: Negative for flank pain. Musculoskeletal: Positive for neck pain. Negative for back pain and neck stiffness. Skin: Negative for color change. Neurological: Negative for dizziness and headaches. Psychiatric/Behavioral: Negative for confusion. Vitals:    10/03/20 0806   BP: (!) 157/98   Pulse: 62   Resp: 16   Temp: 97 °F (36.1 °C)   SpO2: 98%   Weight: 112.5 kg (248 lb)   Height: 6' 2\" (1.88 m)            Physical Exam  Vitals signs and nursing note reviewed. Constitutional:       General: He is not in acute distress. Appearance: Normal appearance. He is not ill-appearing. HENT:      Head: Normocephalic and atraumatic. Right Ear: Tympanic membrane, ear canal and external ear normal. Tympanic membrane is not erythematous or bulging. Left Ear: Tympanic membrane, ear canal and external ear normal. Tympanic membrane is not erythematous or bulging. Nose: Congestion present. No rhinorrhea. Right Sinus: No maxillary sinus tenderness or frontal sinus tenderness. Left Sinus: No maxillary sinus tenderness or frontal sinus tenderness. Mouth/Throat:      Mouth: Mucous membranes are moist.      Pharynx: Oropharynx is clear. Uvula midline. No pharyngeal swelling, oropharyngeal exudate, posterior oropharyngeal erythema or uvula swelling. Tonsils: No tonsillar exudate or tonsillar abscesses. 0 on the right. 0 on the left. Eyes:      General:         Right eye: No discharge. Left eye: No discharge. Extraocular Movements: Extraocular movements intact. Conjunctiva/sclera: Conjunctivae normal.   Neck:      Musculoskeletal: No neck rigidity. Cardiovascular:      Rate and Rhythm: Normal rate and regular rhythm. Heart sounds: Normal heart sounds. Pulmonary:      Effort: Pulmonary effort is normal. No respiratory distress.       Breath sounds: Normal breath sounds. No wheezing or rales. Abdominal:      General: There is no distension. Palpations: Abdomen is soft. Tenderness: There is no abdominal tenderness. There is no guarding. Musculoskeletal: Normal range of motion. Cervical back: He exhibits tenderness (bilateral muscular) and pain. He exhibits normal range of motion, no bony tenderness, no swelling and normal pulse. Thoracic back: Normal.      Lumbar back: Normal.      Right lower leg: No edema. Left lower leg: No edema. Comments: Tenderness palpation in bilateral neck muscular regions. Full range of movement. No midline tenderness, crepitus, step-offs, or deformities. Lower extremity strength intact and equal bilaterally. Sensation intact. Upper extremity pulses intact and equal bilaterally. Lymphadenopathy:      Cervical: No cervical adenopathy. Skin:     General: Skin is warm and dry. Neurological:      General: No focal deficit present. Mental Status: He is alert and oriented to person, place, and time. Psychiatric:         Mood and Affect: Mood normal.          MDM  Number of Diagnoses or Management Options  Nasal congestion:   Neck pain, musculoskeletal:   Diagnosis management comments: Patient is alert, appearing, afebrile, vitals stable. Onset of nasal congestion, sore throat, muscular neck pain, ear pain yesterday. No clinical signs of otitis media, pharyngitis, peritonsillar abscess, or other bacterial infection at this time. No lower respiratory symptoms, lungs are clear to auscultation bilaterally. No neck rigidity, midline tenderness,  Neurovascular or motor deficits to neck or upper extremities. Sign symptoms consistent with muscular neck strain/spasm and viral upper respiratory infection versus seasonal allergies. Patient defers COVID-19 testing today. Patient to be discharged home with symptomatic treatment with Flonase, Zyrtec, NSAIDS, and lidoderm patch.  Strict return precautions outlined to return to emergency department with intractable fever, cough, chest pain, shortness of breath. 8:36 AM  Pt has been reevaluated. There are no new complaints, changes, or physical findings at this time. Medications have been reviewed w/ pt and/or family. Pt and/or family's questions have been answered. Pt and/or family expressed good understanding of the dx/tx/rx and is in agreement with plan of care. Pt instructed and agreed to f/u w/ PCP and to return to ED upon further deterioration. Pt is ready for discharge. IMPRESSION:  1. Nasal congestion    2. Neck pain, musculoskeletal        PLAN:  1. Discharge Medication List as of 10/3/2020  8:28 AM      START taking these medications    Details   lidocaine (Lidoderm) 5 % Apply patch to the affected area for 12 hours a day and remove for 12 hours a day., Print, Disp-5 Each,R-0      !! ibuprofen (MOTRIN) 800 mg tablet Take 1 Tab by mouth every six (6) hours as needed for Pain for up to 7 days. Indications: pain, Print, Disp-20 Tab,R-0      fluticasone propionate (FLONASE) 50 mcg/actuation nasal spray 2 Sprays by Both Nostrils route daily for 7 days. , Print, Disp-1 Bottle,R-0      cetirizine (ZyrTEC) 10 mg tablet Take 1 Tab by mouth daily for 7 days. Indications: inflammation of the nose due to an allergy, Print, Disp-7 Tab,R-0       !! - Potential duplicate medications found. Please discuss with provider. CONTINUE these medications which have NOT CHANGED    Details   atorvastatin (LIPITOR) 10 mg tablet Take 1 Tab by mouth daily. , Normal, Disp-30 Tab,R-11      metFORMIN (GLUCOPHAGE) 500 mg tablet Take 1 tablet daily with dinner, Normal, Disp-90 Tab,R-3      albuterol (PROVENTIL HFA, VENTOLIN HFA, PROAIR HFA) 90 mcg/actuation inhaler Take 2 Puffs by inhalation every four (4) hours as needed for Wheezing., Normal, Disp-1 Inhaler, R-11      acetaminophen (TYLENOL) 500 mg tablet Take 2 Tabs by mouth every six (6) hours as needed for Pain or Fever. , Normal, Disp-30 Tab, R-0      !! ibuprofen (MOTRIN) 600 mg tablet Take 1 Tab by mouth every six (6) hours as needed for Pain (fever). , Normal, Disp-20 Tab, R-0      clotrimazole-betamethasone (LOTRISONE) topical cream Apply  to affected area two (2) times a day., Normal, Disp-45 g, R-11      Blood-Glucose Meter (ONETOUCH VERIO FLEX) misc Used to test blood sugars 3 times daily, Normal, Disp-1 Each, R-0      glucose blood VI test strips (ONETOUCH VERIO) strip Used to test blood sugar 3 times daily. , Normal, Disp-100 Strip, R-11       !! - Potential duplicate medications found. Please discuss with provider.         2.   Follow-up Information     Follow up With Specialties Details Why Contact Info    Isaac Ordaz MD Internal Medicine Schedule an appointment as soon as possible for a visit in 3 days As needed [facility]  [address] [facility] [phone]      [address] [facility] [address] [facility] Emergency Medicine Go to  If symptoms worsen [address] [place] [address]  [phone]            Return to ED if worse            Procedures

## 2020-11-06 ENCOUNTER — OFFICE VISIT (OUTPATIENT)
Dept: INTERNAL MEDICINE CLINIC | Age: 50
End: 2020-11-06
Payer: MEDICAID

## 2020-11-06 VITALS
TEMPERATURE: 97.9 F | RESPIRATION RATE: 16 BRPM | WEIGHT: 252.8 LBS | OXYGEN SATURATION: 96 % | DIASTOLIC BLOOD PRESSURE: 107 MMHG | HEIGHT: 74 IN | BODY MASS INDEX: 32.44 KG/M2 | SYSTOLIC BLOOD PRESSURE: 142 MMHG | HEART RATE: 79 BPM

## 2020-11-06 DIAGNOSIS — E78.5 DYSLIPIDEMIA: ICD-10-CM

## 2020-11-06 DIAGNOSIS — R51.9 NONINTRACTABLE EPISODIC HEADACHE, UNSPECIFIED HEADACHE TYPE: ICD-10-CM

## 2020-11-06 DIAGNOSIS — I10 ESSENTIAL HYPERTENSION: Primary | ICD-10-CM

## 2020-11-06 DIAGNOSIS — E66.9 OBESITY (BMI 30.0-34.9): ICD-10-CM

## 2020-11-06 DIAGNOSIS — I51.89 GRADE I DIASTOLIC DYSFUNCTION: ICD-10-CM

## 2020-11-06 DIAGNOSIS — E11.9 TYPE 2 DIABETES MELLITUS WITHOUT COMPLICATION, WITHOUT LONG-TERM CURRENT USE OF INSULIN (HCC): ICD-10-CM

## 2020-11-06 PROCEDURE — 99213 OFFICE O/P EST LOW 20 MIN: CPT | Performed by: INTERNAL MEDICINE

## 2020-11-06 RX ORDER — TELMISARTAN 80 MG/1
80 TABLET ORAL DAILY
Qty: 30 TAB | Refills: 11 | Status: SHIPPED | OUTPATIENT
Start: 2020-11-06 | End: 2020-11-20 | Stop reason: SINTOL

## 2020-11-06 NOTE — PROGRESS NOTES
Chief Complaint   Patient presents with    Blood Pressure Check     Patient is here for blood pressure problem. 1. Have you been to the ER, urgent care clinic since your last visit? Hospitalized since your last visit? No    2. Have you seen or consulted any other health care providers outside of the [address] [place] [address] since your last visit? Include any pap smears or colon screening.  No

## 2020-11-06 NOTE — PROGRESS NOTES
SPORTS MEDICINE AND PRIMARY CARE  Bhaskar Quach MD, [address] [address]  Phone:  [phone]  Fax: [phone]       Chief Complaint   Patient presents with    Blood Pressure Check     Patient is here for blood pressure problem. .      SUBJECTIVE:    Johann Schulz is a 48 y.o. male Patient returns today after a visit to the ER on 10/03/20, where he complained of a sore throat, bilateral ear discomfort, neck pain radiating to the ears, nasal congestion and felt feverish. He was evaluated by the ER physician and was found to have nasal congestion and musculoskeletal neck pain and sent home with Lidoderm and ibuprofen and Flonase and Zyrtec. Two or three days ago patient noted the onset of pain on the right frontal area with radiation to the occipital area and to the trapezius musculature on the right. It felt like a ping-headache type pain. It was not associated with visual disturbances and no nausea or vomiting. It lasted for about a half an hour. The third time he had the episode he went to the pharmacy and checked his BP and it was noted to be elevated. He states it feels like it is locked on that side and he is seen for evaluation. Other medical problems include obesity, type 2 diabetes, grade 1 diastolic dysfunction. Current Outpatient Medications   Medication Sig Dispense Refill    telmisartan (MICARDIS) 80 mg tablet Take 1 Tab by mouth daily. 30 Tab 11    lidocaine (Lidoderm) 5 % Apply patch to the affected area for 12 hours a day and remove for 12 hours a day. 5 Each 0    atorvastatin (LIPITOR) 10 mg tablet Take 1 Tab by mouth daily. 30 Tab 11    metFORMIN (GLUCOPHAGE) 500 mg tablet Take 1 tablet daily with dinner 90 Tab 3    albuterol (PROVENTIL HFA, VENTOLIN HFA, PROAIR HFA) 90 mcg/actuation inhaler Take 2 Puffs by inhalation every four (4) hours as needed for Wheezing.  1 Inhaler 11    acetaminophen (TYLENOL) 500 mg tablet Take 2 Tabs by mouth every six (6) hours as needed for Pain or Fever. 30 Tab 0    ibuprofen (MOTRIN) 600 mg tablet Take 1 Tab by mouth every six (6) hours as needed for Pain (fever). 20 Tab 0    clotrimazole-betamethasone (LOTRISONE) topical cream Apply  to affected area two (2) times a day. 45 g 11    Blood-Glucose Meter (ONETOUCH VERIO FLEX) misc Used to test blood sugars 3 times daily 1 Each 0    glucose blood VI test strips (ONETOUCH VERIO) strip Used to test blood sugar 3 times daily. 100 Strip 11     Past Medical History:   Diagnosis Date    Abnormal EKG 05/17/2019    Diverticulosis 06/04/2019    [facility]    Dyslipidemia     Grade I diastolic dysfunction 38/18/7403    echo    Headache 11/06/2020    Hemorrhoids 06/04/2019    HTN (hypertension)     Low back pain     Obesity (BMI 30.0-34. 9)     Obesity (BMI 30.0-34. 9)     Preventative health care     S/P colonoscopy 06/28/2019    md viry -10 yrs -hemorrhoids,tics    Tinnitus of both ears      Past Surgical History:   Procedure Laterality Date    COLONOSCOPY N/A 6/28/2019    COLONOSCOPY performed by Lazarus Gang, MD at hospitals ENDOSCOPY     No Known Allergies      REVIEW OF SYSTEMS:  General: negative for - chills or fever  ENT: negative for - headaches, nasal congestion or tinnitus  Respiratory: negative for - cough, hemoptysis, shortness of breath or wheezing  Cardiovascular : negative for - chest pain, edema, palpitations or shortness of breath  Gastrointestinal: negative for - abdominal pain, blood in stools, heartburn or nausea/vomiting  Genito-Urinary: no dysuria, trouble voiding, or hematuria  Musculoskeletal: negative for - gait disturbance, joint pain, joint stiffness or joint swelling  Neurological: no TIA or stroke symptoms  Hematologic: no bruises, no bleeding, no swollen glands  Integument: no lumps, mole changes, nail changes or rash  Endocrine: no malaise/lethargy or unexpected weight changes      Social History     Socioeconomic History    Marital status:      Spouse name: Not on file    Number of children: Not on file    Years of education: Not on file    Highest education level: Not on file   Tobacco Use    Smoking status: Never Smoker    Smokeless tobacco: Never Used   Substance and Sexual Activity    Alcohol use: Never     Frequency: Never    Drug use: Never    Sexual activity: Yes     Partners: Female     Birth control/protection: None   Social History Narrative    Habits:  He is a lifetime nonsmoker, non drinker, non drug abuser.         Social History:  The patient is , lives with his wife. He was born in [place]. He has been in the [address] [place] [address],[address] since 2005. They have three children, two daughters ages 9 and 11, and a son age 3. He completed his ALIA at [employer]. He was in [facility] [employer] for five years. 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 Alev[employer] background is [employer].         Family History:  Father 76, alive and well. Mother 67 with diabetes. Six brothers and four sisters are alive and well. Family History   Problem Relation Age of Onset    Diabetes Mother        OBJECTIVE:    Visit Vitals  BP (!) 142/107   Pulse 79   Temp 97.9 °F (36.6 °C)   Resp 16   Ht 6' 2\" (1.88 m)   Wt 252 lb 12.8 oz (114.7 kg)   SpO2 96%   BMI 32.46 kg/m²     CONSTITUTIONAL: well , well nourished, appears age appropriate  EYES: perrla, eom intact  ENMT:moist mucous membranes, pharynx clear  NECK: supple. Thyroid normal  RESPIRATORY: Chest: clear bilaterally   CARDIOVASCULAR: Heart: regular rate and rhythm  GASTROINTESTINAL: Abdomen: soft, bowel sounds active  HEMATOLOGIC: no pathological lymph nodes palpated  MUSCULOSKELETAL: Extremities: no edema, pulse 1+   INTEGUMENT: No unusual rashes or suspicious skin lesions noted. Nails appear normal.  NEUROLOGIC: non-focal exam   MENTAL STATUS: alert and oriented, appropriate affect           ASSESSMENT:  1.  Essential hypertension    2. Grade I diastolic dysfunction    3. Type 2 diabetes mellitus without complication, without long-term current use of insulin (HCC)    4. Obesity (BMI 30.0-34.9)    5. Dyslipidemia    6. Nonintractable episodic headache, unspecified headache type      BP elevation is noted. It has been elevated the last two or three visits. We will begin Micardis 80 mg daily, expecting BP to drop into the 120s/80s. He has a known history of grade 1 diastolic dysfunction, which is currently asymptomatic. Known history of diabetes mellitus, which is well controlled. He would like to see a nutritionist, however. We talked to him about his weight. He is going to work on that now with the assistance of a nutritionist.    The dyslipidemia indicates excellent LDL control, triglycerides elevated, as would be expected with his dietary habits. I am concerned about the headache. It is new onset. He has only had it for the past three days. We will ask for a CT scan of the head with contrast to exclude more significant pathology. He agrees with the plan. He will be back to see us in about two weeks, primarily so we can look at his BP, review the CT scan results and see how he is doing. I have discussed the diagnosis with the patient and the intended plan as seen in the  Orders. The patient understands and agees with the plan. The patient has   received an after visit summary and questions were answered concerning  future plans  Patient labs and/or xrays were reviewed  Past records were reviewed. PLAN:  .  Orders Placed This Encounter    CT HEAD W CONT    REFERRAL TO DIETITIAN    telmisartan (MICARDIS) 80 mg tablet       Follow-up and Dispositions    · Return in about 2 weeks (around 11/20/2020). ATTENTION:   This medical record was transcribed using an electronic medical records system. Although proofread, it may and can contain electronic and spelling errors.   Other human spelling and other errors may be present. Corrections may be executed at a later time. Please feel free to contact us for any clarifications as needed.

## 2020-11-09 ENCOUNTER — APPOINTMENT (OUTPATIENT)
Dept: CT IMAGING | Age: 50
End: 2020-11-09
Attending: EMERGENCY MEDICINE
Payer: MEDICAID

## 2020-11-09 ENCOUNTER — HOSPITAL ENCOUNTER (EMERGENCY)
Age: 50
Discharge: HOME | End: 2020-11-09
Attending: EMERGENCY MEDICINE
Payer: MEDICAID

## 2020-11-09 VITALS
OXYGEN SATURATION: 99 % | SYSTOLIC BLOOD PRESSURE: 142 MMHG | BODY MASS INDEX: 31.97 KG/M2 | HEIGHT: 74 IN | HEART RATE: 60 BPM | WEIGHT: 249.12 LBS | DIASTOLIC BLOOD PRESSURE: 94 MMHG | RESPIRATION RATE: 20 BRPM | TEMPERATURE: 96.2 F

## 2020-11-09 DIAGNOSIS — R51.9 ACUTE INTRACTABLE HEADACHE, UNSPECIFIED HEADACHE TYPE: Primary | ICD-10-CM

## 2020-11-09 LAB
ALBUMIN SERPL-MCNC: 4.3 G/DL (ref 3.5–5)
ALBUMIN/GLOB SERPL: 1 {RATIO} (ref 1.1–2.2)
ALP SERPL-CCNC: 99 U/L (ref 45–117)
ALT SERPL-CCNC: 68 U/L (ref 12–78)
ANION GAP SERPL CALC-SCNC: 9 MMOL/L (ref 5–15)
AST SERPL-CCNC: 24 U/L (ref 15–37)
BASOPHILS # BLD: 0.1 K/UL (ref 0–0.1)
BASOPHILS NFR BLD: 1 % (ref 0–1)
BILIRUB SERPL-MCNC: 0.9 MG/DL (ref 0.2–1)
BUN SERPL-MCNC: 15 MG/DL (ref 6–20)
BUN/CREAT SERPL: 12 (ref 12–20)
CALCIUM SERPL-MCNC: 9.7 MG/DL (ref 8.5–10.1)
CHLORIDE SERPL-SCNC: 102 MMOL/L (ref 97–108)
CO2 SERPL-SCNC: 26 MMOL/L (ref 21–32)
COMMENT, HOLDF: NORMAL
CREAT SERPL-MCNC: 1.3 MG/DL (ref 0.7–1.3)
DIFFERENTIAL METHOD BLD: ABNORMAL
EOSINOPHIL # BLD: 0 K/UL (ref 0–0.4)
EOSINOPHIL NFR BLD: 0 % (ref 0–7)
ERYTHROCYTE [DISTWIDTH] IN BLOOD BY AUTOMATED COUNT: 13.3 % (ref 11.5–14.5)
GLOBULIN SER CALC-MCNC: 4.5 G/DL (ref 2–4)
GLUCOSE SERPL-MCNC: 108 MG/DL (ref 65–100)
HCT VFR BLD AUTO: 49 % (ref 36.6–50.3)
HGB BLD-MCNC: 16.5 G/DL (ref 12.1–17)
IMM GRANULOCYTES # BLD AUTO: 0 K/UL (ref 0–0.04)
IMM GRANULOCYTES NFR BLD AUTO: 0 % (ref 0–0.5)
LYMPHOCYTES # BLD: 2.9 K/UL (ref 0.8–3.5)
LYMPHOCYTES NFR BLD: 49 % (ref 12–49)
MCH RBC QN AUTO: 27.8 PG (ref 26–34)
MCHC RBC AUTO-ENTMCNC: 33.7 G/DL (ref 30–36.5)
MCV RBC AUTO: 82.6 FL (ref 80–99)
MONOCYTES # BLD: 0.5 K/UL (ref 0–1)
MONOCYTES NFR BLD: 8 % (ref 5–13)
NEUTS SEG # BLD: 2.6 K/UL (ref 1.8–8)
NEUTS SEG NFR BLD: 42 % (ref 32–75)
NRBC # BLD: 0 K/UL (ref 0–0.01)
NRBC BLD-RTO: 0 PER 100 WBC
PLATELET # BLD AUTO: 284 K/UL (ref 150–400)
PMV BLD AUTO: 9.4 FL (ref 8.9–12.9)
POTASSIUM SERPL-SCNC: 4.2 MMOL/L (ref 3.5–5.1)
PROT SERPL-MCNC: 8.8 G/DL (ref 6.4–8.2)
RBC # BLD AUTO: 5.93 M/UL (ref 4.1–5.7)
SAMPLES BEING HELD,HOLD: NORMAL
SODIUM SERPL-SCNC: 137 MMOL/L (ref 136–145)
WBC # BLD AUTO: 6.1 K/UL (ref 4.1–11.1)

## 2020-11-09 PROCEDURE — 96374 THER/PROPH/DIAG INJ IV PUSH: CPT

## 2020-11-09 PROCEDURE — 99283 EMERGENCY DEPT VISIT LOW MDM: CPT

## 2020-11-09 PROCEDURE — 99282 EMERGENCY DEPT VISIT SF MDM: CPT

## 2020-11-09 PROCEDURE — 74011250636 HC RX REV CODE- 250/636: Performed by: EMERGENCY MEDICINE

## 2020-11-09 PROCEDURE — 36415 COLL VENOUS BLD VENIPUNCTURE: CPT

## 2020-11-09 PROCEDURE — 85025 COMPLETE CBC W/AUTO DIFF WBC: CPT

## 2020-11-09 PROCEDURE — 80053 COMPREHEN METABOLIC PANEL: CPT

## 2020-11-09 PROCEDURE — 74011250637 HC RX REV CODE- 250/637: Performed by: EMERGENCY MEDICINE

## 2020-11-09 PROCEDURE — 70450 CT HEAD/BRAIN W/O DYE: CPT

## 2020-11-09 PROCEDURE — 96375 TX/PRO/DX INJ NEW DRUG ADDON: CPT

## 2020-11-09 RX ORDER — METOCLOPRAMIDE HYDROCHLORIDE 5 MG/ML
10 INJECTION INTRAMUSCULAR; INTRAVENOUS
Status: COMPLETED | OUTPATIENT
Start: 2020-11-09 | End: 2020-11-09

## 2020-11-09 RX ORDER — KETOROLAC TROMETHAMINE 30 MG/ML
30 INJECTION, SOLUTION INTRAMUSCULAR; INTRAVENOUS
Status: COMPLETED | OUTPATIENT
Start: 2020-11-09 | End: 2020-11-09

## 2020-11-09 RX ORDER — ACETAMINOPHEN 500 MG
1000 TABLET ORAL ONCE
Status: COMPLETED | OUTPATIENT
Start: 2020-11-09 | End: 2020-11-09

## 2020-11-09 RX ADMIN — ACETAMINOPHEN 1000 MG: 500 TABLET ORAL at 16:36

## 2020-11-09 RX ADMIN — SODIUM CHLORIDE 1000 ML: 900 INJECTION, SOLUTION INTRAVENOUS at 16:36

## 2020-11-09 RX ADMIN — KETOROLAC TROMETHAMINE 30 MG: 30 INJECTION, SOLUTION INTRAMUSCULAR at 19:00

## 2020-11-09 RX ADMIN — METOCLOPRAMIDE 10 MG: 5 INJECTION, SOLUTION INTRAMUSCULAR; INTRAVENOUS at 16:36

## 2020-11-09 NOTE — ED PROVIDER NOTES
59-year-old gentleman with a history of diabetes and hypertension presents to the emergency department from home today with complaint of headache which has been intermittent since Friday and worsening over the past 3 hours. On Friday when he saw his primary care doctor he was started on telmisartan for hypertension and scheduled for a CT scan in a few days. He denies any fevers or nausea or vomiting. No focal neurologic symptoms. He does not typically get headaches. The history is provided by the patient. Headache    This is a new problem. The current episode started more than 2 days ago. The problem occurs constantly. The problem has been gradually worsening. The headache is aggravated by nothing. The pain is severe. Pertinent negatives include no fever, no chest pressure, no palpitations, no syncope, no shortness of breath, no weakness, no nausea and no vomiting. Hypertension    Associated symptoms include headaches. Pertinent negatives include no chest pain, no palpitations, no shortness of breath, no nausea and no vomiting. Past Medical History:   Diagnosis Date    Abnormal EKG 05/17/2019    Diverticulosis 06/04/2019    DM2 (diabetes mellitus, type 2) ([facility] 05/17/2019    Dyslipidemia     Grade I diastolic dysfunction 02/27/3165    echo    Headache 11/06/2020    Hemorrhoids 06/04/2019    HTN (hypertension)     Low back pain     Obesity (BMI 30.0-34. 9)     Obesity (BMI 30.0-34. 9)     Preventative health care     S/P colonoscopy 06/28/2019    md viry -10 yrs -hemorrhoids,tics    Tinnitus of both ears        Past Surgical History:   Procedure Laterality Date    COLONOSCOPY N/A 6/28/2019    COLONOSCOPY performed by [place] MD at [facility] ENDOSCOPY         Family History:   Problem Relation Age of Onset    Diabetes Mother        Social History     Socioeconomic History    Marital status:      Spouse name: Not on file    Number of children: Not on file    Years of education: Not on file    Highest education level: Not on file   Occupational History    Not on file   Social Needs    Financial resource strain: Not on file    Food insecurity     Worry: Not on file     Inability: Not on file    Transportation needs     Medical: Not on file     Non-medical: Not on file   Tobacco Use    Smoking status: Never Smoker    Smokeless tobacco: Never Used   Substance and Sexual Activity    Alcohol use: Never     Frequency: Never    Drug use: Never    Sexual activity: Yes     Partners: Female     Birth control/protection: None   Lifestyle    Physical activity     Days per week: Not on file     Minutes per session: Not on file    Stress: Not on file   Relationships    Social connections     Talks on phone: Not on file     Gets together: Not on file     Attends Orthodox service: Not on file     Active member of club or organization: Not on file     Attends meetings of clubs or organizations: Not on file     Relationship status: Not on file    Intimate partner violence     Fear of current or ex partner: Not on file     Emotionally abused: Not on file     Physically abused: Not on file     Forced sexual activity: Not on file   Other Topics Concern    Not on file   Social History Narrative    Habits:  He is a lifetime nonsmoker, non drinker, non drug abuser.         Social History:  The patient is , lives with his wife. He was born in [place]. He has been in the [address] [place] [address],[address] since 2005. They have three children, two daughters ages 9 and 11, and a son age 3. He completed his ALIA at [employer]. He was in [employer] for five years. 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         Family History:  Father 76, alive and well. Mother 67 with diabetes. Six brothers and four sisters are alive and well. ALLERGIES: Patient has no known allergies. Review of Systems   Constitutional: Negative for fatigue and fever.    HENT: Negative for sneezing and sore throat. Respiratory: Negative for cough and shortness of breath. Cardiovascular: Negative for chest pain, palpitations, leg swelling and syncope. Gastrointestinal: Negative for abdominal pain, diarrhea, nausea and vomiting. Genitourinary: Negative for difficulty urinating and dysuria. Musculoskeletal: Negative for arthralgias and myalgias. Skin: Negative for color change and rash. Neurological: Positive for headaches. Negative for weakness. Psychiatric/Behavioral: Negative for agitation and behavioral problems. Vitals:    11/09/20 1544   BP: (!) 144/93   Pulse: 87   Resp: 20   Temp: 97.7 °F (36.5 °C)   SpO2: 98%   Weight: 113 kg (249 lb 1.9 oz)   Height: 6' 2\" (1.88 m)            Physical Exam  Vitals signs and nursing note reviewed. Constitutional:       General: He is not in acute distress. Appearance: Normal appearance. He is not ill-appearing, toxic-appearing or diaphoretic. HENT:      Head: Normocephalic and atraumatic. Nose: Nose normal.      Mouth/Throat:      Mouth: Mucous membranes are moist.      Pharynx: Oropharynx is clear. Eyes:      Extraocular Movements: Extraocular movements intact. Conjunctiva/sclera: Conjunctivae normal.      Pupils: Pupils are equal, round, and reactive to light. Neck:      Musculoskeletal: Normal range of motion and neck supple. No neck rigidity or muscular tenderness. Cardiovascular:      Rate and Rhythm: Normal rate. Pulses: Normal pulses. Heart sounds: Normal heart sounds. Pulmonary:      Effort: Pulmonary effort is normal. No respiratory distress. Breath sounds: Normal breath sounds. Abdominal:      General: Abdomen is flat. There is no distension. Palpations: Abdomen is soft. Tenderness: There is no abdominal tenderness. There is no guarding or rebound. Musculoskeletal: Normal range of motion. General: No swelling, tenderness, deformity or signs of injury. Skin:     General: Skin is warm and dry. Capillary Refill: Capillary refill takes less than 2 seconds. Neurological:      General: No focal deficit present. Mental Status: He is alert and oriented to person, place, and time. GCS: GCS eye subscore is 4. GCS verbal subscore is 5. GCS motor subscore is 6. Cranial Nerves: Cranial nerves are intact. Sensory: Sensation is intact. Motor: Motor function is intact. Coordination: Coordination is intact. Gait: Gait is intact. Psychiatric:         Mood and Affect: Mood normal.         Behavior: Behavior normal.          [facility]       Procedures          1630: Patient turned over to Dr. Marianne Robins pending CT scan and disposition. Anticipate discharge if scan negative and he improved clinically.

## 2020-11-09 NOTE — ED TRIAGE NOTES
Triage Note; Patient is coming in for headache and high blood pressure. Patient was stared on new blood pressure medicine at Friday. Patient took Tylenol at 1430 ES Tylenol without relief.

## 2020-11-09 NOTE — ED NOTES
4:28 PM  Change of shift. Care of patient taken over from Dr. Miguel Angel Arango; H&P reviewed, bedside handoff complete. Awaiting head CT, treatment of headache. If head CT negative will send home and follow-up with primary care doctor.

## 2020-11-10 RX ORDER — AMLODIPINE BESYLATE 5 MG/1
5 TABLET ORAL DAILY
Qty: 30 TAB | Refills: 11 | Status: SHIPPED | OUTPATIENT
Start: 2020-11-10 | End: 2020-11-20 | Stop reason: SINTOL

## 2020-11-12 ENCOUNTER — HOSPITAL ENCOUNTER (EMERGENCY)
Age: 50
Discharge: HOME | End: 2020-11-12
Attending: EMERGENCY MEDICINE
Payer: MEDICAID

## 2020-11-12 VITALS
HEART RATE: 78 BPM | OXYGEN SATURATION: 99 % | SYSTOLIC BLOOD PRESSURE: 123 MMHG | HEIGHT: 74 IN | BODY MASS INDEX: 32.28 KG/M2 | WEIGHT: 251.54 LBS | DIASTOLIC BLOOD PRESSURE: 82 MMHG | RESPIRATION RATE: 16 BRPM

## 2020-11-12 DIAGNOSIS — R51.9 NONINTRACTABLE HEADACHE, UNSPECIFIED CHRONICITY PATTERN, UNSPECIFIED HEADACHE TYPE: Primary | ICD-10-CM

## 2020-11-12 DIAGNOSIS — R51.9 NONINTRACTABLE EPISODIC HEADACHE, UNSPECIFIED HEADACHE TYPE: Primary | ICD-10-CM

## 2020-11-12 PROCEDURE — 99282 EMERGENCY DEPT VISIT SF MDM: CPT

## 2020-11-12 PROCEDURE — 96372 THER/PROPH/DIAG INJ SC/IM: CPT

## 2020-11-12 PROCEDURE — 74011250636 HC RX REV CODE- 250/636: Performed by: EMERGENCY MEDICINE

## 2020-11-12 RX ORDER — BUTALBITAL, ACETAMINOPHEN AND CAFFEINE 50; 325; 40 MG/1; MG/1; MG/1
1 TABLET ORAL
Qty: 60 TAB | Refills: 2 | OUTPATIENT
Start: 2020-11-12 | End: 2022-03-04

## 2020-11-12 RX ORDER — KETOROLAC TROMETHAMINE 30 MG/ML
30 INJECTION, SOLUTION INTRAMUSCULAR; INTRAVENOUS
Status: COMPLETED | OUTPATIENT
Start: 2020-11-12 | End: 2020-11-12

## 2020-11-12 RX ADMIN — KETOROLAC TROMETHAMINE 30 MG: 30 INJECTION, SOLUTION INTRAMUSCULAR at 03:25

## 2020-11-12 NOTE — DISCHARGE INSTRUCTIONS
Patient Education   Work up a few days ago was reassuring. Your blood pressure is not elevated to a dangerous range tonight. Continue all your medications. If your headache returns you can take tylenol and/or Ibuprofen for your pain. Follow up with your primary care doctor. If after trying tylenol and or Motrin,  your headache and it is getting worse, you develop vomiting, change in speech/vision, focal weakness/numbness, etc. Return here. Headache: After Your Visit to the Emergency Room  Your Care Instructions  Headaches have many possible causes. Most headaches are not a sign of serious problems and will get better on their own. Home treatment may help you feel better soon. Even though you have been released from the emergency room, you still need to watch for any problems. The doctor carefully checked you. But sometimes problems can develop later. If you have new symptoms, or if your symptoms do not get better, return to the emergency room or call your doctor right away. A visit to the emergency room is only one step in your treatment. Even if you feel better, you still need to do what your doctor recommends, such as going to all suggested follow-up appointments and taking medicines exactly as directed. This will help you recover and help prevent future problems. How can you care for yourself at home? · Have someone drive you home if you have taken pain medicine. Do not drive yourself. · Rest in a quiet, dark room until your headache is gone. Close your eyes, and try to relax or go to sleep. Do not watch TV or read. · Put ice or a cold pack on the area for 10 to 20 minutes at a time. Put a thin cloth between the ice and your skin. · Use a warm, moist towel or a heating pad set on low to relax tight shoulder and neck muscles. · Have someone gently massage your neck and shoulders. · Take pain medicines exactly as directed.   ¨ If the doctor gave you a prescription medicine for pain, take it as prescribed. ¨ If you are not taking a prescription pain medicine, ask your doctor if you can take an over-the-counter medicine. · Limit your caffeine intake to 1 to 2 cups a day. People who drink a lot of caffeine often get a headache several hours after their last drink of caffeine. Or they may wake up with a headache that is relieved by drinking caffeine. Cut down slowly to avoid caffeine-withdrawal headaches. · Seek help if you have depression or anxiety. Your headaches may be linked to these conditions. When should you call for help? Call 911 if:  · You have signs of a stroke. These may include:  ¨ Sudden numbness, paralysis, or weakness in your face, arm, or leg, especially on only one side of your body. ¨ New problems with walking or balance. ¨ Sudden vision changes. ¨ Drooling or slurred speech. ¨ New problems speaking or understanding simple statements, or feeling confused. ¨ A sudden, severe headache that is different from past headaches. · You have other symptoms that you think are a medical emergency. Return to the emergency room now if:  · You get a fever and a stiff neck. · Your headache gets worse. · You have new nausea and vomiting, or you cannot keep down food or liquids. Call your doctor today if:  · Your headache does not get better within 1 to 2 days. · Your headaches get worse or happen more often. Where can you learn more? Go to [facility]  Enter D038 in the search box to learn more about \"Headache: After Your Visit to the Emergency Room. \"   © 5145-7450 Healthwise, Incorporated. Care instructions adapted under license by [employer] (which disclaims liability or warranty for this information).  This care instruction is for use with your licensed healthcare professional. If you have questions about a medical condition or this instruction, always ask your healthcare professional. [place] [address] any warranty or liability for your use of this information.   Content Version: 9.3.55925; Last Revised: October 20, 2011

## 2020-11-12 NOTE — ED TRIAGE NOTES
Arrives with cc of recurrent headache and concerns for htn. Was seen 11/9/20 ED for same. States headache is generalized and moves from frontal to temporal to posterior head.  Pain 7/10, last episode started around 11p. recently started on telmisartan 80mg and amlodipine 5mg     PCP: Demond Villatoro

## 2020-11-12 NOTE — ED PROVIDER NOTES
HPI     19-year-old male with history of hypertension, diabetes, high cholesterol, presents the emergency department with recurrent headache and concern for high blood pressure. Patient was seen in the ED 3 days ago for the same complaint and had a negative head CT and blood work. His symptoms resolved with Reglan Tylenol and Toradol. Patient states he was recently started on a new blood pressure medicine, Telmisartan,  on Friday and his primary care doctor added amlodipine today. He states tonight around midnight he started with a diffuse headache. He denies any nausea or vomiting. He states he feels a little dizzy with the headaches. He denies any change in vision, change in speech, or any focal weakness or numbness. He states he gets tingling in his right hand only when he lays on his right side. He has no tingling now. He denies any chest pain palpitations or shortness of breath. He denies any fever congestion or cough. He denies any abdominal pain vomiting or diarrhea. He denies falling or hitting his head. He has not taken anything for his headache prior to coming in he reports his pain is a 7 out of 10. Past Medical History:   Diagnosis Date    Abnormal EKG 05/17/2019    Diverticulosis 06/04/2019    DM2 (diabetes mellitus, type 2) ([facility] 05/17/2019    Dyslipidemia     Grade I diastolic dysfunction 37/58/6403    echo    Headache 11/06/2020    Hemorrhoids 06/04/2019    HTN (hypertension)     Low back pain     Obesity (BMI 30.0-34. 9)     Obesity (BMI 30.0-34. 9)     Preventative health care     S/P colonoscopy 06/28/2019    md viry -10 yrs -hemorrhoids,tics    Tinnitus of both ears        Past Surgical History:   Procedure Laterality Date    COLONOSCOPY N/A 6/28/2019    COLONOSCOPY performed by Maikel Newberry MD at [facility] ENDOSCOPY         Family History:   Problem Relation Age of Onset    Diabetes Mother        Social History     Socioeconomic History    Marital status:      Spouse name: Not on file    Number of children: Not on file    Years of education: Not on file    Highest education level: Not on file   Occupational History    Not on file   Social Needs    Financial resource strain: Not on file    Food insecurity     Worry: Not on file     Inability: Not on file    Transportation needs     Medical: Not on file     Non-medical: Not on file   Tobacco Use    Smoking status: Never Smoker    Smokeless tobacco: Never Used   Substance and Sexual Activity    Alcohol use: Never     Frequency: Never    Drug use: Never    Sexual activity: Yes     Partners: Female     Birth control/protection: None   Lifestyle    Physical activity     Days per week: Not on file     Minutes per session: Not on file    Stress: Not on file   Relationships    Social connections     Talks on phone: Not on file     Gets together: Not on file     Attends Protestant service: Not on file     Active member of club or organization: Not on file     Attends meetings of clubs or organizations: Not on file     Relationship status: Not on file    Intimate partner violence     Fear of current or ex partner: Not on file     Emotionally abused: Not on file     Physically abused: Not on file     Forced sexual activity: Not on file   Other Topics Concern    Not on file   Social History Narrative    Habits:  He is a lifetime nonsmoker, non drinker, non drug abuser.         Social History:  The patient is , lives with his wife. He was born in [place]. He has been in the [address] [place] [address],[address] since 2005. They have three children, two daughters ages 9 and 11, and a son age 3. He completed his ALIA at [employer]. He was in [employer] for five years. 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 Roman [employer] background is [employer].         Family History:  Father 76, alive and well. Mother 67 with diabetes. Six brothers and four sisters are alive and well.          ALLERGIES: Patient has no known allergies. Review of Systems   Constitutional: Negative for fever. HENT: Negative for congestion. Respiratory: Negative for cough and shortness of breath. Cardiovascular: Negative for chest pain. Gastrointestinal: Negative for abdominal pain, nausea and vomiting. Genitourinary: Negative for dysuria. Skin: Negative for rash. Neurological: Positive for dizziness and headaches. Negative for facial asymmetry, speech difficulty, weakness and numbness. Vitals:    11/12/20 0305   BP: 132/89   Pulse: 78   Resp: 16   SpO2: 99%   Height: 6' 2\" (1.88 m)            Physical Exam  Constitutional:       General: He is not in acute distress. Appearance: He is well-developed. HENT:      Head: Normocephalic and atraumatic. Mouth/Throat:      Pharynx: No oropharyngeal exudate. Eyes:      General: No scleral icterus. Right eye: No discharge. Left eye: No discharge. Pupils: Pupils are equal, round, and reactive to light. Neck:      Musculoskeletal: Normal range of motion and neck supple. Vascular: No JVD. Cardiovascular:      Rate and Rhythm: Normal rate and regular rhythm. Heart sounds: Normal heart sounds. No murmur. Pulmonary:      Effort: Pulmonary effort is normal. No respiratory distress. Breath sounds: Normal breath sounds. No stridor. No wheezing or rales. Chest:      Chest wall: No tenderness. Abdominal:      General: Bowel sounds are normal. There is no distension. Palpations: Abdomen is soft. There is no mass. Tenderness: There is no abdominal tenderness. There is no guarding or rebound. Musculoskeletal: Normal range of motion. Skin:     General: Skin is warm and dry. Capillary Refill: Capillary refill takes less than 2 seconds. Findings: No rash. Neurological:      Mental Status: He is oriented to person, place, and time. Psychiatric:         Behavior: Behavior normal.         Thought Content:  Thought content normal.         Judgment: Judgment normal.          MDM       Procedures      Well appearing male with reassuring/normal neuro exam. Had a neg work up including labs and head CT 3 days ago. BP is not markedly elevated tonight. Will medicate with Toradol and reassess. Headache is better after Toradol. Blood pressure is good.   Patient reassured and encouraged to follow-up with primary care doctor return precautions provided

## 2020-11-13 ENCOUNTER — PATIENT OUTREACH (OUTPATIENT)
Dept: CASE MANAGEMENT | Age: 50
End: 2020-11-13

## 2020-11-13 NOTE — PROGRESS NOTES
Patient contacted regarding recent discharge and COVID-19 risk. Discussed COVID-19 related testing which was not done at this time. Care Transition Nurse/ Ambulatory Care Manager/ LPN Care Coordinator contacted the patient by telephone to perform post discharge assessment. Verified name and  with patient as identifiers. Patient has following risk factors of: diabetes and HTN. CTN/ACM/LPN reviewed discharge instructions, medical action plan and red flags related to discharge diagnosis. Reviewed and educated them on any new and changed medications related to discharge diagnosis. Advised obtaining a 90-day supply of all daily and as-needed medications. Advance Care Planning:   Does patient have an Advance Directive: no     Education provided regarding infection prevention, and signs and symptoms of COVID-19 and when to seek medical attention with patient who verbalized understanding. Discussed exposure protocols and quarantine from [address] you at higher risk for severe illness  and given an opportunity for questions and concerns. The patient agrees to contact the COVID-19 hotline [phone] or PCP office for questions related to their healthcare. CTN/ACM/LPN provided contact information for future reference. From CDC: Are you at higher risk for severe illness?  Wash your hands often.  Avoid close contact (6 feet, which is about two arm lengths) with people who are sick.  Put distance between yourself and other people if COVID-19 is spreading in your community.  Clean and disinfect frequently touched surfaces.  Avoid all cruise travel and non-essential air travel.  Call your healthcare professional if you have concerns about COVID-19 and your underlying condition or if you are sick.     For more information on steps you can take to protect yourself, see CDC's How to Protect Yourself      Patient/family/caregiver given information for Jenn Treadwell and agrees to enroll no      Plan for follow-up call in 7-14 days based on severity of symptoms and risk factors.

## 2020-11-20 ENCOUNTER — OFFICE VISIT (OUTPATIENT)
Dept: INTERNAL MEDICINE CLINIC | Age: 50
End: 2020-11-20
Payer: MEDICAID

## 2020-11-20 VITALS
RESPIRATION RATE: 18 BRPM | BODY MASS INDEX: 31.47 KG/M2 | OXYGEN SATURATION: 97 % | HEIGHT: 74 IN | DIASTOLIC BLOOD PRESSURE: 77 MMHG | HEART RATE: 85 BPM | SYSTOLIC BLOOD PRESSURE: 116 MMHG | WEIGHT: 245.2 LBS | TEMPERATURE: 98.2 F

## 2020-11-20 DIAGNOSIS — R51.9 NONINTRACTABLE EPISODIC HEADACHE, UNSPECIFIED HEADACHE TYPE: Primary | ICD-10-CM

## 2020-11-20 DIAGNOSIS — H91.93 BILATERAL HEARING LOSS, UNSPECIFIED HEARING LOSS TYPE: ICD-10-CM

## 2020-11-20 DIAGNOSIS — I51.89 GRADE I DIASTOLIC DYSFUNCTION: ICD-10-CM

## 2020-11-20 DIAGNOSIS — E11.9 TYPE 2 DIABETES MELLITUS WITHOUT COMPLICATION, WITHOUT LONG-TERM CURRENT USE OF INSULIN (HCC): ICD-10-CM

## 2020-11-20 DIAGNOSIS — E78.5 DYSLIPIDEMIA: ICD-10-CM

## 2020-11-20 DIAGNOSIS — I10 ESSENTIAL HYPERTENSION: ICD-10-CM

## 2020-11-20 PROCEDURE — 99214 OFFICE O/P EST MOD 30 MIN: CPT | Performed by: INTERNAL MEDICINE

## 2020-11-20 RX ORDER — VERAPAMIL HYDROCHLORIDE 180 MG/1
180 CAPSULE, EXTENDED RELEASE ORAL
Qty: 30 CAP | Refills: 5 | Status: SHIPPED | OUTPATIENT
Start: 2020-11-20 | End: 2021-02-09 | Stop reason: SDUPTHER

## 2020-11-20 RX ORDER — CANDESARTAN 16 MG/1
16 TABLET ORAL DAILY
Qty: 30 TAB | Refills: 5 | Status: SHIPPED | OUTPATIENT
Start: 2020-11-20 | End: 2021-02-09 | Stop reason: SDUPTHER

## 2020-11-20 NOTE — PROGRESS NOTES
SPORTS MEDICINE AND PRIMARY CARE  Jeffrey Herman MD, [address] [address]  Phone:  [phone]  Fax: [phone]       Chief Complaint   Patient presents with    Hypertension   . SUBJECTIVE:    Antonio Ly is a 48 y.o. male Patient returns today after two visits to the emergency room since we last saw him. On 11/09 he was seen by Providence Ormond, MD, complaining of headache, worse over the previous three hours, and underwent an evaluation, including CT scan without contrast, which was negative. He was seen again on 11/12/20 by Abisai Humphries with headache and concern about his blood pressure. He was given Toradol. Blood pressure was better, as well as the headache. He was reassured and encouraged to come in and see us today. Patient also has a history of primary hypertension, type 2 diabetes, grade 1 diastolic dysfunction, dyslipidemia, and is seen for evaluation. His blood sugar control has been excellent with hemoglobin A1c in September of 6.5. Patient continues to have a headache intermittently on the right side, right frontal area. He also has visual disturbances when he gets the headache in that his vision is decreased and he cannot see very well out of the right eye when he has the headaches. He wonders about seeing an ophthalmologist and is seen for evaluation. He has an appointment to see the neurologist on the 30th. Current Outpatient Medications   Medication Sig Dispense Refill    candesartan (ATACAND) 16 mg tablet Take 1 Tab by mouth daily. 30 Tab 5    verapamil ER (VERELAN) 180 mg CR capsule Take 1 Cap by mouth nightly. 30 Cap 5    butalbital-acetaminophen-caffeine (FIORICET, ESGIC) -40 mg per tablet Take 1 Tab by mouth every four (4) hours as needed for Headache. 60 Tab 2    atorvastatin (LIPITOR) 10 mg tablet Take 1 Tab by mouth daily.  30 Tab 11    metFORMIN (GLUCOPHAGE) 500 mg tablet Take 1 tablet daily with dinner 90 Tab 3    Blood-Glucose Meter (ONETOUCH VERIO FLEX) misc Used to test blood sugars 3 times daily 1 Each 0    glucose blood VI test strips (ONETOUCH VERIO) strip Used to test blood sugar 3 times daily. 100 Strip 11    lidocaine (Lidoderm) 5 % Apply patch to the affected area for 12 hours a day and remove for 12 hours a day. 5 Each 0    albuterol (PROVENTIL HFA, VENTOLIN HFA, PROAIR HFA) 90 mcg/actuation inhaler Take 2 Puffs by inhalation every four (4) hours as needed for Wheezing. 1 Inhaler 11    acetaminophen (TYLENOL) 500 mg tablet Take 2 Tabs by mouth every six (6) hours as needed for Pain or Fever. 30 Tab 0    ibuprofen (MOTRIN) 600 mg tablet Take 1 Tab by mouth every six (6) hours as needed for Pain (fever). 20 Tab 0    clotrimazole-betamethasone (LOTRISONE) topical cream Apply  to affected area two (2) times a day. 45 g 11     Past Medical History:   Diagnosis Date    Abnormal EKG 05/17/2019    Diverticulosis 06/04/2019    [facility] 05/17/2019    Dyslipidemia     Grade I diastolic dysfunction 27/22/3553    echo    Headache 11/06/2020    Hemorrhoids 06/04/2019    HTN (hypertension)     Low back pain     Obesity (BMI 30.0-34. 9)     Obesity (BMI 30.0-34. 9)     Preventative health care     S/P colonoscopy 06/28/2019    md viry -10 yrs -hemorrhoids,tics    Tinnitus of both ears      Past Surgical History:   Procedure Laterality Date    COLONOSCOPY N/A 6/28/2019    COLONOSCOPY performed by Torsten Santamaria MD at [facility] ENDOSCOPY     No Known Allergies      REVIEW OF SYSTEMS:  General: negative for - chills or fever  ENT: negative for - headaches, nasal congestion or tinnitus  Respiratory: negative for - cough, hemoptysis, shortness of breath or wheezing  Cardiovascular : negative for - chest pain, edema, palpitations or shortness of breath  Gastrointestinal: negative for - abdominal pain, blood in stools, heartburn or nausea/vomiting  Genito-Urinary: no dysuria, trouble voiding, or hematuria  Musculoskeletal: negative for - gait disturbance, joint pain, joint stiffness or joint swelling  Neurological: no TIA or stroke symptoms  Hematologic: no bruises, no bleeding, no swollen glands  Integument: no lumps, mole changes, nail changes or rash  Endocrine: no malaise/lethargy or unexpected weight changes      Social History     Socioeconomic History    Marital status:      Spouse name: Not on file    Number of children: Not on file    Years of education: Not on file    Highest education level: Not on file   Tobacco Use    Smoking status: Never Smoker    Smokeless tobacco: Never Used   Substance and Sexual Activity    Alcohol use: Never     Frequency: Never    Drug use: Never    Sexual activity: Yes     Partners: Female     Birth control/protection: None   Social History Narrative    Habits:  He is a lifetime nonsmoker, non drinker, non drug abuser.         Social History:  The patient is , lives with his wife. He was born in [place]. He has been in the [address] [place] [address],[address] since 2005. They have three children, two daughters ages 9 and 11, and a son age 3. He completed his ALIA at [employer]. He was in [employer] for five years. 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 [employer] background is [employer].         Family History:  Father 76, alive and well. Mother 67 with diabetes. Six brothers and four sisters are alive and well. Family History   Problem Relation Age of Onset    Diabetes Mother        OBJECTIVE:    Visit Vitals  /77   Pulse 85   Temp 98.2 °F (36.8 °C) (Oral)   Resp 18   Ht 6' 2\" (1.88 m)   Wt 245 lb 3.2 oz (111.2 kg)   SpO2 97%   BMI 31.48 kg/m²     CONSTITUTIONAL: well , well nourished, appears age appropriate  EYES: perrla, eom intact  ENMT:moist mucous membranes, pharynx clear  NECK: supple.  Thyroid normal  RESPIRATORY: Chest: clear bilaterally   CARDIOVASCULAR: Heart: regular rate and rhythm  GASTROINTESTINAL: Abdomen: soft, bowel sounds active  HEMATOLOGIC: no pathological lymph nodes palpated  MUSCULOSKELETAL: Extremities: no edema, pulse 1+   INTEGUMENT: No unusual rashes or suspicious skin lesions noted. Nails appear normal.  NEUROLOGIC: non-focal exam   MENTAL STATUS: alert and oriented, appropriate affect           ASSESSMENT:  1. Nonintractable episodic headache, unspecified headache type    2. Essential hypertension    3. Bilateral hearing loss, unspecified hearing loss type    4. Type 2 diabetes mellitus without complication, without long-term current use of insulin ([address]    5. Grade I diastolic dysfunction    6. Dyslipidemia      CT scan negative, but I am concerned about the visual disturbances with the right eye when he gets the headache, and therefore we will ask for an MRI of the head. While we are waiting for the neurologist to see him, we will put him on Topamax prophylactically to prevent headaches and see if we can get this headache under control. BP control is at goal, which is excellent. Hearing loss is unchanged. As noted above, blood sugar control is excellent. No evidence of cardiac decompensation. He is taking Atorvastatin for dyslipidemia. He will be back to see us after all this is accomplished. He is also having issues with the medication. He says he is taking too many medicines and feels that the current blood pressure medicine is causing him to feel dizzy and pain in his kidneys on the right. We advise him that we give him the medication to make his blood pressure normal, sugar normal, keep him from having headaches. Therefore, we will not start Topamax. We will continue Fioricet only as needed. We will discontinue Amlodipine and replace with Verapamil, which may also help him with headaches. We will stop Micardis and replace with Candesartan if his insurance will pay for it, which will also help headaches and also have an effect on protecting kidneys.   We are stopping both of them because he thinks that is the reason for his side effects, specifically headaches. We will then ask him to come back in two weeks so we can see where he is at with his blood pressure and his complaints. I have discussed the diagnosis with the patient and the intended plan as seen in the  Orders. The patient understands and agees with the plan. The patient has   received an after visit summary and questions were answered concerning  future plans  Patient labs and/or xrays were reviewed  Past records were reviewed. PLAN:  .  Orders Placed This Encounter    MRI BRAIN W WO CONT    REFERRAL TO OPHTHALMOLOGY    candesartan (ATACAND) 16 mg tablet    verapamil ER (VERELAN) 180 mg CR capsule       Follow-up and Dispositions    · Return in about 2 weeks (around 12/4/2020). ATTENTION:   This medical record was transcribed using an electronic medical records system. Although proofread, it may and can contain electronic and spelling errors. Other human spelling and other errors may be present. Corrections may be executed at a later time. Please feel free to contact us for any clarifications as needed.

## 2020-11-20 NOTE — PROGRESS NOTES
1. Have you been to the ER, urgent care clinic since your last visit? Hospitalized since your last visit? Yes When: 11- Where: [facility] Reason for visit: headaches    2. Have you seen or consulted any other health care providers outside of the [address] [place] [address] since your last visit? Include any pap smears or colon screening.  No     Wants to discuss BP medication and headache

## 2020-11-30 ENCOUNTER — PATIENT OUTREACH (OUTPATIENT)
Dept: CASE MANAGEMENT | Age: 50
End: 2020-11-30

## 2020-11-30 ENCOUNTER — OFFICE VISIT (OUTPATIENT)
Dept: NEUROLOGY | Age: 50
End: 2020-11-30
Payer: MEDICAID

## 2020-11-30 VITALS
OXYGEN SATURATION: 98 % | RESPIRATION RATE: 18 BRPM | HEART RATE: 83 BPM | DIASTOLIC BLOOD PRESSURE: 80 MMHG | BODY MASS INDEX: 31.46 KG/M2 | SYSTOLIC BLOOD PRESSURE: 124 MMHG | WEIGHT: 245 LBS

## 2020-11-30 DIAGNOSIS — I10 HYPERTENSION, UNSPECIFIED TYPE: ICD-10-CM

## 2020-11-30 DIAGNOSIS — G43.009 MIGRAINE WITHOUT AURA AND WITHOUT STATUS MIGRAINOSUS, NOT INTRACTABLE: Primary | ICD-10-CM

## 2020-11-30 PROCEDURE — 99205 OFFICE O/P NEW HI 60 MIN: CPT | Performed by: PSYCHIATRY & NEUROLOGY

## 2020-11-30 NOTE — PROGRESS NOTES
[facility]   NEW PATIENT EVALUATION/CONSULTATION       PATIENT NAME: Rob Lowery    MRN: [id number]    REASON FOR CONSULTATION: Headaches    11/30/20      Previous records (physician notes, laboratory reports, and radiology reports) and imaging studies were reviewed and summarized. My recommendations will be communicated back to the patient's physician(s) via electronic medical record and/or by [address] mail. HISTORY OF PRESENT ILLNESS:  Rob Lowery is a 48 y.o.  male presenting for evaluation of headaches. Pt reports onset of headache 4 weeks ago. He also noted new onset HTN. He was seen in the ED with head CT which did not reveal any acute process. He did follow up with his PCP who prescribed anti-hypertensive medication. He has noted some improvement in headaches since this time, although they are still occurring frequently. Location: R hemisphere  Character: pressure  Intensity: On average 7/10  Frequency: 4-5x weekly  # HA free days per month: 8  Duration: 4 hours  Aura: None  Associated Sx with HA: +nausea/vomiting, no phonophotophobia. Neurological ROS: Denies focal weakness, numbness or vision loss associated with headaches. +Blurred vision, +dizziness  Systemic ROS:   No h/o snoring  Caffeine use: None  H/O Head trauma: None    Any change in pattern of HA? See above    Triggers: None. Worse when sleeping on the right side.    Alleviating factors: Sleep/rest  FHx HA/migraine: None    Treatment so far: Fioricet-using rescue therapy 3-4x weekly, Verapamil (prescribed for HTN)    Investigations so far: CT head normal      PAST MEDICAL HISTORY:  Past Medical History:   Diagnosis Date    Abnormal EKG 05/17/2019    Diverticulosis 06/04/2019    DM2 (diabetes mellitus, type 2) ([facility] [address] 05/17/2019    Dyslipidemia     Grade I diastolic dysfunction 94/62/2714    echo    Headache 11/06/2020    Hemorrhoids 06/04/2019    HTN (hypertension)     Low back pain     Obesity (BMI 30.0-34. 9)     Obesity (BMI 30.0-34. 9)     Preventative health care     S/P colonoscopy 06/28/2019    md viry -10 yrs -hemorrhoids,tics    Tinnitus of both ears        PAST SURGICAL HISTORY:  Past Surgical History:   Procedure Laterality Date    COLONOSCOPY N/A 6/28/2019    COLONOSCOPY performed by Tanya Hutton MD at [facility] ENDOSCOPY       FAMILY HISTORY:   Family History   Problem Relation Age of Onset    Diabetes Mother          SOCIAL HISTORY:  Social History     Socioeconomic History    Marital status:      Spouse name: Not on file    Number of children: Not on file    Years of education: Not on file    Highest education level: Not on file   Tobacco Use    Smoking status: Never Smoker    Smokeless tobacco: Never Used   Substance and Sexual Activity    Alcohol use: Never     Frequency: Never    Drug use: Never    Sexual activity: Yes     Partners: Female     Birth control/protection: None   Social History Narrative    Habits:  He is a lifetime nonsmoker, non drinker, non drug abuser.         Social History:  The patient is , lives with his wife. He was born in [place]. He has been in the [address] [place] [address] since 2005. They have three children, two daughters ages 9 and 11, and a son age 3. He completed his ALIA at [employer]. He was in [facility] [employer] for five years. 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 Rastafari background is Religious.         Family History:  Father 76, alive and well. Mother 67 with diabetes. Six brothers and four sisters are alive and well. MEDICATIONS:   Current Outpatient Medications   Medication Sig Dispense Refill    candesartan (ATACAND) 16 mg tablet Take 1 Tab by mouth daily. 30 Tab 5    verapamil ER (VERELAN) 180 mg CR capsule Take 1 Cap by mouth nightly. 30 Cap 5    butalbital-acetaminophen-caffeine (FIORICET, ESGIC) -40 mg per tablet Take 1 Tab by mouth every four (4) hours as needed for Headache.  60 Tab 2    lidocaine (Lidoderm) 5 % Apply patch to the affected area for 12 hours a day and remove for 12 hours a day. 5 Each 0    atorvastatin (LIPITOR) 10 mg tablet Take 1 Tab by mouth daily. 30 Tab 11    metFORMIN (GLUCOPHAGE) 500 mg tablet Take 1 tablet daily with dinner 90 Tab 3    albuterol (PROVENTIL HFA, VENTOLIN HFA, PROAIR HFA) 90 mcg/actuation inhaler Take 2 Puffs by inhalation every four (4) hours as needed for Wheezing. 1 Inhaler 11    acetaminophen (TYLENOL) 500 mg tablet Take 2 Tabs by mouth every six (6) hours as needed for Pain or Fever. 30 Tab 0    ibuprofen (MOTRIN) 600 mg tablet Take 1 Tab by mouth every six (6) hours as needed for Pain (fever). 20 Tab 0    clotrimazole-betamethasone (LOTRISONE) topical cream Apply  to affected area two (2) times a day. 45 g 11    Blood-Glucose Meter (ONETOUCH VERIO FLEX) misc Used to test blood sugars 3 times daily 1 Each 0    glucose blood VI test strips (ONETOUCH VERIO) strip Used to test blood sugar 3 times daily. 100 Strip 11         ALLERGIES:  No Known Allergies      REVIEW OF SYSTEMS:  10 point ROS reviewed with patient. Please see scanned document under media. PHYSICAL EXAM:  Vital Signs:   Visit Vitals  /80   Pulse 83   Resp 18   Wt 245 lb (111.1 kg)   SpO2 98%   BMI 31.46 kg/m²        General Medical Exam:  General:  Well appearing, comfortable, in no apparent distress. Eyes/ENT: see cranial nerve examination. Neck: No masses appreciated. Full range of motion without tenderness. Respiratory:  Clear to auscultation, good air entry bilaterally. Cardiac:  Regular rate and rhythm, no murmur. GI:  Soft, non-tender, non-distended abdomen. Bowel sounds normal. No masses, organomegaly. Extremities:  No deformities, edema, or skin discoloration. Skin:  No rashes or lesions. Neurological:  · Mental Status:  Alert and oriented to person, place, and time with fluent speech.    · Cranial Nerves:   CNII/III/IV/VI: Optic disc w/clear margins b/l, visual fields full to confrontation, EOMI, PERRL, no ptosis or nystagmus. CN V: Facial sensation intact bilaterally, masseter 5/5   CN VII: Facial muscles symmetric and strong   CN VIII: Hears finger rub well bilaterally, intact vestibular function   CN IX/X: Normal palatal movement   CN XI: Full strength shoulder shrug bilaterally   CN XII: Tongue protrusion full and midline without fasciculation or atrophy  · Motor: Normal tone and muscle bulk with no pronator drift. No atrophy or fasciculations present on examination. Individual muscle group testing:  Shoulder abduction:   Left:5/5   Right : 5/5    Shoulder adduction:   Left:5/5   Right : 5/5    Elbow flexion:      Left:5/5   Right : 5/5  Elbow extension:    Left:5/5   Right : 5/5   Wrist flexion:    Left:5/5   Right : 5/5  Wrist extension:    Left:5/5   Right : 5/5  Arm pronation:   Left:5/5   Right : 5/5  Arm supination:   Left:5/5   Right : 5/5    Finger flexion:    Left:5/5   Right : 5/5    Finger extension:   Left:5/5   Right : 5/5   Finger abduction:  Left:5/5   Right : 5/5   Finger adduction:   Left:5/5   Right : 5/5  Hip flexion:     Left:5/5   Right : 5/5         Hip extension:   Left:5/5   Right : 5/5    Knee flexion:    Left:5/5   Right : 5/5    Knee extension:   Left:5/5   Right : 5/5    Dorsiflexion:     Left:5/5   Right : 5/5  Plantar flexion:    Left:5/5   Right : 5/5      · MSRs: No crossed adductors or clonus. RIGHT  LEFT   Brachioradialis 1+ 1+   Biceps 1+ 1+   Triceps 1+ 1+   Knee 1+ 1+   Achilles 1+ 1+        Plantar response Downward Downward          · Sensation: Normal and symmetric perception of pinprick, temperature, light touch, proprioception, and vibration; (-) Romberg. · Coordination: No dysmetria. Normal rapid alternating movements; finger-to-nose and heel-to- shin testing are within normal limits. · Gait: Normal native gait    PERTINENT DATA:  INTERNAL RECORDS:  The patient's electronic medical record was reviewed.   The relevant details include:    CT Results (maximum last 3): Results from [place] encounter on 11/09/20   CT HEAD WO CONT    Narrative EXAM: CT HEAD WO CONT    INDICATION: headache    COMPARISON: None. CONTRAST: None. TECHNIQUE: Unenhanced CT of the head was performed using 5 mm images. Brain and  bone windows were generated. Coronal and sagittal reformats. CT dose reduction  was achieved through use of a standardized protocol tailored for this  examination and automatic exposure control for dose modulation. FINDINGS:  There are no extra-axial fluid collection, hemorrhage shift or masses. Impression IMPRESSION: Negative. ASSESSMENT:      ICD-10-CM ICD-9-CM    1. Migraine without aura and without status migrainosus, not intractable  G43.009 346.10 SED RATE (ESR)   2. Hypertension, unspecified type  I10 36.9    48year old pleasant AAM presenting for evaluation of new onset right hemispheric headaches x 4 weeks with migrainous features prompting recent ED evaluation with evidence of uncontrolled HTN. Head CT did not reveal any acute intracranial pathology. He has noted improvement in headaches after initiation of Verapamil for HTN. Neurological examination is non-focal today. He is appropriately scheduled for MRI Brain to exclude any acute intracranial pathology given late age of headache onset. Will also obtain ESR level to exclude vasculopathy/TA. I recommend continuation of Verapamil to assist with both HTN and migraine prophylaxis. He was cautioned against overuse of analgesics for rescue headache treatment. Headache education  Discussed pathophysiology of headache. Discussed use of headache diary. Discussed treatment options, both abortive and preventive medications. Instructed patient about medications and potential side effects. Discussed medication overuse headache and to limit use of analgesics to less than 2 doses per week.      PLAN:  · ESR level  · MRI Brain WWO pending   · Continue Verapamil for headache prophylaxis  · Limit use of rescue headache medication to no more than twice weekly        Follow-up and Dispositions    · Return in about 6 weeks (around 1/11/2021). Haleigh Jarrell DO  Staff Neurologist  [address] [facility] Board of Psychiatry & Neurology       CC Referring provider:    Shivani Morfin MD

## 2020-11-30 NOTE — PROGRESS NOTES
Patient resolved from [address] Transitions episode on 11/30/20 Discussed COVID-19 related testing which was not done at this time. Patient/family has been provided the following resources and education related to COVID-19:                         Signs, symptoms and red flags related to COVID-19            CDC exposure and quarantine guidelines            [facility] exposure contact - [phone]            Contact for their local Department of Health                 Patient currently reports that the following symptoms have improved:  no new symptoms. No further outreach scheduled with this CTN/ACM/LPN/HC/ MA. Episode of Care resolved. Patient has this CTN/ACM/LPN/HC/MA contact information if future needs arise.

## 2020-12-01 LAB — ERYTHROCYTE [SEDIMENTATION RATE] IN BLOOD BY WESTERGREN METHOD: 13 MM/HR (ref 0–30)

## 2020-12-04 ENCOUNTER — HOSPITAL ENCOUNTER (OUTPATIENT)
Dept: MRI IMAGING | Age: 50
Discharge: HOME | End: 2020-12-04
Attending: INTERNAL MEDICINE
Payer: MEDICAID

## 2020-12-04 VITALS — WEIGHT: 245 LBS | BODY MASS INDEX: 31.46 KG/M2

## 2020-12-04 DIAGNOSIS — R51.9 NONINTRACTABLE EPISODIC HEADACHE, UNSPECIFIED HEADACHE TYPE: ICD-10-CM

## 2020-12-04 PROCEDURE — 70553 MRI BRAIN STEM W/O & W/DYE: CPT

## 2020-12-04 PROCEDURE — A9575 INJ GADOTERATE MEGLUMI 0.1ML: HCPCS | Performed by: INTERNAL MEDICINE

## 2020-12-04 PROCEDURE — 74011250636 HC RX REV CODE- 250/636: Performed by: INTERNAL MEDICINE

## 2020-12-04 RX ORDER — GADOTERATE MEGLUMINE 376.9 MG/ML
20 INJECTION INTRAVENOUS
Status: COMPLETED | OUTPATIENT
Start: 2020-12-04 | End: 2020-12-04

## 2020-12-04 RX ADMIN — GADOTERATE MEGLUMINE 20 ML: 376.9 INJECTION INTRAVENOUS at 08:55

## 2020-12-09 ENCOUNTER — HOSPITAL ENCOUNTER (OUTPATIENT)
Dept: NUTRITION | Age: 50
Discharge: HOME | End: 2020-12-09
Payer: MEDICAID

## 2020-12-09 DIAGNOSIS — I10 ESSENTIAL HYPERTENSION: ICD-10-CM

## 2020-12-09 DIAGNOSIS — E78.5 DYSLIPIDEMIA: ICD-10-CM

## 2020-12-09 DIAGNOSIS — E11.9 TYPE 2 DIABETES MELLITUS WITHOUT COMPLICATION, WITHOUT LONG-TERM CURRENT USE OF INSULIN (HCC): ICD-10-CM

## 2020-12-09 PROCEDURE — 97802 MEDICAL NUTRITION INDIV IN: CPT | Performed by: DIETITIAN, REGISTERED

## 2020-12-09 NOTE — PROGRESS NOTES
[address] [facility]     Nutrition Assessment  Medical Nutrition Therapy   Outpatient Initial Evaluation         Patient Name: Anthony Ortiz : 1970   Treatment Diagnosis: I10 (ICD-10-CM) - Essential hypertension    E11.9 (ICD-10-CM) - Type 2 diabetes mellitus without complication, without long-term current use of insulin ([facility] [address]    E78.5 (ICD-10-CM) - Dyslipidemia       Referral Source: Andrew Bolds, * Start of Care ([facility]): 2020     In time:   2:00pm             Out time:   3:00pm   Total Treatment Time (min):   60     Gender: male Age: 48 y.o. Ht: 74 in Wt: 243.4  lb  kg   BMI: 31.3 (obesity class 1) BMR   Male  AF 1.4-1.5     Past Medical History:  Patient Active Problem List   Diagnosis Code    Obesity (BMI 30.0-34. 9) E66.9    Intertrigo L30.4    DM2 (diabetes mellitus, type 2) (HCC) E11.9    Dyslipidemia E78.5    Abnormal EKG R94.31    Hemorrhoids K64.9    Diverticulosis K57.90    Grade I diastolic dysfunction W99.3    Low back pain M54.5    Hearing deficit H91.90    Tinnitus of both ears H93.13    HTN (hypertension) I10    Headache R51.9        Pertinent Medications:     Current Outpatient Medications:     candesartan (ATACAND) 16 mg tablet, Take 1 Tab by mouth daily. , Disp: 30 Tab, Rfl: 5    verapamil ER (VERELAN) 180 mg CR capsule, Take 1 Cap by mouth nightly., Disp: 30 Cap, Rfl: 5    butalbital-acetaminophen-caffeine (FIORICET, ESGIC) -40 mg per tablet, Take 1 Tab by mouth every four (4) hours as needed for Headache., Disp: 60 Tab, Rfl: 2    lidocaine (Lidoderm) 5 %, Apply patch to the affected area for 12 hours a day and remove for 12 hours a day., Disp: 5 Each, Rfl: 0    atorvastatin (LIPITOR) 10 mg tablet, Take 1 Tab by mouth daily. , Disp: 30 Tab, Rfl: 11    metFORMIN (GLUCOPHAGE) 500 mg tablet, Take 1 tablet daily with dinner, Disp: 90 Tab, Rfl: 3    albuterol (PROVENTIL HFA, VENTOLIN HFA, PROAIR HFA) 90 mcg/actuation inhaler, Take 2 Puffs by inhalation every four (4) hours as needed for Wheezing., Disp: 1 Inhaler, Rfl: 11    acetaminophen (TYLENOL) 500 mg tablet, Take 2 Tabs by mouth every six (6) hours as needed for Pain or Fever., Disp: 30 Tab, Rfl: 0    ibuprofen (MOTRIN) 600 mg tablet, Take 1 Tab by mouth every six (6) hours as needed for Pain (fever). , Disp: 20 Tab, Rfl: 0    clotrimazole-betamethasone (LOTRISONE) topical cream, Apply  to affected area two (2) times a day., Disp: 45 g, Rfl: 11    Blood-Glucose Meter (ONETOUCH VERIO FLEX) misc, Used to test blood sugars 3 times daily, Disp: 1 Each, Rfl: 0    glucose blood VI test strips (ONETOUCH VERIO) strip, Used to test blood sugar 3 times daily. , Disp: 100 Strip, Rfl: 11     Biochemical Data:   Lab Results   Component Value Date/Time    Hemoglobin A1c 6.5 (H) 09/25/2020 11:30 AM     Lab Results   Component Value Date/Time    Sodium 137 11/09/2020 04:32 PM    Potassium 4.2 11/09/2020 04:32 PM    Chloride 102 11/09/2020 04:32 PM    CO2 26 11/09/2020 04:32 PM    Anion gap 9 11/09/2020 04:32 PM    Glucose 108 (H) 11/09/2020 04:32 PM    BUN 15 11/09/2020 04:32 PM    Creatinine 1.30 11/09/2020 04:32 PM    BUN/Creatinine ratio 12 11/09/2020 04:32 PM    GFR est AA >60 11/09/2020 04:32 PM    GFR est non-AA 58 (L) 11/09/2020 04:32 PM    Calcium 9.7 11/09/2020 04:32 PM    Bilirubin, total 0.9 11/09/2020 04:32 PM    Alk.  phosphatase 99 11/09/2020 04:32 PM    Protein, total 8.8 (H) 11/09/2020 04:32 PM    Albumin 4.3 11/09/2020 04:32 PM    Globulin 4.5 (H) 11/09/2020 04:32 PM    A-G Ratio 1.0 (L) 11/09/2020 04:32 PM    ALT (SGPT) 68 11/09/2020 04:32 PM    AST (SGOT) 24 11/09/2020 04:32 PM     Lab Results   Component Value Date/Time    Cholesterol, total 165 06/09/2020 02:37 PM    HDL Cholesterol 50 06/09/2020 02:37 PM    LDL, calculated 42 06/09/2020 02:37 PM    VLDL, calculated 73 (H) 06/09/2020 02:37 PM    Triglyceride 363 (H) 06/09/2020 02:37 PM    CHOL/HDL Ratio 3.2 10/26/2018 10:20 AM     BP Readings from Last 3 Encounters:   11/30/20 124/80   11/20/20 116/77   11/12/20 123/82        Assessment:   Pt is a 54yo male here today for help with weight loss. daignoased with diabets about 1 year ago. He has not attended DSMT classes as he missed his scheduled class last year. He is still interested in attending if possible. Pt made changes after being diagnosed to cut back on some foods he thought we not good for diabetes. Describes past diet as lots of sweets and starchy foods. Current A1C = 6.5% down from 6.7% previously. Made changes to increase activity 3 months ago for purpose of weight loss. Currently walking 3-6 days per week but notes no changes in weight from activity alone. Made additional changes about 3 weeks ago when issues around HTN started. Since making changes to reduce sodium intake and reduce overall food intake he has seen weight loss and his headaches have improved. Notable history of elevated cholsterol (Triglycerides and VLDL). 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 3 months ago used to play soccer. Planning on restarting soccer once loosing a little bit of weight. 3 days per week for 1-1.5hrs. Personal weight goal: 200#     Food & Nutrition: Originally from [place]. Wife is [place]. Not eating the same foods as the restof the house. Family is supportive. Family may make sweets but he is not eating it anymore for at least 3-6 months. Used to drink soda. Not for 1 year now. Now less juice made at home. Made changes to diet aboug 3 weeks ago to reduce amount of food consumed by cutting off eating earlier in the night and change lunch/snack options. Sometimes feeling weak from not eating enough.  Wants to add variety. Changes: not adding salt, not using dressing, ending eating earlier,     Daily salad, oatmeal + raisins and nuts with skim milk instead of 2%  sometimes chicken, salmon, lamb  Used to salt salad. No longer adding salt to anything. B- oatmeal (2/3 cup + water + 1 tbsp raisins + 1 tbsp cashews) small amount skim milk , water  S- veggie chips( fried) 1/4 of container per day)  L- chicken (10-12oz boiled and oven with pepper sometimes with olive oil - cooks whole chicken then pulls from it. No skin) or salmon or lamb + salad (mixed lettuce, no longer adding vinegar or salt. Sometimes oilive oil, tomatoes, cucumbers, carrots)   S- nothing. Used to eat mangos, bananas, and oranges, but stopped last week. Not intentionally. D- 7:30pm - green tea (lemon and honey sometimes 1 time per week) + cashews (unsalted) 1/8th cup oR yogurt (chobani fruit on bottom or light)  No dinner with family for past 3 weeks. Useed to eat 3 times per day. Felt after eating he needed to stay awake or sleeping poorly. Going to bed earlier now. S- none  Drinks: water, green tea     Estimate Needs:    Equation( [x] MSJ ; []  HBE; [] Robert William; [] other)  * Activity Factor (1.4-1.5) -500   Calories: 7065-4289  Protein:  Carbs: 270-300 Fat: 80   Kcal/day  g/day  g/day  g/day        percent: 25  45  30               Nutrition Diagnosis Food and nutrition related knowledge deficit R/T lack of prior education for healthy weight loss and diabetes AEB request for counseling    Inadequate energy intake R/T changes made to diet for weight loss and fears around foods related to diabetes AEB diet recall showing calories consumed below recommendation and pt report of being unsure of what to eat and feeling weak and hungry in afternoons and at night. Nutrition Intervention &  Education: Educated pt on the pathophysiology of Type II Diabetes, insulin resistance and the rationale for dietary modifications and increased activity. Educated pt on lean proteins, healthy fats, non-starchy vegetables, and complex carbohydrate food sources. Discussed limiting carbohydrates, meal timing, and appropriate serving sizes. Provided support for changes made to reduce sodium and food quantity. Recommended increasing consistency of carbohydrates during the day to support energy and continued dietary changes. Handouts Provided: []  Carbohydrates  []  Protein  [x]  Non-starchy Vegatbles  []  Food Label  [x]  Meal and Snack Ideas (balanced plate)  []  Food Journals []  Diabetes  []  Cholesterol  [x]  Sodium  []  Gen Nutr Guidelines  []  SBGM Guidelines  []  Others:   Information Reviewed with: pt   Readiness to Change Stage: []  Pre-contemplative    []  Contemplative  []  Preparation               [x]  Action                  []  Maintenance   Potential Barriers to Learning: []  Decline in memory    []  Language barrier   []  Other:  [x]  Emotional  (fear of certain foods related to diabetes)    []  Limited mobility     []  None []  Lack of motivation     [] Vision, hearing or cognitive impairment   Expected Compliance: Good  Due to changes already made     Nutritional Goal - To promote lifestyle changes to result in:    [x]  Weight loss  [x]  Improved diabetic control  []  Decreased cholesterol levels  [x]  Decreased blood pressure  []  Weight maintenance []  Preventing any interactions associated with food allergies  []  Adequate weight gain toward goal weight  []  Other:        Patient Goals:   - Improve consistency of CHO intake w/goal to plan meals with 1 fist CHO/meal and 1-2 optional snack (ideas provided)  - Improve physical activity w/goal to continue walking for 45-60 minutes 3-6 times per week. Add in strength exercises at home 2 days per week. No time requirement. Increase as able.   - continue reduction in sodium by not adding salt to foods and not eating out  -increase total calorie intake at dinner meal by including a protein, complex carb, and vegetable daily (no amount minimum)  -add in optional snack at 3pm (list provided) if feeling weak/hungry     Dietitian Signature: Judge aCrie MS, CLARISSA, CSSD Date: 12/9/2020   Follow-up: 4 weeks Time: 2:04 PM

## 2021-01-06 ENCOUNTER — HOSPITAL ENCOUNTER (OUTPATIENT)
Dept: NUTRITION | Age: 51
Discharge: HOME | End: 2021-01-06
Payer: MEDICAID

## 2021-01-06 DIAGNOSIS — E11.9 TYPE 2 DIABETES MELLITUS WITHOUT COMPLICATION, WITHOUT LONG-TERM CURRENT USE OF INSULIN (HCC): ICD-10-CM

## 2021-01-06 PROCEDURE — 97803 MED NUTRITION INDIV SUBSEQ: CPT | Performed by: DIETITIAN, REGISTERED

## 2021-01-06 NOTE — PROGRESS NOTES
NUTRITION  FOLLOW-UP TREATMENT NOTE  Patient Name: Radha Smith         Date: 2021  : 1970    YES Patient  Verified  Diagnosis:  I10 (ICD-10-CM) - Essential hypertension    E11.9 (ICD-10-CM) - Type 2 diabetes mellitus without complication, without long-term current use of insulin (HCC)    E78.5 (ICD-10-CM) - Dyslipidemia       In time:  11:30am            Out time:   12:00pm   Total Treatment Time (min):   30     SUBJECTIVE/ASSESSMENT  Current Wt: 241.6 Previous Wt: 243.4  Wt Change: -1.8     Initial Wt: 243.4  Total Wt change: -1.8 Height: 74     Changes in medication or medical history? Any new allergies, surgeries or procedures? NO    If yes, update Summary List        Nutrition Diagnosis        Diagnosis Status:           []  Improved []  No Change    []  Declined   []  Discontinued        Nutrition Monitoring and Evaluation: Pt made changes as directed. Now eating 3 meals per day. Small portions. Using fist for potion size. Notes hunger between meals and adding in snacks. Feels he needs a stricter diet for faster weight loss. Pt and family out of town for 6 days in [place]. Notes trying to choose best options available but eating out all meals. Likely high sodium intake and higher calorie intake from eating out. SMBG = 2 days ago = 120mg/dl, 119mg/dl  Checking in morning usually  After dinner = 136mg/dl (2 hours after)    Previous goals:  - Improve consistency of CHO intake w/goal to plan meals with 1 fist CHO/meal and 1-2 optional snack (ideas provided)  Met. Exceeded. Walking daily. - Improve physical activity w/goal to continue walking for 45-60 minutes 3-6 times per week. Added in strength training (bike) -Add in strength exercises at home 2 days per week. No time requirement. Increase as able.   Met. - continue reduction in sodium by not adding salt to foods and not eating out  Met. -increase total calorie intake at dinner meal by including a protein, complex carb, and vegetable daily (no amount minimum)  Met. -add in optional snack at 3pm (list provided) if feeling weak/hungry     Nutrition Prescription and  Intervention Provided snack ideas for between meals. Answered questions about carbohydrate amounts of common foods eaten. Based on pt desire to reduce intake further, suggested removing 1 of his 3 snacks a few days per week. Increased exercise goals.       Patient Education:  [x]  Review current plan with patient   []  Other:    Handouts/  Information Provided: []  Carbohydrates  []  Protein  []  Fiber  []  Serving Sizes  []  Fluids  []  General guidelines []  Diabetes  []  Cholesterol  []  Sodium  []  SBGM  []  Food Journals  []  Others:      Patient Goals Exercise: continue daily 2 mile walk and increase strength exercises to 3 times per week (add in lunges and squats)  Limit eating out to 1 time per week to reduce sodium intake  Limit to 2 dates at night instead of 3 with nuts  Can decrease snacks during the day to 2 instead of 3 a few days per week to reduce total calorie intake     PLAN  [x]  Continue on current plan []  Follow-up PRN   []  Discharge due to :    [x]  Next appt: 4 weeks     Dietitian: Carito Smart MS, RD, CSSD    Date: 1/6/2021 Time: 11:25 AM

## 2021-02-05 ENCOUNTER — HOSPITAL ENCOUNTER (OUTPATIENT)
Dept: NUTRITION | Age: 51
Discharge: HOME | End: 2021-02-05
Payer: MEDICAID

## 2021-02-05 DIAGNOSIS — E78.5 DYSLIPIDEMIA: ICD-10-CM

## 2021-02-05 DIAGNOSIS — I10 ESSENTIAL HYPERTENSION: ICD-10-CM

## 2021-02-05 DIAGNOSIS — E66.9 OBESITY (BMI 30.0-34.9): ICD-10-CM

## 2021-02-05 DIAGNOSIS — E11.9 TYPE 2 DIABETES MELLITUS WITHOUT COMPLICATION, WITHOUT LONG-TERM CURRENT USE OF INSULIN (HCC): ICD-10-CM

## 2021-02-05 PROCEDURE — 97803 MED NUTRITION INDIV SUBSEQ: CPT | Performed by: DIETITIAN, REGISTERED

## 2021-02-05 NOTE — PROGRESS NOTES
NUTRITION  FOLLOW-UP TREATMENT NOTE  Patient Name: Rgahu Batista         Date: 2021  : 1970    YES Patient  Verified  Diagnosis:  I10 (ICD-10-CM) - Essential hypertension    E11.9 (ICD-10-CM) - Type 2 diabetes mellitus without complication, without long-term current use of insulin (HCC)    E78.5 (ICD-10-CM) - Dyslipidemia       In time:  11:30am            Out time:   12:00pm   Total Treatment Time (min):   30     SUBJECTIVE/ASSESSMENT  Current Wt: 245 Previous Wt: 241. 6 Wt Change: +3.4     Initial Wt: 243.4  Total Wt change: +1.6 Height: 74     Changes in medication or medical history? Any new allergies, surgeries or procedures? NO    If yes, update Summary List        Nutrition Diagnosis        Diagnosis Status: Food and nutrition related knowledge deficit R/T lack of prior education for healthy weight loss and diabetes AEB request for counseling     [x]  Improved []  No Change    []  Declined   []  Discontinued       Inadequate energy intake R/T changes made to diet for weight loss and fears around foods related to diabetes AEB diet recall showing calories consumed below recommendation and pt report of being unsure of what to eat and feeling weak and hungry in afternoons and at night. [x]  Improved []  No Change    []  Declined   []  Discontinued        Nutrition Monitoring and Evaluation: Only eating out 2 times in past 2 weeks. Consistent 3 meals per day. Feeling more energy. Decreased to 2 snacks per day instead of 3. Paying attention to food choices when eating out and preparing food at home. Not looking up nutrition for restaurants ahead of time. Notes less activity recently as weather has made walking difficult. He wants to start at a gym facility next week.       SMBG = 125mg/dl yesterday before eating  Checking in morning usually  Checked 4 after eating breakfast 1 day = 130mg/dl  Meal prior: beans (2 cups = 80g cho) + wheat bread (1 slice big bread = 77B cho) = 100g cho    Previous goals:  Exercise: continue daily 2 mile walk and increase strength exercises to 3 times per week (add in lunges and squats)  Limit eating out to 1 time per week to reduce sodium intake  Limit to 2 dates at night instead of 3 with nuts  Can decrease snacks during the day to 2 instead of 3 a few days per week to reduce total calorie intake     Nutrition Prescription and  Intervention Reviewed carbohydrate sources within cultural food practices. Reviewed balanced diabetes plate. Reviewed exercise goals to work towards 200min per week (cardio, strength, and flexibility). Looked up nutrition information for restaurants together to identify sodium, carbohydrate and calorie amounts. Discussed option for adjustment. Patient Education:  [x]  Review current plan with patient   []  Other:    Handouts/  Information Provided: []  Carbohydrates  []  Protein  []  Fiber  []  Serving Sizes  []  Fluids  []  General guidelines [x]  Diabetes plate  []  Cholesterol  []  Sodium  []  SBGM  []  Food Journals  []  Others:      Patient Goals Exercise: start gym 2-3 days per week next week OR walk and strength exercises on own 2-3 times per week. Limit eating out to 1 time per week to reduce sodium intake  Decrease total carbohydrates and calories at breakfast by changing to 1 cup beans, 1 egg, and 1 slice of toast.   -use balanced plate provided to help reduce portions at meals  -look up nutrition facts for restaurant foods on [employer]       PLAN  [x]  Continue on current plan []  Follow-up PRN   []  Discharge due to :    [x]  Next appt: 4 weeks     Dietitian: Poly Fam MS, CLARISSA, CSSD    Date: 2/5/2021 Time: 2:25pm

## 2021-02-09 RX ORDER — LOSARTAN POTASSIUM 100 MG/1
100 TABLET ORAL DAILY
Qty: 30 TAB | Refills: 11 | Status: SHIPPED | OUTPATIENT
Start: 2021-02-09 | End: 2021-02-12 | Stop reason: SDUPTHER

## 2021-02-09 RX ORDER — DILTIAZEM HYDROCHLORIDE 180 MG/1
180 CAPSULE, COATED, EXTENDED RELEASE ORAL DAILY
Qty: 30 CAP | Refills: 11 | Status: SHIPPED | OUTPATIENT
Start: 2021-02-09 | End: 2021-02-12 | Stop reason: SDUPTHER

## 2021-02-12 ENCOUNTER — OFFICE VISIT (OUTPATIENT)
Dept: INTERNAL MEDICINE CLINIC | Age: 51
End: 2021-02-12
Payer: MEDICAID

## 2021-02-12 VITALS
RESPIRATION RATE: 18 BRPM | TEMPERATURE: 98.4 F | HEART RATE: 77 BPM | SYSTOLIC BLOOD PRESSURE: 128 MMHG | BODY MASS INDEX: 32.1 KG/M2 | WEIGHT: 250.1 LBS | OXYGEN SATURATION: 96 % | HEIGHT: 74 IN | DIASTOLIC BLOOD PRESSURE: 85 MMHG

## 2021-02-12 DIAGNOSIS — E66.9 OBESITY (BMI 30.0-34.9): ICD-10-CM

## 2021-02-12 DIAGNOSIS — E78.5 DYSLIPIDEMIA: ICD-10-CM

## 2021-02-12 DIAGNOSIS — I10 ESSENTIAL HYPERTENSION: ICD-10-CM

## 2021-02-12 DIAGNOSIS — G43.009 MIGRAINE WITHOUT AURA AND WITHOUT STATUS MIGRAINOSUS, NOT INTRACTABLE: Primary | ICD-10-CM

## 2021-02-12 DIAGNOSIS — E11.9 TYPE 2 DIABETES MELLITUS WITHOUT COMPLICATION, WITHOUT LONG-TERM CURRENT USE OF INSULIN (HCC): ICD-10-CM

## 2021-02-12 DIAGNOSIS — I51.89 GRADE I DIASTOLIC DYSFUNCTION: ICD-10-CM

## 2021-02-12 LAB
ALBUMIN SERPL-MCNC: 3.8 G/DL (ref 3.5–5)
ANION GAP SERPL CALC-SCNC: 6 MMOL/L (ref 5–15)
BUN SERPL-MCNC: 15 MG/DL (ref 6–20)
BUN/CREAT SERPL: 14 (ref 12–20)
CALCIUM SERPL-MCNC: 8.8 MG/DL (ref 8.5–10.1)
CHLORIDE SERPL-SCNC: 107 MMOL/L (ref 97–108)
CO2 SERPL-SCNC: 25 MMOL/L (ref 21–32)
CREAT SERPL-MCNC: 1.09 MG/DL (ref 0.7–1.3)
EST. AVERAGE GLUCOSE BLD GHB EST-MCNC: 123 MG/DL
GLUCOSE SERPL-MCNC: 107 MG/DL (ref 65–100)
HBA1C MFR BLD: 5.9 % (ref 4–5.6)
PHOSPHATE SERPL-MCNC: 3.2 MG/DL (ref 2.6–4.7)
POTASSIUM SERPL-SCNC: 4.4 MMOL/L (ref 3.5–5.1)
SODIUM SERPL-SCNC: 138 MMOL/L (ref 136–145)

## 2021-02-12 PROCEDURE — 36415 COLL VENOUS BLD VENIPUNCTURE: CPT | Performed by: INTERNAL MEDICINE

## 2021-02-12 PROCEDURE — 99214 OFFICE O/P EST MOD 30 MIN: CPT | Performed by: INTERNAL MEDICINE

## 2021-02-12 RX ORDER — LOSARTAN POTASSIUM 100 MG/1
100 TABLET ORAL DAILY
Qty: 90 TAB | Refills: 3 | Status: SHIPPED | OUTPATIENT
Start: 2021-02-12 | End: 2022-05-11 | Stop reason: SDUPTHER

## 2021-02-12 RX ORDER — DILTIAZEM HYDROCHLORIDE 180 MG/1
180 CAPSULE, COATED, EXTENDED RELEASE ORAL DAILY
Qty: 90 CAP | Refills: 3 | Status: SHIPPED | OUTPATIENT
Start: 2021-02-12 | End: 2022-04-04

## 2021-02-12 NOTE — PROGRESS NOTES
SP[facility]TS [facility]INE [facility]  Mary Guajardo MD, [address] [address]  Phone:  [phone]  Fax: [phone]       Chief Complaint   Patient presents with    Hypertension   . SUBJECTIVE:    Erin Kim is a 46 y.o. male Patient returns today after a consultation with Che Covington DO on 11/30, where she found migraine without aura without status migrainous, not intractable, and primary hypertension, neurological exam was non focal and she is requesting MRI and recommended continuation of Verapamil to assist with both hypertension and migraine prophylaxis. He was also cautioned against overuse of analgesics for rescue headache management. MRI was then performed on 12/04/20 and was unremarkable. Other medical problems include primary hypertension, type 2 diabetes, diastolic dysfunction, dyslipidemia, obesity, and is seen for evaluation. 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 Patient is seen for evaluation. He is disgusted with insurance, they keep changing formularies, so he has to keep changing medications, and patient is seen for evaluation. Current Outpatient Medications   Medication Sig Dispense Refill    losartan (COZAAR) 100 mg tablet Take 1 Tab by mouth daily. 90 Tab 3    dilTIAZem ER (CARDIZEM CD) 180 mg capsule Take 1 Cap by mouth daily. 90 Cap 3    butalbital-acetaminophen-caffeine (FIORICET, ESGIC) -40 mg per tablet Take 1 Tab by mouth every four (4) hours as needed for Headache. 60 Tab 2    lidocaine (Lidoderm) 5 % Apply patch to the affected area for 12 hours a day and remove for 12 hours a day. 5 Each 0    atorvastatin (LIPITOR) 10 mg tablet Take 1 Tab by mouth daily.  30 Tab 11    metFORMIN (GLUCOPHAGE) 500 mg tablet Take 1 tablet daily with dinner 90 Tab 3    albuterol (PROVENTIL HFA, VENTOLIN HFA, PROAIR HFA) 90 mcg/actuation inhaler Take 2 Puffs by inhalation every four (4) hours as needed for Wheezing. 1 Inhaler 11    acetaminophen (TYLENOL) 500 mg tablet Take 2 Tabs by mouth every six (6) hours as needed for Pain or Fever. 30 Tab 0    ibuprofen (MOTRIN) 600 mg tablet Take 1 Tab by mouth every six (6) hours as needed for Pain (fever). 20 Tab 0    clotrimazole-betamethasone (LOTRISONE) topical cream Apply  to affected area two (2) times a day. 45 g 11    Blood-Glucose Meter (ONETOUCH VERIO FLEX) misc Used to test blood sugars 3 times daily 1 Each 0    glucose blood VI test strips (ONETOUCH VERIO) strip Used to test blood sugar 3 times daily. 100 Strip 11     Past Medical History:   Diagnosis Date    Abnormal EKG 05/17/2019    Diverticulosis 06/04/2019    DM2 [facility]diabetes mellitus, type 2) ([facility] 05/17/2019    Dyslipidemia     Grade I diastolic dysfunction 34/15/9935    echo    Headache 11/06/2020    Hemorrhoids 06/04/2019    HTN (hypertension)     Low back pain     Migraine without aura and without status migrainosus, not intractable 11/30/2020    Rowena Williamson DO - 12/4/20 - Normal MRI of the brain    Obesity (BMI 30.0-34. 9)     Obesity (BMI 30.0-34. 9)     Preventative health care     S/P colonoscopy 06/28/2019    md viry -10 yrs -hemorrhoids,tics    Tinnitus of both ears      Past Surgical History:   Procedure Laterality Date    COLONOSCOPY N/A 6/28/2019    COLONOSCOPY performed by Krystyna Quinonez MD at \[facility]\"" ENDOSCOPY     No Known Allergies      REVIEW OF SYSTEMS:  General: negative for - chills or fever  ENT: negative for - headaches, nasal congestion or tinnitus  Respiratory: negative for - cough, hemoptysis, shortness of breath or wheezing  Cardiovascular : negative for - chest pain, edema, palpitations or shortness of breath  Gastrointestinal: negative for - abdominal pain, blood in stools, heartburn or nausea/vomiting  Genito-Urinary: no dysuria, trouble voiding, or hematuria  Musculoskeletal: negative for - gait disturbance, joint pain, joint stiffness or joint swelling  Neurological: no TIA or stroke symptoms  Hematologic: no bruises, no bleeding, no swollen glands  Integument: no lumps, mole changes, nail changes or rash  Endocrine: no malaise/lethargy or unexpected weight changes      Social History     Socioeconomic History    Marital status:      Spouse name: Not on file    Number of children: Not on file    Years of education: Not on file    Highest education level: Not on file   Tobacco Use    Smoking status: Never Smoker    Smokeless tobacco: Never Used   Substance and Sexual Activity    Alcohol use: Never     Frequency: Never    Drug use: Never    Sexual activity: Yes     Partners: Female     Birth control/protection: None   Social History Narrative    Habits:  He is a lifetime nonsmoker, non drinker, non drug abuser.         Social History:  The patient is , lives with his wife. He was born in [place]. He has been in the [address] [place] [address],[address] since 2005. They have three children, two daughters ages 9 and 11, and a son age 3. He completed his ALIA at [employer]. He was in [employer] for five years. 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 Faith background is [employer].         Family History:  Father 76, alive and well. Mother 67 with diabetes. Six brothers and four sisters are alive and well. Family History   Problem Relation Age of Onset    Diabetes Mother        OBJECTIVE:    Visit Vitals  /85   Pulse 77   Temp 98.4 °F (36.9 °C) (Oral)   Resp 18   Ht 6' 2\" (1.88 m)   Wt 250 lb 1.6 oz (113.4 kg)   SpO2 96%   BMI 32.11 kg/m²     CONSTITUTIONAL: well , well nourished, appears age appropriate  EYES: perrla, eom intact  ENMT:moist mucous membranes, pharynx clear  NECK: supple.  Thyroid normal  RESPIRATORY: Chest: clear bilaterally   CARDIOVASCULAR: Heart: regular rate and rhythm  GASTROINTESTINAL: Abdomen: soft, bowel sounds active  HEMATOLOGIC: no pathological lymph nodes palpated  MUSCULOSKELETAL: Extremities: no edema, pulse 1+   INTEGUMENT: No unusual rashes or suspicious skin lesions noted. Nails appear normal.  NEUROLOGIC: non-focal exam   MENTAL STATUS: alert and oriented, appropriate affect           ASSESSMENT:  1. Migraine without aura and without status migrainosus, not intractable    2. Essential hypertension    3. Type 2 diabetes mellitus without complication, without long-term current use of insulin ([address]    4. Grade I diastolic dysfunction    5. Dyslipidemia    6. Obesity (BMI 30.0-34. 9)      For migraine prophylaxis he is now on Diltiazem instead of Verapamil because of insurance issues. He seems to be doing well with the new medication. Blood pressure control is at goal.    He has diabetes and last hemoglobin A1c was acceptable at 6.5. We will assess control today with a hemoglobin A1c. History of grade 1 diastolic dysfunction with no symptoms. Up until two weeks ago he was working out on a regular basis, but with the snow and cold weather, he is doing it maybe three times a week. History of dyslipidemia with LDL in June of 73, well into goal range. Triglycerides are still high, however. Obesity remains a concern and we encourage a heart healthy, weight reducing, diabetic diet. He will be back to see us in three to four months, sooner if needed. I have discussed the diagnosis with the patient and the intended plan as seen in the  Orders. The patient understands and agees with the plan. The patient has   received an after visit summary and questions were answered concerning  future plans  Patient labs and/or xrays were reviewed  Past records were reviewed.     PLAN:  .  Orders Placed This Encounter    RENAL FUNCTION PANEL    HEMOGLOBIN A1C WITH EAG    losartan (COZAAR) 100 mg tablet    dilTIAZem ER (CARDIZEM CD) 180 mg capsule       Follow-up and Dispositions · Return in about 4 months (around 6/12/2021). ATTENTION:   This medical record was transcribed using an electronic medical records system. Although proofread, it may and can contain electronic and spelling errors. Other human spelling and other errors may be present. Corrections may be executed at a later time. Please feel free to contact us for any clarifications as needed.

## 2021-02-12 NOTE — PROGRESS NOTES
1. Have you been to the ER, urgent care clinic since your last visit? Hospitalized since your last visit? No    2. Have you seen or consulted any other health care providers outside of the [address] [place] [address] since your last visit? Include any pap smears or colon screening.  No     Wants to discuss BP

## 2021-03-05 ENCOUNTER — HOSPITAL ENCOUNTER (OUTPATIENT)
Dept: NUTRITION | Age: 51
Discharge: HOME | End: 2021-03-05
Payer: MEDICAID

## 2021-03-05 DIAGNOSIS — E11.9 TYPE 2 DIABETES MELLITUS WITHOUT COMPLICATION, WITHOUT LONG-TERM CURRENT USE OF INSULIN (HCC): ICD-10-CM

## 2021-03-05 DIAGNOSIS — E66.9 OBESITY (BMI 30.0-34.9): ICD-10-CM

## 2021-03-05 DIAGNOSIS — I10 ESSENTIAL HYPERTENSION: ICD-10-CM

## 2021-03-05 PROCEDURE — 97803 MED NUTRITION INDIV SUBSEQ: CPT | Performed by: DIETITIAN, REGISTERED

## 2021-03-05 NOTE — PROGRESS NOTES
NUTRITION  FOLLOW-UP TREATMENT NOTE  Patient Name: Morenita Cabrera         Date: 3/5/2021  : 1970    YES Patient  Verified  Diagnosis:  I10 (ICD-10-CM) - Essential hypertension    E11.9 (ICD-10-CM) - Type 2 diabetes mellitus without complication, without long-term current use of insulin (HCC)    E78.5 (ICD-10-CM) - Dyslipidemia       In time:  9:00am            Out time:   9:30am   Total Treatment Time (min):   30     SUBJECTIVE/ASSESSMENT  Current Wt: 241.0 Previous Wt: 245 Wt Change: -4     Initial Wt: 243.4  Total Wt change: -2.4 Height: 74     Changes in medication or medical history? Any new allergies, surgeries or procedures? NO    If yes, update Summary List        Nutrition Diagnosis        Diagnosis Status: Food and nutrition related knowledge deficit R/T lack of prior education for healthy weight loss and diabetes AEB request for counseling     [x]  Improved []  No Change    []  Declined   []  Discontinued       Inadequate energy intake R/T changes made to diet for weight loss and fears around foods related to diabetes AEB diet recall showing calories consumed below recommendation and pt report of being unsure of what to eat and feeling weak and hungry in afternoons and at night. [x]  Improved []  No Change    []  Declined   []  Discontinued        Nutrition Monitoring and Evaluation: Breakfast - beans (2 cups), small bread,   OR eggs and 2 toast (notes less energy on these days)  Or oatmeal with nuts and small amount of raisins  Notable he feels better with 60g cho at meals. Now working out with  here 3 days per week. Notes he is pushing much harder than he was  On own at home. Labs reviewed.    Lab Results   Component Value Date/Time    Hemoglobin A1c 5.9 (H) 2021 10:32 AM     Lab Results   Component Value Date/Time    GFR est AA >60 2021 10:32 AM    GFR est non-AA >60 2021 10:32 AM    Creatinine 1.09 2021 10:32 AM    BUN 15 2021 10:32 AM Sodium 138 02/12/2021 10:32 AM    Potassium 4.4 02/12/2021 10:32 AM    Chloride 107 02/12/2021 10:32 AM    CO2 25 02/12/2021 10:32 AM     Previous goals:  Met. Exercise: start gym 2-3 days per week next week OR walk and strength exercises on own 2-3 times per week. Met. Limit eating out to 1 time per week to reduce sodium intake  Decrease total carbohydrates and calories at breakfast by changing to 1 cup beans, 1 egg, and 1 slice of toast.   Improved. Still expresses some food fears. -use balanced plate provided to help reduce portions at meals  Not used. Less eating out. -look up nutrition facts for restaurant foods on [employer]     Nutrition Prescription and  Intervention Reviewed carbohydrate sources within cultural food practices. Reviewed balanced diabetes plate. Reviewed exercise goals to work towards 200min per week (cardio, strength, and flexibility). Reviewed healthier eating out    Created balanced plate for salads to make at home. Discussed Ramadan holiday coming up. Suggested keeping a snack and his meter with him in case he feels funny during the day to be able to check for low blood sugar. Reviewed how to treat a low blood sugar. Patient Education:  [x]  Review current plan with patient   []  Other:    Handouts/  Information Provided: []  Carbohydrates  []  Protein  []  Fiber  []  Serving Sizes  []  Fluids  []  General guidelines [x]  Diabetes plate  []  Cholesterol  []  Sodium  []  SBGM  []  Food Journals  []  Others:      Patient Goals -Exercise: continue gym 3 days per week  -continue to Limit eating out to 1 time per week to reduce sodium intake  -Decrease total carbohydrates and calories at breakfast by changing to 1 cup beans, 1 egg, and 1 slice of toast. OR 2 cups beans + egg OR oatmeal + nuts  -use balanced plate provided to help reduce portions at meals (salads plate provided today)  -look up nutrition facts for restaurant foods on [employer]  -eat before workouts 3 days per week (15-20g suggested to start- see how you feel)     PLAN  [x]  Continue on current plan []  Follow-up PRN   []  Discharge due to :    [x]  Next appt: 4 weeks     Dietitian: Carmen Marshall MS, RD, CSSD    Date: 3/5/2021 Time: 2:25pm

## 2021-04-02 ENCOUNTER — HOSPITAL ENCOUNTER (EMERGENCY)
Age: 51
Discharge: HOME | End: 2021-04-02
Attending: EMERGENCY MEDICINE | Admitting: EMERGENCY MEDICINE
Payer: MEDICAID

## 2021-04-02 ENCOUNTER — APPOINTMENT (OUTPATIENT)
Dept: CT IMAGING | Age: 51
End: 2021-04-02
Attending: EMERGENCY MEDICINE
Payer: MEDICAID

## 2021-04-02 VITALS
SYSTOLIC BLOOD PRESSURE: 177 MMHG | TEMPERATURE: 98.1 F | RESPIRATION RATE: 16 BRPM | HEART RATE: 77 BPM | OXYGEN SATURATION: 99 % | DIASTOLIC BLOOD PRESSURE: 102 MMHG

## 2021-04-02 DIAGNOSIS — J34.0 NASAL FURUNCLE: Primary | ICD-10-CM

## 2021-04-02 PROCEDURE — 99283 EMERGENCY DEPT VISIT LOW MDM: CPT

## 2021-04-02 PROCEDURE — 70450 CT HEAD/BRAIN W/O DYE: CPT

## 2021-04-02 PROCEDURE — 74011250637 HC RX REV CODE- 250/637: Performed by: EMERGENCY MEDICINE

## 2021-04-02 RX ORDER — CLINDAMYCIN HYDROCHLORIDE 300 MG/1
300 CAPSULE ORAL 3 TIMES DAILY
Qty: 30 CAP | Refills: 0 | Status: SHIPPED | OUTPATIENT
Start: 2021-04-02 | End: 2021-04-02 | Stop reason: SDUPTHER

## 2021-04-02 RX ORDER — CLINDAMYCIN HYDROCHLORIDE 150 MG/1
300 CAPSULE ORAL
Status: COMPLETED | OUTPATIENT
Start: 2021-04-02 | End: 2021-04-02

## 2021-04-02 RX ORDER — BACITRACIN 500 [USP'U]/G
OINTMENT TOPICAL 3 TIMES DAILY
Qty: 1 TUBE | Refills: 0 | Status: SHIPPED | OUTPATIENT
Start: 2021-04-02 | End: 2021-04-12

## 2021-04-02 RX ORDER — CLINDAMYCIN HYDROCHLORIDE 300 MG/1
300 CAPSULE ORAL 3 TIMES DAILY
Qty: 30 CAP | Refills: 0 | Status: SHIPPED | OUTPATIENT
Start: 2021-04-02 | End: 2021-04-12

## 2021-04-02 RX ORDER — BACITRACIN 500 [USP'U]/G
OINTMENT TOPICAL 3 TIMES DAILY
Qty: 1 TUBE | Refills: 0 | Status: SHIPPED | OUTPATIENT
Start: 2021-04-02 | End: 2021-04-02 | Stop reason: SDUPTHER

## 2021-04-02 RX ADMIN — CLINDAMYCIN HYDROCHLORIDE 300 MG: 150 CAPSULE ORAL at 19:00

## 2021-04-02 NOTE — ED PROVIDER NOTES
Pt is a 47 yo male presenting with nasal pain. Reports he has noticed an area in his right nare that is painful. Started 2 days ago and now it hurts when he moves/scrunches his nose and when he blows or cleans his nose. Pt denies F/C/N/V/facial swelling. Had hx of abscess in his buttock region last year that was drained. Past Medical History:   Diagnosis Date    Abnormal EKG 05/17/2019    Diverticulosis 06/04/2019    DM2 (diabetes mellitus, type 2) ([facility] [address] 05/17/2019    Dyslipidemia     Grade I diastolic dysfunction 98/58/6714    echo    Headache 11/06/2020    Hemorrhoids 06/04/2019    HTN (hypertension)     Low back pain     Migraine without aura and without status migrainosus, not intractable 11/30/2020    Adair Daniel, DO - 12/4/20 - Normal MRI of the brain    Obesity (BMI 30.0-34. 9)     Obesity (BMI 30.0-34. 9)     Preventative health care     S/P colonoscopy 06/28/2019    md viry -10 yrs -hemorrhoids,tics    Tinnitus of both ears        Past Surgical History:   Procedure Laterality Date    COLONOSCOPY N/A 6/28/2019    COLONOSCOPY performed by Dayday Hurtado MD at [facility] ENDOSCOPY         Family History:   Problem Relation Age of Onset    Diabetes Mother        Social History     Socioeconomic History    Marital status:      Spouse name: Not on file    Number of children: Not on file    Years of education: Not on file    Highest education level: Not on file   Occupational History    Not on file   Social Needs    Financial resource strain: Not on file    Food insecurity     Worry: Not on file     Inability: Not on file    Transportation needs     Medical: Not on file     Non-medical: Not on file   Tobacco Use    Smoking status: Never Smoker    Smokeless tobacco: Never Used   Substance and Sexual Activity    Alcohol use: Never     Frequency: Never    Drug use: Never    Sexual activity: Yes     Partners: Female     Birth control/protection: None Lifestyle    Physical activity     Days per week: Not on file     Minutes per session: Not on file    Stress: Not on file   Relationships    Social connections     Talks on phone: Not on file     Gets together: Not on file     Attends Oriental orthodox service: Not on file     Active member of club or organization: Not on file     Attends meetings of clubs or organizations: Not on file     Relationship status: Not on file    Intimate partner violence     Fear of current or ex partner: Not on file     Emotionally abused: Not on file     Physically abused: Not on file     Forced sexual activity: Not on file   Other Topics Concern    Not on file   Social History Narrative    Habits:  He is a lifetime nonsmoker, non drinker, non drug abuser.         Social History:  The patient is , lives with his wife. He was born in [place]. He has been in the [address] [place] [address],[address] since 2005. They have three children, two daughters ages 9 and 11, and a son age 3. He completed his ALIA at [employer]. He was in [employer] for five years. 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         Family History:  Father 76, alive and well. Mother 67 with diabetes. Six brothers and four sisters are alive and well. ALLERGIES: Patient has no known allergies. Review of Systems   Constitutional: Negative for chills and fever. HENT: Negative for drooling and nosebleeds. Eyes: Negative for pain and itching. Respiratory: Negative for choking and stridor. Cardiovascular: Negative for leg swelling. Gastrointestinal: Negative for abdominal pain and rectal pain. Endocrine: Negative for heat intolerance and polyphagia. Genitourinary: Negative for enuresis and genital sores. Musculoskeletal: Negative for arthralgias and joint swelling. Skin: Positive for wound. Negative for color change. Allergic/Immunologic: Negative for immunocompromised state.    Neurological: Negative for tremors and speech difficulty. Hematological: Negative for adenopathy. Psychiatric/Behavioral: Negative for dysphoric mood and sleep disturbance. Vitals:    04/02/21 1634   BP: (!) 177/102   Pulse: 77   Resp: 16   Temp: 98.1 °F (36.7 °C)   SpO2: 99%            Physical Exam  Vitals signs and nursing note reviewed. Constitutional:       General: He is not in acute distress. Appearance: He is well-developed. He is not ill-appearing, toxic-appearing or diaphoretic. HENT:      Head: Normocephalic and atraumatic. Nose: No congestion or rhinorrhea. Comments: Furuncle in right vestibule. No large abscess/fluctuance. No cellulitis of nose. Mouth/Throat:      Mouth: Mucous membranes are moist.   Eyes:      Conjunctiva/sclera: Conjunctivae normal.   Neck:      Musculoskeletal: Normal range of motion and neck supple. No neck rigidity or muscular tenderness. Cardiovascular:      Rate and Rhythm: Normal rate and regular rhythm. Heart sounds: Normal heart sounds. Pulmonary:      Effort: Pulmonary effort is normal. No respiratory distress. Breath sounds: Normal breath sounds. Abdominal:      General: There is no distension. Palpations: Abdomen is soft. Musculoskeletal: Normal range of motion. General: No deformity. Skin:     General: Skin is warm and dry. Neurological:      Mental Status: He is alert and oriented to person, place, and time. Coordination: Coordination normal.   Psychiatric:         Behavior: Behavior normal.          MDM  Number of Diagnoses or Management Options  Nasal furuncle  Diagnosis management comments: Furuncle noted in right nasal vestibule. No concern for cellulitis of nose. Will dc with bacitracin and clindamycin. Counseled to use warm compress for relief. No drainable abscess noted. Will FU w/ENT on Monday for wound recheck. Strict RTER precautions provided.      ED Course as of Apr 02 1859 Fri Apr 02, 2021 1853 Pt will take his BP medication when he gets home. Offered here but he refused, said he lives close by. Strict RTER precautions provided. Will FU w/ENT on Monday. Stable for dc. [AL]   1855 Verapamil 180 mg QHS  Candesartan 16 mg QHS    [AL]      ED Course User Index  [AL] Annie Siddiqi MD       Procedures    Patient's results have been reviewed with them. Patient and/or family have verbally conveyed their understanding and agreement of the patient's signs, symptoms, diagnosis, treatment and prognosis and additionally agree to follow up as recommended or return to the Emergency Room should their condition change prior to follow-up. Discharge instructions have also been provided to the patient with some educational information regarding their diagnosis as well a list of reasons why they would want to return to the ER prior to their follow-up appointment should their condition change.

## 2021-04-02 NOTE — ED TRIAGE NOTES
Triage: Pt arrives ambulatory from home with CC of pea sized area of numbness beneath his right nare x1 day. He reports he was seen with a similar complaint a few months ago but that time the numbness was to his buttocks.

## 2021-04-02 NOTE — DISCHARGE INSTRUCTIONS
Please take clindamycin (antibiotic) apply bacitracin to your nose. Use warm water compresses to the area 3-4 times a day to make sure an abscess does not form. Make sure there is no worsening like swelling, or redness. Return if that  happens. See ENT on Monday for recheck.

## 2021-04-13 ENCOUNTER — HOSPITAL ENCOUNTER (OUTPATIENT)
Dept: NUTRITION | Age: 51
Discharge: HOME | End: 2021-04-13
Payer: MEDICAID

## 2021-04-13 DIAGNOSIS — E11.9 TYPE 2 DIABETES MELLITUS WITHOUT COMPLICATION, WITHOUT LONG-TERM CURRENT USE OF INSULIN (HCC): ICD-10-CM

## 2021-04-13 PROCEDURE — 97803 MED NUTRITION INDIV SUBSEQ: CPT | Performed by: DIETITIAN, REGISTERED

## 2021-04-13 NOTE — PROGRESS NOTES
NUTRITION  FOLLOW-UP TREATMENT NOTE  Patient Name: Lesia Sink         Date: 2021  : 1970    YES Patient  Verified  Diagnosis:  I10 (ICD-10-CM) - Essential hypertension    E11.9 (ICD-10-CM) - Type 2 diabetes mellitus without complication, without long-term current use of insulin (HCC)    E78.5 (ICD-10-CM) - Dyslipidemia       In time:  2:40pm          Out time:   3:10pm   Total Treatment Time (min):   30     SUBJECTIVE/ASSESSMENT  Current Wt: 246.2 Previous Wt: 241.0 Wt Change: +5.2     Initial Wt: 243.4  Total Wt change: +1.8 Height: 74     Changes in medication or medical history? Any new allergies, surgeries or procedures? NO    If yes, update Summary List        Nutrition Diagnosis        Diagnosis Status: Food and nutrition related knowledge deficit R/T lack of prior education for healthy weight loss and diabetes AEB request for counseling     [x]  Improved []  No Change    []  Declined   []  Discontinued     Inadequate energy intake R/T changes made to diet for weight loss and fears around foods related to diabetes AEB diet recall showing calories consumed below recommendation and pt report of being unsure of what to eat and feeling weak and hungry in afternoons and at night.   []  Improved []  No Change    []  Declined   [x]  Discontinued        Nutrition Monitoring and Evaluation: Checking blood pressure at home = 131/85  SMBG = (morning) = 103mg/dl  2.5hrs after = 120mg/dl    Notes salads 3 times per week. Eating out less often. Still working out 3 times per week. Ramadan starts today. Reviewed food choices. He has stopped drinking sodas and juice. Despite increase on scale he notes clothes are fitting better. No changes made today to previous Ramadan plan. Previous goals:  Met. -Exercise: continue gym 3 days per week  Met/ no eating out with Ramadan this month. -continue to Limit eating out to 1 time per week to reduce sodium intake  Improved.  -Decrease total carbohydrates and calories at breakfast by changing to 1 cup beans, 1 egg, and 1 slice of toast. OR 2 cups beans + egg OR oatmeal + nuts  Making salads 3 time sper week. -use balanced plate provided to help reduce portions at meals (salads plate provided today)  Not addressed. Notes less eating out. -look up nutrition facts for restaurant foods on [employer]. org  Met. -eat before workouts 3 days per week (15-20g suggested to start- see how you feel)     Nutrition Prescription and  Intervention Reviewed carbohydrate sources within cultural food practices. Reviewed balanced diabetes plate. Reviewed exercise goals to work towards 200min per week (cardio, strength, and flexibility). Reviewed Ramadan holiday coming up. Suggested keeping a snack and his meter with him in case he feels funny during the day to be able to check for low blood sugar. Reviewed how to treat a low blood sugar. Patient Education:  [x]  Review current plan with patient   []  Other:    Handouts/  Information Provided: []  Carbohydrates  []  Protein  []  Fiber  []  Serving Sizes  []  Fluids  []  General guidelines [x]  Diabetes plate  []  Cholesterol  []  Sodium  []  SBGM  []  Food Journals  []  Others:      Patient Goals -Exercise: continue gym 3 days per week  -eat before workouts (1 toast, 1 fruit, nuts) and pack belvita bar in case having issues with blood sugar while exercising.   -continue adequate water intake.       PLAN  [x]  Continue on current plan []  Follow-up PRN   []  Discharge due to :    [x]  Next appt: 4 weeks     Dietitian: Erin Zapata MS, RD, CSSD    Date: 4/13/2021 Time: 2:25pm

## 2021-06-01 RX ORDER — ATORVASTATIN CALCIUM 10 MG/1
10 TABLET, FILM COATED ORAL DAILY
Qty: 30 TABLET | Refills: 11 | Status: SHIPPED | OUTPATIENT
Start: 2021-06-01 | End: 2022-05-11 | Stop reason: SDUPTHER

## 2021-06-07 ENCOUNTER — APPOINTMENT (OUTPATIENT)
Dept: NUTRITION | Age: 51
End: 2021-06-07

## 2021-06-08 ENCOUNTER — APPOINTMENT (OUTPATIENT)
Dept: NUTRITION | Age: 51
End: 2021-06-08

## 2021-06-22 ENCOUNTER — APPOINTMENT (OUTPATIENT)
Dept: NUTRITION | Age: 51
End: 2021-06-22

## 2021-07-07 ENCOUNTER — APPOINTMENT (OUTPATIENT)
Dept: NUTRITION | Age: 51
End: 2021-07-07

## 2021-07-07 ENCOUNTER — HOSPITAL ENCOUNTER (OUTPATIENT)
Dept: NUTRITION | Age: 51
Discharge: HOME | End: 2021-07-07
Payer: MEDICAID

## 2021-07-07 DIAGNOSIS — E11.9 TYPE 2 DIABETES MELLITUS WITHOUT COMPLICATION, WITHOUT LONG-TERM CURRENT USE OF INSULIN (HCC): ICD-10-CM

## 2021-07-07 DIAGNOSIS — I10 ESSENTIAL HYPERTENSION: ICD-10-CM

## 2021-07-07 DIAGNOSIS — E78.5 DYSLIPIDEMIA: ICD-10-CM

## 2021-07-07 PROCEDURE — 97803 MED NUTRITION INDIV SUBSEQ: CPT | Performed by: DIETITIAN, REGISTERED

## 2021-07-07 NOTE — PROGRESS NOTES
NUTRITION  FOLLOW-UP TREATMENT NOTE  Patient Name: Vishal Ponce         Date: 2021  : 1970    YES Patient  Verified  Diagnosis:  I10 (ICD-10-CM) - Essential hypertension    E11.9 (ICD-10-CM) - Type 2 diabetes mellitus without complication, without long-term current use of insulin (HCC)    E78.5 (ICD-10-CM) - Dyslipidemia       In time:  1055am          Out time:   1125am   Total Treatment Time (min):   30     SUBJECTIVE/ASSESSMENT  Current Wt: 252.4 Previous Wt: 246.2 Wt Change: +6.2     Initial Wt: 243.4  Total Wt change: +8 Height: 74     Changes in medication or medical history? Any new allergies, surgeries or procedures? NO    If yes, update Summary List        Nutrition Diagnosis        Diagnosis Status: Food and nutrition related knowledge deficit R/T lack of prior education for healthy weight loss and diabetes AEB request for counseling     [x]  Improved []  No Change    []  Declined   []  Discontinued     Inadequate energy intake R/T changes made to diet for weight loss and fears around foods related to diabetes AEB diet recall showing calories consumed below recommendation and pt report of being unsure of what to eat and feeling weak and hungry in afternoons and at night.   []  Improved []  No Change    []  Declined   [x]  Discontinued        Nutrition Monitoring and Evaluation: S- Might have small item before workout at 6am   B- Oatmeal w/ raisins or 3 dates and milk or 2 eggs with milk or Beans  L- Salad with chicken or lamb, rice  S- None   D- Green tea unswt, nuts (cashew, mixed nuts)   S- None   Drinks: Water     Exercise 6am 3x week     Checking blood pressure at home = 135/85  SMBG = (morning) = 115-125     Training with Arabella Ricardo, slowed intensity with Ramadan   Trying to implement fasting 2x week     Going to [facility] for a week. Driving there. Planning for some meals on the road- Miley elmroe RD recommended [email or web address] org for planning ahead and meals on the road.      Pt plans to fast 2x week as he felt like this will make him successful as he saw results during Ramadan fasting. Previous goals:  Met. Would like to add in one day of walking in addition to 3 days with Saad.-Exercise: continue gym 3 days per week  Met. Will have 1 bar before workout. -eat before workouts (1 toast, 1 fruit, nuts) and pack belvita bar in case having issues with blood sugar while exercising. Met. Around 6-8 bottles of water per day. -continue adequate water intake. Nutrition Prescription and  Intervention Reviewed carbohydrate sources within cultural food practices. Reviewed balanced diabetes plate. Reviewed exercise goals to work towards 200min per week (cardio, strength, and flexibility). Discussed adequate fluid intake   Educated on ways to plan ahead for meal while traveling. Reviewed balanced plate for meals and need for making sure balanced nutrition is achieved while fasting. Patient Education:  [x]  Review current plan with patient   []  Other:    Handouts/  Information Provided: []  Carbohydrates  []  Protein  []  Fiber  []  Serving Sizes  []  Fluids  []  General guidelines []  Diabetes plate  []  Cholesterol  []  Sodium  []  SBGM  []  Food Journals  []  Others:      Patient Goals - Fasting 2 days per week, Making sure to use balanced plate for breakfast and dinner meals on those days. - Continue having breakfast bar prior to workouts and include a protein item with breakfast following workouts. - Add in 1 day of walking for 60 min each week. - Keep up water intake of 8 bottles per day.        PLAN  [x]  Continue on current plan []  Follow-up PRN   []  Discharge due to :    [x]  Next appt: 4 weeks     Dietitian: Yi Carnes, MPH, RDN    Date: 7/7/2021 Time: 11:30am

## 2021-07-22 RX ORDER — NAPROXEN 500 MG/1
500 TABLET ORAL 2 TIMES DAILY WITH MEALS
Qty: 60 TABLET | Refills: 1 | Status: SHIPPED | OUTPATIENT
Start: 2021-07-22 | End: 2022-04-04

## 2021-07-30 RX ORDER — METFORMIN HYDROCHLORIDE 500 MG/1
TABLET ORAL
Qty: 90 TABLET | Refills: 3 | Status: SHIPPED | OUTPATIENT
Start: 2021-07-30 | End: 2022-05-04

## 2021-08-07 RX ORDER — METFORMIN HYDROCHLORIDE 500 MG/1
TABLET, EXTENDED RELEASE ORAL
Qty: 30 TABLET | Refills: 11 | Status: SHIPPED | OUTPATIENT
Start: 2021-08-07 | End: 2022-05-04

## 2021-08-09 ENCOUNTER — HOSPITAL ENCOUNTER (OUTPATIENT)
Dept: NUTRITION | Age: 51
End: 2021-08-09

## 2021-09-02 ENCOUNTER — HOSPITAL ENCOUNTER (OUTPATIENT)
Dept: NUTRITION | Age: 51
End: 2021-09-02

## 2021-11-24 ENCOUNTER — OFFICE VISIT (OUTPATIENT)
Dept: INTERNAL MEDICINE CLINIC | Age: 51
End: 2021-11-24
Payer: MEDICAID

## 2021-11-24 VITALS
HEART RATE: 79 BPM | DIASTOLIC BLOOD PRESSURE: 84 MMHG | TEMPERATURE: 98.4 F | SYSTOLIC BLOOD PRESSURE: 128 MMHG | WEIGHT: 254.1 LBS | OXYGEN SATURATION: 96 % | RESPIRATION RATE: 18 BRPM | HEIGHT: 74 IN | BODY MASS INDEX: 32.61 KG/M2

## 2021-11-24 DIAGNOSIS — I51.89 GRADE I DIASTOLIC DYSFUNCTION: ICD-10-CM

## 2021-11-24 DIAGNOSIS — E11.9 TYPE 2 DIABETES MELLITUS WITHOUT COMPLICATION, WITHOUT LONG-TERM CURRENT USE OF INSULIN (HCC): Primary | ICD-10-CM

## 2021-11-24 DIAGNOSIS — E78.5 DYSLIPIDEMIA: ICD-10-CM

## 2021-11-24 DIAGNOSIS — E66.9 OBESITY (BMI 30.0-34.9): ICD-10-CM

## 2021-11-24 DIAGNOSIS — I10 PRIMARY HYPERTENSION: ICD-10-CM

## 2021-11-24 DIAGNOSIS — G43.009 MIGRAINE WITHOUT AURA AND WITHOUT STATUS MIGRAINOSUS, NOT INTRACTABLE: ICD-10-CM

## 2021-11-24 LAB
ALBUMIN SERPL-MCNC: 3.9 G/DL (ref 3.5–5)
ALBUMIN/GLOB SERPL: 1 {RATIO} (ref 1.1–2.2)
ALP SERPL-CCNC: 88 U/L (ref 45–117)
ALT SERPL-CCNC: 43 U/L (ref 12–78)
ANION GAP SERPL CALC-SCNC: 6 MMOL/L (ref 5–15)
APPEARANCE UR: CLEAR
AST SERPL-CCNC: 13 U/L (ref 15–37)
BASOPHILS # BLD: 0.1 K/UL (ref 0–0.1)
BASOPHILS NFR BLD: 1 % (ref 0–1)
BILIRUB SERPL-MCNC: 0.7 MG/DL (ref 0.2–1)
BILIRUB UR QL: NEGATIVE
BUN SERPL-MCNC: 15 MG/DL (ref 6–20)
BUN/CREAT SERPL: 14 (ref 12–20)
CALCIUM SERPL-MCNC: 9.7 MG/DL (ref 8.5–10.1)
CHLORIDE SERPL-SCNC: 106 MMOL/L (ref 97–108)
CHOLEST SERPL-MCNC: 141 MG/DL
CO2 SERPL-SCNC: 25 MMOL/L (ref 21–32)
COLOR UR: NORMAL
CREAT SERPL-MCNC: 1.09 MG/DL (ref 0.7–1.3)
CREAT UR-MCNC: 252 MG/DL
DIFFERENTIAL METHOD BLD: ABNORMAL
EOSINOPHIL # BLD: 0 K/UL (ref 0–0.4)
EOSINOPHIL NFR BLD: 1 % (ref 0–7)
ERYTHROCYTE [DISTWIDTH] IN BLOOD BY AUTOMATED COUNT: 13.3 % (ref 11.5–14.5)
EST. AVERAGE GLUCOSE BLD GHB EST-MCNC: 134 MG/DL
GLOBULIN SER CALC-MCNC: 3.8 G/DL (ref 2–4)
GLUCOSE SERPL-MCNC: 124 MG/DL (ref 65–100)
GLUCOSE UR STRIP.AUTO-MCNC: NEGATIVE MG/DL
HBA1C MFR BLD: 6.3 % (ref 4–5.6)
HCT VFR BLD AUTO: 49.7 % (ref 36.6–50.3)
HCV AB SERPL QL IA: NONREACTIVE
HDLC SERPL-MCNC: 57 MG/DL
HDLC SERPL: 2.5 {RATIO} (ref 0–5)
HGB BLD-MCNC: 15.9 G/DL (ref 12.1–17)
HGB UR QL STRIP: NEGATIVE
IMM GRANULOCYTES # BLD AUTO: 0 K/UL (ref 0–0.04)
IMM GRANULOCYTES NFR BLD AUTO: 0 % (ref 0–0.5)
KETONES UR QL STRIP.AUTO: NEGATIVE MG/DL
LDLC SERPL CALC-MCNC: 51 MG/DL (ref 0–100)
LEUKOCYTE ESTERASE UR QL STRIP.AUTO: NEGATIVE
LYMPHOCYTES # BLD: 3.1 K/UL (ref 0.8–3.5)
LYMPHOCYTES NFR BLD: 50 % (ref 12–49)
MCH RBC QN AUTO: 27.8 PG (ref 26–34)
MCHC RBC AUTO-ENTMCNC: 32 G/DL (ref 30–36.5)
MCV RBC AUTO: 86.9 FL (ref 80–99)
MICROALBUMIN UR-MCNC: 0.98 MG/DL
MICROALBUMIN/CREAT UR-RTO: 4 MG/G (ref 0–30)
MONOCYTES # BLD: 0.4 K/UL (ref 0–1)
MONOCYTES NFR BLD: 7 % (ref 5–13)
NEUTS SEG # BLD: 2.5 K/UL (ref 1.8–8)
NEUTS SEG NFR BLD: 41 % (ref 32–75)
NITRITE UR QL STRIP.AUTO: NEGATIVE
NRBC # BLD: 0 K/UL (ref 0–0.01)
NRBC BLD-RTO: 0 PER 100 WBC
PH UR STRIP: 5 [PH] (ref 5–8)
PLATELET # BLD AUTO: 293 K/UL (ref 150–400)
PMV BLD AUTO: 10 FL (ref 8.9–12.9)
POTASSIUM SERPL-SCNC: 4.3 MMOL/L (ref 3.5–5.1)
PROT SERPL-MCNC: 7.7 G/DL (ref 6.4–8.2)
PROT UR STRIP-MCNC: NEGATIVE MG/DL
PSA SERPL-MCNC: 0.5 NG/ML (ref 0.01–4)
RBC # BLD AUTO: 5.72 M/UL (ref 4.1–5.7)
SODIUM SERPL-SCNC: 137 MMOL/L (ref 136–145)
SP GR UR REFRACTOMETRY: 1.02 (ref 1–1.03)
TRIGL SERPL-MCNC: 165 MG/DL (ref ?–150)
UROBILINOGEN UR QL STRIP.AUTO: 0.2 EU/DL (ref 0.2–1)
VLDLC SERPL CALC-MCNC: 33 MG/DL
WBC # BLD AUTO: 6.1 K/UL (ref 4.1–11.1)

## 2021-11-24 PROCEDURE — 99214 OFFICE O/P EST MOD 30 MIN: CPT | Performed by: INTERNAL MEDICINE

## 2021-11-24 RX ORDER — FLUTICASONE PROPIONATE 50 MCG
2 SPRAY, SUSPENSION (ML) NASAL DAILY
Qty: 1 EACH | Refills: 11 | Status: SHIPPED | OUTPATIENT
Start: 2021-11-24 | End: 2022-04-04

## 2021-11-24 NOTE — PROGRESS NOTES
1. Have you been to the ER, urgent care clinic since your last visit? Hospitalized since your last visit? No    2. Have you seen or consulted any other health care providers outside of the [address] [place] [address] since your last visit? Include any pap smears or colon screening.  No     Requesting a eye exam

## 2021-11-24 NOTE — PROGRESS NOTES
SPORTS MEDICINE AND PRIMARY CARE  Neelam Song MD, [address] [facility],[address] [address]  Phone:  [phone]  Fax: [phone]       Chief Complaint   Patient presents with    Annual Exam   .      SUBJECTIVE:    Severa Beal is a 46 y.o. male Patient returns today with a known history of dyslipidemia, primary hypertension, DM type 2, obesity, grade 1 diastolic dysfunction and is seen for evaluation. Patient returns today voicing no new complaints and is seen for evaluation. Current Outpatient Medications   Medication Sig Dispense Refill    fluticasone propionate (FLONASE) 50 mcg/actuation nasal spray 2 Sprays by Both Nostrils route daily. 1 Each 11    metFORMIN ER (GLUCOPHAGE XR) 500 mg tablet TAKE 1 TABLET BY MOUTH DAILY WITH DINNER 30 Tablet 11    metFORMIN (GLUCOPHAGE) 500 mg tablet TAKE 1 TABLET BY MOUTH DAILY WITH DINNER 90 Tablet 3    naproxen (NAPROSYN) 500 mg tablet Take 1 Tablet by mouth two (2) times daily (with meals). 60 Tablet 1    atorvastatin (LIPITOR) 10 mg tablet Take 1 Tablet by mouth daily. 30 Tablet 11    losartan (COZAAR) 100 mg tablet Take 1 Tab by mouth daily. 90 Tab 3    dilTIAZem ER (CARDIZEM CD) 180 mg capsule Take 1 Cap by mouth daily. 90 Cap 3    butalbital-acetaminophen-caffeine (FIORICET, ESGIC) -40 mg per tablet Take 1 Tab by mouth every four (4) hours as needed for Headache. 60 Tab 2    lidocaine (Lidoderm) 5 % Apply patch to the affected area for 12 hours a day and remove for 12 hours a day. 5 Each 0    albuterol (PROVENTIL HFA, VENTOLIN HFA, PROAIR HFA) 90 mcg/actuation inhaler Take 2 Puffs by inhalation every four (4) hours as needed for Wheezing. 1 Inhaler 11    acetaminophen (TYLENOL) 500 mg tablet Take 2 Tabs by mouth every six (6) hours as needed for Pain or Fever. 30 Tab 0    ibuprofen (MOTRIN) 600 mg tablet Take 1 Tab by mouth every six (6) hours as needed for Pain (fever).  20 Tab 0    clotrimazole-betamethasone (LOTRISONE) topical cream Apply  to affected area two (2) times a day. 45 g 11    Blood-Glucose Meter (ONETOUCH VERIO FLEX) misc Used to test blood sugars 3 times daily 1 Each 0    glucose blood VI test strips (ONETOUCH VERIO) strip Used to test blood sugar 3 times daily. 100 Strip 11     Past Medical History:   Diagnosis Date    Abnormal EKG 05/17/2019    Diverticulosis 06/04/2019    DM2 (diabetes mellitus, type 2) ([facility] [address] 05/17/2019    Dyslipidemia     Grade I diastolic dysfunction 17/65/6665    echo    Headache 11/06/2020    Hemorrhoids 06/04/2019    HTN (hypertension)     Low back pain     Migraine without aura and without status migrainosus, not intractable 11/30/2020    Shanice Galvan DO - 12/4/20 - Normal MRI of the brain    Obesity (BMI 30.0-34. 9)     Obesity (BMI 30.0-34. 9)     Preventative health care     S/P colonoscopy 06/28/2019    md viry -10 yrs -hemorrhoids,tics    Tinnitus of both ears      Past Surgical History:   Procedure Laterality Date    COLONOSCOPY N/A 6/28/2019    COLONOSCOPY performed by Smiley Hernández MD at [facility] ENDOSCOPY     No Known Allergies      REVIEW OF SYSTEMS:  General: negative for - chills or fever  ENT: negative for - headaches, nasal congestion or tinnitus  Respiratory: negative for - cough, hemoptysis, shortness of breath or wheezing  Cardiovascular : negative for - chest pain, edema, palpitations or shortness of breath  Gastrointestinal: negative for - abdominal pain, blood in stools, heartburn or nausea/vomiting  Genito-Urinary: no dysuria, trouble voiding, or hematuria  Musculoskeletal: negative for - gait disturbance, joint pain, joint stiffness or joint swelling  Neurological: no TIA or stroke symptoms  Hematologic: no bruises, no bleeding, no swollen glands  Integument: no lumps, mole changes, nail changes or rash  Endocrine: no malaise/lethargy or unexpected weight changes      Social History     Socioeconomic History    Marital status:  Tobacco Use    Smoking status: Never Smoker    Smokeless tobacco: Never Used   Vaping Use    Vaping Use: Never used   Substance and Sexual Activity    Alcohol use: Never    Drug use: Never    Sexual activity: Yes     Partners: Female     Birth control/protection: None   Social History Narrative    Habits:  He is a lifetime nonsmoker, non drinker, non drug abuser.         Social History:  The patient is , lives with his wife. He was born in [place]. He has been in the [address] [place] [address],[address] since 2005. They have three children, two daughters ages 9 and 11, and a son age 3. He completed his ALIA at [employer]. He was in [employer] for five years. 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 [employer] background is Mu-ism.         Family History:  Father 76, alive and well. Mother 67 with diabetes. Six brothers and four sisters are alive and well. Family History   Problem Relation Age of Onset    Diabetes Mother        OBJECTIVE:    Visit Vitals  /84   Pulse 79   Temp 98.4 °F (36.9 °C) (Oral)   Resp 18   Ht 6' 2\" (1.88 m)   Wt 254 lb 1.6 oz (115.3 kg)   SpO2 96%   BMI 32.62 kg/m²     CONSTITUTIONAL: well , well nourished, appears age appropriate  EYES: perrla, eom intact  ENMT:moist mucous membranes, pharynx clear  NECK: supple. Thyroid normal  RESPIRATORY: Chest: clear bilaterally   CARDIOVASCULAR: Heart: regular rate and rhythm  GASTROINTESTINAL: Abdomen: soft, bowel sounds active  HEMATOLOGIC: no pathological lymph nodes palpated  MUSCULOSKELETAL: Extremities: no edema, pulse 1+   INTEGUMENT: No unusual rashes or suspicious skin lesions noted. Nails appear normal.  NEUROLOGIC: non-focal exam   MENTAL STATUS: alert and oriented, appropriate affect           ASSESSMENT:  1. Type 2 diabetes mellitus without complication, without long-term current use of insulin ([address]    2. Migraine without aura and without status migrainosus, not intractable    3. Primary hypertension    4.  Grade I diastolic dysfunction    5. Dyslipidemia    6. Obesity (BMI 30.0-34. 9)      We will assess blood sugar control with hemoglobin A1c and report the results to him at [address] No recent migraines. Blood pressure control is at goal.    No evidence of chronic cardiac decompensation. We will check his lipid panel and Apo-B for control of his dyslipidemia and to determine if we need to titrate his statin. He continues to work on his weight. He has been out of his normal routine recently, which may account for the change. He will be back to see me in four to six months, sooner if needed. I have discussed the diagnosis with the patient and the intended plan as seen in the  Orders. The patient understands and agees with the plan. The patient has   received an after visit summary and questions were answered concerning  future plans  Patient labs and/or xrays were reviewed  Past records were reviewed. PLAN:  .  Orders Placed This Encounter    MICROALBUMIN, UR, RAND W/ MICROALB/CREAT RATIO    URINALYSIS W/ RFLX MICROSCOPIC    CBC WITH AUTOMATED DIFF    METABOLIC PANEL, COMPREHENSIVE    LIPID PANEL    PROSTATE SPECIFIC AG    APOLIPOPROTEIN B    HEMOGLOBIN A1C WITH EAG    GAMMOPATHY EVAL, SPEP/CIRILO, IG QT/FLC    HEPATITIS C AB    REFERRAL TO OPHTHALMOLOGY    fluticasone propionate (FLONASE) 50 mcg/actuation nasal spray       Follow-up and Dispositions    · Return in about 4 months (around 3/24/2022). ATTENTION:   This medical record was transcribed using an electronic medical records system. Although proofread, it may and can contain electronic and spelling errors. Other human spelling and other errors may be present. Corrections may be executed at a later time. Please feel free to contact us for any clarifications as needed.

## 2021-11-25 LAB — APO B SERPL-MCNC: 60 MG/DL

## 2021-11-25 NOTE — PROGRESS NOTES
Your triglycerides have improved since last year. Remember that they go up with alcohol sweets and starches. You still have evidence of prediabetes.   The remainder of your studies are normal

## 2021-11-29 LAB
ALBUMIN SERPL ELPH-MCNC: 3.7 G/DL (ref 2.9–4.4)
ALBUMIN/GLOB SERPL: 1.1 {RATIO} (ref 0.7–1.7)
ALPHA1 GLOB SERPL ELPH-MCNC: 0.2 G/DL (ref 0–0.4)
ALPHA2 GLOB SERPL ELPH-MCNC: 0.9 G/DL (ref 0.4–1)
B-GLOBULIN SERPL ELPH-MCNC: 1.2 G/DL (ref 0.7–1.3)
GAMMA GLOB SERPL ELPH-MCNC: 1.4 G/DL (ref 0.4–1.8)
GLOBULIN SER-MCNC: 3.7 G/DL (ref 2.2–3.9)
IGA SERPL-MCNC: 261 MG/DL (ref 90–386)
IGG SERPL-MCNC: 1387 MG/DL (ref 603–1613)
IGM SERPL-MCNC: 107 MG/DL (ref 20–172)
INTERPRETATION SERPL IEP-IMP: ABNORMAL
KAPPA LC FREE SER-MCNC: 18.9 MG/L (ref 3.3–19.4)
KAPPA LC FREE/LAMBDA FREE SER: 1.93 {RATIO} (ref 0.26–1.65)
LAMBDA LC FREE SERPL-MCNC: 9.8 MG/L (ref 5.7–26.3)
M PROTEIN SERPL ELPH-MCNC: ABNORMAL G/DL
PROT SERPL-MCNC: 7.4 G/DL (ref 6–8.5)

## 2022-01-21 RX ORDER — AZITHROMYCIN 250 MG/1
TABLET, FILM COATED ORAL
Qty: 6 TABLET | Refills: 0 | Status: SHIPPED | OUTPATIENT
Start: 2022-01-21 | End: 2022-01-26

## 2022-02-24 ENCOUNTER — APPOINTMENT (OUTPATIENT)
Dept: CT IMAGING | Age: 52
End: 2022-02-24
Attending: EMERGENCY MEDICINE
Payer: MEDICAID

## 2022-02-24 ENCOUNTER — HOSPITAL ENCOUNTER (EMERGENCY)
Age: 52
Discharge: HOME | End: 2022-02-24
Attending: STUDENT IN AN ORGANIZED HEALTH CARE EDUCATION/TRAINING PROGRAM
Payer: MEDICAID

## 2022-02-24 VITALS
SYSTOLIC BLOOD PRESSURE: 147 MMHG | TEMPERATURE: 98 F | HEART RATE: 67 BPM | DIASTOLIC BLOOD PRESSURE: 96 MMHG | OXYGEN SATURATION: 98 % | WEIGHT: 240.3 LBS | BODY MASS INDEX: 30.84 KG/M2 | RESPIRATION RATE: 20 BRPM | HEIGHT: 74 IN

## 2022-02-24 DIAGNOSIS — H92.02 LEFT EAR PAIN: ICD-10-CM

## 2022-02-24 DIAGNOSIS — J01.00 ACUTE NON-RECURRENT MAXILLARY SINUSITIS: Primary | ICD-10-CM

## 2022-02-24 LAB
ALBUMIN SERPL-MCNC: 3.5 G/DL (ref 3.5–5)
ALBUMIN/GLOB SERPL: 0.9 {RATIO} (ref 1.1–2.2)
ALP SERPL-CCNC: 91 U/L (ref 45–117)
ALT SERPL-CCNC: 43 U/L (ref 12–78)
ANION GAP SERPL CALC-SCNC: 7 MMOL/L (ref 5–15)
AST SERPL-CCNC: 18 U/L (ref 15–37)
BASOPHILS # BLD: 0.1 K/UL (ref 0–0.1)
BASOPHILS NFR BLD: 1 % (ref 0–1)
BILIRUB SERPL-MCNC: 0.6 MG/DL (ref 0.2–1)
BUN SERPL-MCNC: 13 MG/DL (ref 6–20)
BUN/CREAT SERPL: 13 (ref 12–20)
CALCIUM SERPL-MCNC: 9.3 MG/DL (ref 8.5–10.1)
CHLORIDE SERPL-SCNC: 109 MMOL/L (ref 97–108)
CO2 SERPL-SCNC: 22 MMOL/L (ref 21–32)
COMMENT, HOLDF: NORMAL
CREAT SERPL-MCNC: 1.03 MG/DL (ref 0.7–1.3)
DIFFERENTIAL METHOD BLD: ABNORMAL
EOSINOPHIL # BLD: 0 K/UL (ref 0–0.4)
EOSINOPHIL NFR BLD: 1 % (ref 0–7)
ERYTHROCYTE [DISTWIDTH] IN BLOOD BY AUTOMATED COUNT: 13.3 % (ref 11.5–14.5)
GLOBULIN SER CALC-MCNC: 4.1 G/DL (ref 2–4)
GLUCOSE SERPL-MCNC: 142 MG/DL (ref 65–100)
HCT VFR BLD AUTO: 46.1 % (ref 36.6–50.3)
HGB BLD-MCNC: 15.7 G/DL (ref 12.1–17)
IMM GRANULOCYTES # BLD AUTO: 0 K/UL (ref 0–0.04)
IMM GRANULOCYTES NFR BLD AUTO: 0 % (ref 0–0.5)
LYMPHOCYTES # BLD: 2.9 K/UL (ref 0.8–3.5)
LYMPHOCYTES NFR BLD: 53 % (ref 12–49)
MCH RBC QN AUTO: 27.9 PG (ref 26–34)
MCHC RBC AUTO-ENTMCNC: 34.1 G/DL (ref 30–36.5)
MCV RBC AUTO: 82 FL (ref 80–99)
MONOCYTES # BLD: 0.5 K/UL (ref 0–1)
MONOCYTES NFR BLD: 8 % (ref 5–13)
NEUTS SEG # BLD: 2 K/UL (ref 1.8–8)
NEUTS SEG NFR BLD: 37 % (ref 32–75)
NRBC # BLD: 0 K/UL (ref 0–0.01)
NRBC BLD-RTO: 0 PER 100 WBC
PLATELET # BLD AUTO: 285 K/UL (ref 150–400)
PMV BLD AUTO: 9.3 FL (ref 8.9–12.9)
POTASSIUM SERPL-SCNC: 4.1 MMOL/L (ref 3.5–5.1)
PROT SERPL-MCNC: 7.6 G/DL (ref 6.4–8.2)
RBC # BLD AUTO: 5.62 M/UL (ref 4.1–5.7)
SAMPLES BEING HELD,HOLD: NORMAL
SODIUM SERPL-SCNC: 138 MMOL/L (ref 136–145)
WBC # BLD AUTO: 5.5 K/UL (ref 4.1–11.1)

## 2022-02-24 PROCEDURE — 70450 CT HEAD/BRAIN W/O DYE: CPT

## 2022-02-24 PROCEDURE — 36415 COLL VENOUS BLD VENIPUNCTURE: CPT

## 2022-02-24 PROCEDURE — 85025 COMPLETE CBC W/AUTO DIFF WBC: CPT

## 2022-02-24 PROCEDURE — 99282 EMERGENCY DEPT VISIT SF MDM: CPT

## 2022-02-24 PROCEDURE — 80053 COMPREHEN METABOLIC PANEL: CPT

## 2022-02-24 RX ORDER — AMOXICILLIN AND CLAVULANATE POTASSIUM 875; 125 MG/1; MG/1
1 TABLET, FILM COATED ORAL 2 TIMES DAILY
Qty: 20 TABLET | Refills: 0 | Status: SHIPPED | OUTPATIENT
Start: 2022-02-24 | End: 2022-03-06

## 2022-02-24 NOTE — ED PROVIDER NOTES
Please note that this dictation was completed with [employer], the computer voice recognition software.  Quite often unanticipated grammatical, syntax, homophones, and other interpretive errors are inadvertently transcribed by the computer software.  Please disregard these errors.  Please excuse any errors that have escaped final proofreading. Patient is a 63-year-old male with history of type 2 diabetes, hyperlipidemia, hypertension, diverticulosis, presenting to ED for evaluation of left-sided ear pain with onset 2 days ago. States he is feeling a pressure-like pain to his ear and left side of his face but has also felt some tingling sensation to the left side of his face which has been happening intermittently for the past 2 days. Feels pain when he opens and closes his mouth. He is also been experiencing nasal congestion for the past several days as well as a sore throat for several weeks. He denies fever, chills, headache, dizziness, difficulty with speech, trouble walking, neck stiffness, hearing loss, or any other medical complaints at this time. Past Medical History:   Diagnosis Date    Abnormal EKG 05/17/2019    Diverticulosis 06/04/2019    DM2 (diabetes mellitus, type 2) ([facility] [address] 05/17/2019    Dyslipidemia     Grade I diastolic dysfunction 69/50/6713    echo    Headache 11/06/2020    Hemorrhoids 06/04/2019    HTN (hypertension)     Low back pain     Migraine without aura and without status migrainosus, not intractable 11/30/2020    Petra Russell,  - 12/4/20 - Normal MRI of the brain    Obesity (BMI 30.0-34. 9)     Obesity (BMI 30.0-34. 9)     Preventative health care     S/P colonoscopy 06/28/2019    md viry -10 yrs -hemorrhoids,tics    Tinnitus of both ears        Past Surgical History:   Procedure Laterality Date    COLONOSCOPY N/A 6/28/2019    COLONOSCOPY performed by Beti Fernandez MD at [facility] ENDOSCOPY         Family History:   Problem Relation Age of Onset    Diabetes Mother        Social History     Socioeconomic History    Marital status:      Spouse name: Not on file    Number of children: Not on file    Years of education: Not on file    Highest education level: Not on file   Occupational History    Not on file   Tobacco Use    Smoking status: Never Smoker    Smokeless tobacco: Never Used   Vaping Use    Vaping Use: Never used   Substance and Sexual Activity    Alcohol use: Never    Drug use: Never    Sexual activity: Yes     Partners: Female     Birth control/protection: None   Other Topics Concern    Not on file   Social History Narrative    Habits:  He is a lifetime nonsmoker, non drinker, non drug abuser.         Social History:  The patient is , lives with his wife. He was born in [place]. He has been in the [address] [place] [address],[address] since 2005. They have three children, two daughters ages 9 and 11, and a son age 3. He completed his ALIA at [employer]. He was in [facility] for five years. 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 Jain background is Buddhist.         Family History:  Father 76, alive and well. Mother 67 with diabetes. Six brothers and four sisters are alive and well. Social Determinants of Health     Financial Resource Strain:     Difficulty of Paying Living Expenses: Not on file   Food Insecurity:     Worried About Running Out of Food in the Last Year: Not on file    Selin of Food in the Last Year: Not on file   Transportation Needs:     Lack of Transportation (Medical): Not on file    Lack of Transportation (Non-Medical):  Not on file   Physical Activity:     Days of Exercise per Week: Not on file    Minutes of Exercise per Session: Not on file   Stress:     Feeling of Stress : Not on file   Social Connections:     Frequency of Communication with Friends and Family: Not on file    Frequency of Social Gatherings with Friends and Family: Not on file    Attends Jain Services: Not on file   [facility] Active Member of Clubs or Organizations: Not on file    Attends Club or Organization Meetings: Not on file    Marital Status: Not on file   Intimate Partner Violence:     Fear of Current or Ex-Partner: Not on file    Emotionally Abused: Not on file    Physically Abused: Not on file    Sexually Abused: Not on file   Housing Stability:     Unable to Pay for Housing in the Last Year: Not on file    Number of [place] in the Last Year: Not on file    Unstable Housing in the Last Year: Not on file         ALLERGIES: Patient has no known allergies. Review of Systems   Constitutional: Negative for chills and fever. HENT: Positive for congestion, ear pain, sinus pressure, sinus pain and sore throat. Negative for facial swelling and hearing loss. Eyes: Negative for visual disturbance. Respiratory: Negative for cough and shortness of breath. Cardiovascular: Negative for chest pain. Gastrointestinal: Negative for abdominal pain, diarrhea, nausea and vomiting. Genitourinary: Negative for flank pain. Musculoskeletal: Negative for back pain. Skin: Negative for color change. Neurological: Positive for numbness (left facial tingling x 2 days). Negative for dizziness and headaches. Psychiatric/Behavioral: Negative for confusion. Vitals:    02/24/22 0815   BP: (!) 147/96   Pulse: 67   Resp: 20   Temp: 98 °F (36.7 °C)   SpO2: 98%   Weight: 109 kg (240 lb 4.8 oz)   Height: 6' 2\" (1.88 m)            Physical Exam  Vitals and nursing note reviewed. Constitutional:       General: He is not in acute distress. Appearance: Normal appearance. He is not ill-appearing. HENT:      Head: Normocephalic and atraumatic. Jaw: There is normal jaw occlusion. No trismus. Right Ear: Tympanic membrane, ear canal and external ear normal. No decreased hearing noted. No mastoid tenderness. Tympanic membrane is not erythematous.       Left Ear: Tympanic membrane, ear canal and external ear normal. No decreased hearing noted. No mastoid tenderness. Tympanic membrane is not erythematous. Nose: Congestion present. Right Sinus: No maxillary sinus tenderness or frontal sinus tenderness. Left Sinus: Maxillary sinus tenderness and frontal sinus tenderness present. Mouth/Throat:      Pharynx: Uvula midline. Tonsils: No tonsillar exudate or tonsillar abscesses. Eyes:      General: Vision grossly intact. No visual field deficit. Extraocular Movements: Extraocular movements intact. Conjunctiva/sclera: Conjunctivae normal.      Pupils: Pupils are equal, round, and reactive to light. Neck:      Trachea: Phonation normal.   Cardiovascular:      Rate and Rhythm: Normal rate and regular rhythm. Heart sounds: Normal heart sounds. Pulmonary:      Effort: Pulmonary effort is normal.      Breath sounds: Normal breath sounds and air entry. Abdominal:      Palpations: Abdomen is soft. Tenderness: There is no abdominal tenderness. Musculoskeletal:         General: Normal range of motion. Cervical back: Normal range of motion. Skin:     General: Skin is warm and dry. Neurological:      General: No focal deficit present. Mental Status: He is alert and oriented to person, place, and time. Cranial Nerves: Cranial nerves are intact. No cranial nerve deficit, dysarthria or facial asymmetry. Sensory: Sensation is intact. Sensory deficit: gross sensation to face equal bilaterally       Motor: Motor function is intact. Coordination: Coordination is intact. Gait: Gait is intact. Psychiatric:         Attention and Perception: Attention normal.         Mood and Affect: Mood normal.         Speech: Speech normal.          MDM  Number of Diagnoses or Management Options  Acute non-recurrent maxillary sinusitis  Left ear pain  Diagnosis management comments: Patient is alert, afebrile, vitals stable.   Presents with nasal congestion, left facial and ear pain, and intermittent subjective paresthesias to left face. Does have maxillary sinus tenderness on exam.  No signs of otitis media, mastoiditis, pharyngitis, abscess. No focal neurologic deficits on exam, gross sensation equal bilaterally on face. Labs unremarkable. Head CT clear. Suspect likely sinusitis as cause of symptoms, will cover with Augmentin and refer to ENT. Return precautions outlined. All questions answered at this time. Amount and/or Complexity of Data Reviewed  Discuss the patient with other providers: yes (Discussed patient with ED attending Shalini Haley [address], DO who agrees with current management plan.   )      9:27 AM  Pt has been reevaluated. There are no new complaints, changes, or physical findings at this time. All results have been reviewed with patient and/or family. Medications have been reviewed w/ pt and/or family. Pt and/or family's questions have been answered. Pt and/or family expressed good understanding of the dx/tx/rx and is in agreement with plan of care. Pt instructed and agreed to f/u w/ ENT and to return to ED upon further deterioration. Pt is ready for discharge. IMPRESSION:  1. Acute non-recurrent maxillary sinusitis    2. Left ear pain        PLAN:  1. Current Discharge Medication List      START taking these medications    Details   amoxicillin-clavulanate (Augmentin) 875-125 mg per tablet Take 1 Tablet by mouth two (2) times a day for 10 days. Qty: 20 Tablet, Refills: 0  Start date: 2/24/2022, End date: 3/6/2022           2.    Follow-up Information     Follow up With Specialties Details Why Contact David Whalen MD Otolaryngology Schedule an appointment as soon as possible for a visit   [address]  [phone]              Return to ED if worse          Procedures

## 2022-02-24 NOTE — ED TRIAGE NOTES
Patient is coming in with left ear pain and numbness to the side of face for 2 days. Patient states having some sore throat also., Denies fever.  Patient states has been having drainage in the sinuses for awhile and called MD 3 days for ENT specialist.

## 2022-03-04 ENCOUNTER — HOSPITAL ENCOUNTER (EMERGENCY)
Age: 52
Discharge: HOME | End: 2022-03-04
Attending: EMERGENCY MEDICINE
Payer: MEDICAID

## 2022-03-04 VITALS
WEIGHT: 240 LBS | TEMPERATURE: 98.3 F | RESPIRATION RATE: 16 BRPM | HEART RATE: 79 BPM | BODY MASS INDEX: 30.8 KG/M2 | SYSTOLIC BLOOD PRESSURE: 129 MMHG | OXYGEN SATURATION: 98 % | DIASTOLIC BLOOD PRESSURE: 89 MMHG | HEIGHT: 74 IN

## 2022-03-04 DIAGNOSIS — H92.03 EAR PAIN, BILATERAL: Primary | ICD-10-CM

## 2022-03-04 DIAGNOSIS — G44.219 EPISODIC TENSION-TYPE HEADACHE, NOT INTRACTABLE: ICD-10-CM

## 2022-03-04 PROCEDURE — 99283 EMERGENCY DEPT VISIT LOW MDM: CPT

## 2022-03-04 PROCEDURE — 74011250637 HC RX REV CODE- 250/637: Performed by: NURSE PRACTITIONER

## 2022-03-04 RX ORDER — BUTALBITAL, ACETAMINOPHEN AND CAFFEINE 50; 325; 40 MG/1; MG/1; MG/1
2 TABLET ORAL
Status: COMPLETED | OUTPATIENT
Start: 2022-03-04 | End: 2022-03-04

## 2022-03-04 RX ORDER — IBUPROFEN 600 MG/1
600 TABLET ORAL
Status: COMPLETED | OUTPATIENT
Start: 2022-03-04 | End: 2022-03-04

## 2022-03-04 RX ORDER — BUTALBITAL, ACETAMINOPHEN AND CAFFEINE 300; 40; 50 MG/1; MG/1; MG/1
1 CAPSULE ORAL
Qty: 25 CAPSULE | Refills: 0 | Status: SHIPPED | OUTPATIENT
Start: 2022-03-04 | End: 2022-04-04

## 2022-03-04 RX ADMIN — IBUPROFEN 600 MG: 600 TABLET, FILM COATED ORAL at 20:21

## 2022-03-04 RX ADMIN — CARBAMIDE PEROXIDE 6.5% 5 DROP: 6.5 LIQUID AURICULAR (OTIC) at 20:22

## 2022-03-04 RX ADMIN — BUTALBITAL, ACETAMINOPHEN, AND CAFFEINE 2 TABLET: 50; 325; 40 TABLET ORAL at 20:21

## 2022-03-05 NOTE — ED PROVIDER NOTES
60-year-old male with past medical history of type 2 diabetes, dyslipidemia, unspecified headache disorder, hypertension, and bilateral tinnitus presents to the ER today for evaluation of headache. Patient states that he has had an atraumatic moderate intensity frontal headache since yesterday which has not improved despite taking OTC Tylenol. He states that the headache has no aggravating or alleviating factors and also is associated with a sensation of tinnitus and decreased hearing to both ears. He also reports vague nonvertiginous dizziness. Patient denies a history of similar symptoms (even though chart review indicates that the patient recently had a head CT that she complained of headache and chart review indicates the patient has a history of bilateral tinnitus). Patient denies any recent changes to any home medications. He denies any nausea, vomiting, acute visual disturbances, sensorimotor disturbances, disequilibrium/ataxia, speech difficulty. Past Medical History:   Diagnosis Date    Abnormal EKG 05/17/2019    Diverticulosis 06/04/2019    [facility]    Dyslipidemia     Grade I diastolic dysfunction 44/81/0269    echo    Headache 11/06/2020    Hemorrhoids 06/04/2019    HTN (hypertension)     Low back pain     Migraine without aura and without status migrainosus, not intractable 11/30/2020    Yudelka Mittal,  - 12/4/20 - Normal MRI of the brain    Obesity (BMI 30.0-34. 9)     Obesity (BMI 30.0-34. 9)     Preventative health care     S/P colonoscopy 06/28/2019    md viry -10 yrs -hemorrhoids,tics    Tinnitus of both ears        Past Surgical History:   Procedure Laterality Date    COLONOSCOPY N/A 6/28/2019    COLONOSCOPY performed by Claudean Rumple, MD at [facility] ENDOSCOPY         Family History:   Problem Relation Age of Onset    Diabetes Mother        Social History     Socioeconomic History    Marital status:  Spouse name: Not on file    Number of children: Not on file    Years of education: Not on file    Highest education level: Not on file   Occupational History    Not on file   Tobacco Use    Smoking status: Never Smoker    Smokeless tobacco: Never Used   Vaping Use    Vaping Use: Never used   Substance and Sexual Activity    Alcohol use: Never    Drug use: Never    Sexual activity: Yes     Partners: Female     Birth control/protection: None   Other Topics Concern    Not on file   Social History Narrative    Habits:  He is a lifetime nonsmoker, non drinker, non drug abuser.         Social History:  The patient is , lives with his wife. He was born in [place]. He has been in the [address] [place] [address],[address] since 2005. They have three children, two daughters ages 9 and 11, and a son age 3. He completed his ALIA at [employer]. He was in [facility] for five years. 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         Family History:  Father 76, alive and well. Mother 67 with diabetes. Six brothers and four sisters are alive and well. Social Determinants of Health     Financial Resource Strain:     Difficulty of Paying Living Expenses: Not on file   Food Insecurity:     Worried About Running Out of Food in the Last Year: Not on file    Selin of Food in the Last Year: Not on file   Transportation Needs:     Lack of Transportation (Medical): Not on file    Lack of Transportation (Non-Medical):  Not on file   Physical Activity:     Days of Exercise per Week: Not on file    Minutes of Exercise per Session: Not on file   Stress:     Feeling of Stress : Not on file   Social Connections:     Frequency of Communication with Friends and Family: Not on file    Frequency of Social Gatherings with Friends and Family: Not on file    Attends Adventist Services: Not on file    Active Member of Clubs or Organizations: Not on file    Attends Club or Organization Meetings: Not on file    Marital Status: Not on file   Intimate Partner Violence:     Fear of Current or Ex-Partner: Not on file    Emotionally Abused: Not on file    Physically Abused: Not on file    Sexually Abused: Not on file   Housing Stability:     Unable to Pay for Housing in the Last Year: Not on file    Number of [place] in the Last Year: Not on file    Unstable Housing in the Last Year: Not on file         ALLERGIES: Patient has no known allergies. Review of Systems   Constitutional: Negative for fever. HENT: Positive for congestion and hearing loss. Negative for sore throat. Eyes: Negative for visual disturbance. Respiratory: Negative for shortness of breath. Cardiovascular: Negative for palpitations. Gastrointestinal: Negative for nausea and vomiting. Genitourinary: Negative for dysuria. Musculoskeletal: Negative for myalgias. Skin: Negative for rash. Neurological: Positive for dizziness and headaches. Psychiatric/Behavioral: Negative for dysphoric mood. Vitals:    03/04/22 1916   BP: 129/89   Pulse: 79   Resp: 16   Temp: 98.3 °F (36.8 °C)   SpO2: 96%   Weight: 108.9 kg (240 lb)   Height: 6' 2\" (1.88 m)            Physical Exam  Vitals and nursing note reviewed. Constitutional:       General: He is not in acute distress. Appearance: Normal appearance. He is not ill-appearing. HENT:      Head: Normocephalic and atraumatic. Comments: No allodynia to scalp or pain with palpation of temporal arteries     Right Ear: Hearing, tympanic membrane and external ear normal. No decreased hearing noted. No middle ear effusion. There is no impacted cerumen. Tympanic membrane is not erythematous. Left Ear: Hearing, tympanic membrane and external ear normal. No decreased hearing noted. No middle ear effusion. There is no impacted cerumen. Tympanic membrane is not erythematous.       Ears:      Comments: Patient able to hear light sound of finger rub bilaterally without difficulty. Moderate to large size earwax burden in the right canal without evidence of impaction     Nose: Nose normal.      Mouth/Throat:      Mouth: Mucous membranes are moist.      Pharynx: Oropharynx is clear. Eyes:      Extraocular Movements: Extraocular movements intact. Cardiovascular:      Rate and Rhythm: Normal rate and regular rhythm. Pulses: Normal pulses. Heart sounds: Normal heart sounds. Pulmonary:      Effort: Pulmonary effort is normal.      Breath sounds: Normal breath sounds. Abdominal:      General: Bowel sounds are normal.      Palpations: Abdomen is soft. Musculoskeletal:         General: Normal range of motion. Cervical back: Normal range of motion and neck supple. Skin:     General: Skin is warm and dry. Neurological:      General: No focal deficit present. Mental Status: He is alert and oriented to person, place, and time. Mental status is at baseline. Sensory: Sensation is intact. Motor: Motor function is intact. Coordination: Coordination is intact. Gait: Gait is intact. Psychiatric:         Mood and Affect: Mood normal.         Behavior: Behavior normal.          MDM     VITAL SIGNS:  Patient Vitals for the past 4 hrs:   Temp Pulse Resp BP SpO2   03/04/22 2025     98 %   03/04/22 1916 98.3 °F (36.8 °C) 79 16 129/89 96 %         LABS:  No results found for this or any previous visit (from the past 6 hour(s)). IMAGING:  No orders to display         Medications During Visit:  Medications   carbamide peroxide (DEBROX) 6.5 % otic solution 5 Drop (5 Drops Both Ears Given 3/4/22 2022)   butalbital-acetaminophen-caffeine (FIORICET, ESGIC) -40 mg per tablet 2 Tablet (2 Tablets Oral Given 3/4/22 2021)   ibuprofen (MOTRIN) tablet 600 mg (600 mg Oral Given 3/4/22 2021)         DECISION MAKING:  Monisha De is a 46 y.o. male who comes in as above. Patient feeling significantly better after Fioricet.   Suspect tension type headache. Recommended as needed Fioricet at home and starting OTC antihistamine for ENT symptoms. Patient is already made an appointment to follow-up with otolaryngology. The clinical decision making for this encounter included ordering and interpreting the above diagnostic tests with comparison to prior studies that are within our EMR. Past medical and surgical histories were reviewed, as were records from recent outpatient and emergency department visits. The above results discussed and reviewed with the patient. Patient verbalized understanding of the care plan, including any changes to current outpatient medication regimen, discussed disease process, symptom control, and follow-up care. Return precautions reviewed. IMPRESSION:  1. Ear pain, bilateral    2. Episodic tension-type headache, not intractable        DISPOSITION:  Discharged      Current Discharge Medication List      START taking these medications    Details   butalbital-acetaminophen-caff (Fioricet) -40 mg per capsule Take 1 Capsule by mouth every six (6) hours as needed for Headache. Qty: 25 Capsule, Refills: 0  Start date: 3/4/2022              Follow-up Information     Follow up With Specialties Details Why Contact Info    Ravi Darnell MD Internal Medicine Schedule an appointment as soon as possible for a visit  As needed [id number] [phone]      Follow up with ENT as planned                The patient is asked to follow-up with their primary care provider in the next several days. They are to call tomorrow for an appointment. The patient is asked to return promptly for any increased concerns or worsening of symptoms. They can return to this emergency department or any other emergency department.     Procedures

## 2022-03-18 PROBLEM — E11.9 DM2 (DIABETES MELLITUS, TYPE 2) (HCC): Status: ACTIVE | Noted: 2019-05-17

## 2022-03-18 PROBLEM — K57.90 DIVERTICULOSIS: Status: ACTIVE | Noted: 2019-06-04

## 2022-03-18 PROBLEM — G43.009 MIGRAINE WITHOUT AURA AND WITHOUT STATUS MIGRAINOSUS, NOT INTRACTABLE: Status: ACTIVE | Noted: 2020-11-30

## 2022-03-19 PROBLEM — K64.9 HEMORRHOIDS: Status: ACTIVE | Noted: 2019-06-04

## 2022-03-19 PROBLEM — I51.89 GRADE I DIASTOLIC DYSFUNCTION: Status: ACTIVE | Noted: 2019-05-31

## 2022-03-19 PROBLEM — H91.90 HEARING DEFICIT: Status: ACTIVE | Noted: 2020-09-25

## 2022-03-19 PROBLEM — L30.4 INTERTRIGO: Status: ACTIVE | Noted: 2019-05-17

## 2022-03-19 PROBLEM — R94.31 ABNORMAL EKG: Status: ACTIVE | Noted: 2019-05-17

## 2022-03-20 PROBLEM — R51.9 HEADACHE: Status: ACTIVE | Noted: 2020-11-06

## 2022-04-04 ENCOUNTER — OFFICE VISIT (OUTPATIENT)
Dept: ORTHOPEDIC SURGERY | Age: 52
End: 2022-04-04
Payer: MEDICAID

## 2022-04-04 VITALS — HEIGHT: 74 IN | BODY MASS INDEX: 31.44 KG/M2 | WEIGHT: 245 LBS

## 2022-04-04 DIAGNOSIS — S83.242A ACUTE MEDIAL MENISCUS TEAR OF LEFT KNEE, INITIAL ENCOUNTER: ICD-10-CM

## 2022-04-04 DIAGNOSIS — M25.569 KNEE PAIN, UNSPECIFIED CHRONICITY, UNSPECIFIED LATERALITY: Primary | ICD-10-CM

## 2022-04-04 PROCEDURE — 99204 OFFICE O/P NEW MOD 45 MIN: CPT | Performed by: ORTHOPAEDIC SURGERY

## 2022-04-04 RX ORDER — MELOXICAM 7.5 MG/1
7.5 TABLET ORAL DAILY
Qty: 30 TABLET | Refills: 3 | Status: SHIPPED | OUTPATIENT
Start: 2022-04-04 | End: 2022-08-01

## 2022-04-04 NOTE — PROGRESS NOTES
ASSESSMENT/PLAN:  Below is the assessment and plan developed based on review of pertinent history, physical exam, labs, studies, and medications. 1. Knee pain, unspecified chronicity, unspecified laterality  -     XR KNEE LT MIN 4 V; Future  2. Acute medial meniscus tear of left knee, initial encounter  -     MRI KNEE LT WO CONT; Future      Return for MRI results of left knee. In discussion with the patient, we considered the numerus possible diagnoses that could be contributing to their present symptoms. We also deliberated on the extensive management options that must be considered to treat their current condition. We reviewed their accessible prior medical records, diagnostic tests, and current health and employment information. We considered how these symptoms were affecting the patient´s activities of daily living as well as employment and fitness activities. The patient had various questions regarding the possible risks, benefits, complications, morbidity and mortality regarding their diagnosis and treatment options. The patients´ comorbidities were considered, and I advocated that they consider maximizing lifestyle modification through nutrition and exercise to aid in addressing their symptoms. Shared decision making yielded an understanding to move forward with conservation treatment preferences. The patient expressed understanding that if conservative management fails to alleviate the present symptoms they will return to office for re-evaluation and consideration of additional diagnostic tests and potential surgical options. IN the interim, we have recommended ice, elevation, and anti-inflammatory medications along with a physician directed home exercise program. We discussed the risks and common side effects of anti-inflammatory medications and instructed the patient to discontinue the medication and contact us if they experienced any side effects.  The patient was encouraged to discuss the possible side effects with their family physician or pharmacist prior to initiating any new medications. Long discussion had with the patient regarding his left knee pain. We reviewed his history, physical exam, and radiographs. Overall his radiographs look good with minimal degenerative changes. Based on his symptoms there is concern for possible medial meniscus tear. We reviewed potential treatment options including activity modification, physical therapy, anti-inflammatory meds, oral steroids, injections, and/or MRI. At this point he would like to obtain an MRI to evaluate for medial meniscus tear or other intra-articular pathology which is reasonable. Also we will go ahead and switch him over to meloxicam.  We sent in this prescription to his pharmacy. We reviewed potential side effects of Mix 2 inhibitor/anti-inflammatory medications. He can schedule the MRI at his convenience. We will plan to see him back few days after his MRIs been completed so we had a chance to review it. At which point we can discuss the findings and further recommendations. In the interim encouraged him to continue ice and elevate the knee. He can take the meloxicam which should be available today. He should avoid any high impact exercising or cutting and pivoting such as soccer until we see him back for the MRI results. SUBJECTIVE/OBJECTIVE:  Patrick Sepulveda (: 1970) is a 46 y.o. male, patient,here for evaluation of the Knee Pain (left knee)  . Patient presents today for evaluation of the left knee. States about 3 to 4 days ago he started developing some increasing pain along the anterior and anterior medial aspect of his knee. He does not recall any injury or inciting event. He likes to play soccer with some friends. 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 He has been having some discomfort and it does cause a limp.   He does not recall any instances of instability or buckling. Prolonged standing or driving does not increase his knee pain. He has not taken any medications at this point. He does note that he had a similar condition in his right knee 3 to 4 years ago. He does not recall who he saw. He did try some medications and states that his knee improved on its own. He denies any numbness or tingling in the extremity. Denies any hip or groin pain. He is a diabetic so would like to avoid any type of steroid medications including injections. Physical Exam    General:  Well-nourished well-developed male. Alert and oriented. No acute distress. Normal affect and mood. Antalgic gait due to left knee pain. Not using any type of assistive devices to ambulate. Left lower extremity:  Skin is intact about the knee. No atrophy or deformity. There is a small effusion and Baker's cyst noted. He is tender palpation over the medial joint line. Positive patellar grind test.  Mild crepitus of the patella with active and passive range of motion of the knee. Able to fully extend and flex to about 110 degrees. Passively can flex about 130 with discomfort. Nontender palpation of the quadriceps tendon, patellar tendon, tibial tubercle, Gerdy's tubercle, pes anserine, lateral joint line, hamstrings, or proximal fibula. Conclusion Jeanie. Knee is stable to varus and valgus stress. Negative Lachman and posterior drawer solid endpoints. No groin or knee pain with internal or external rotation of the hip. Motor and sensory intact distally. Foot is warm well perfused. Palpable DP and PT pulses. Compartments are soft and compressible. Right lower extremity:  No deformity or atrophy about the extremity. No effusion. Knee fully extends and flexes to about 130 degrees. Is stable with varus and valgus stress. Negative Lachman and posterior drawer solid endpoints. No groin or knee pain with internal or external rotation of the hip.   Motor and sensory intact distally. Palpable DP PT pulses. Compartments soft and compressible. Imagin views of the left knee including weightbearing films are negative for fractures or dislocations. There are some mild medial joint space narrowing but no osteophyte formation. No significant effusion. No Known Allergies    Current Outpatient Medications   Medication Sig    meloxicam (MOBIC) 7.5 mg tablet Take 1 Tablet by mouth daily for 30 days.  metFORMIN ER (GLUCOPHAGE XR) 500 mg tablet TAKE 1 TABLET BY MOUTH DAILY WITH DINNER    metFORMIN (GLUCOPHAGE) 500 mg tablet TAKE 1 TABLET BY MOUTH DAILY WITH DINNER    atorvastatin (LIPITOR) 10 mg tablet Take 1 Tablet by mouth daily.  losartan (COZAAR) 100 mg tablet Take 1 Tab by mouth daily.  acetaminophen (TYLENOL) 500 mg tablet Take 2 Tabs by mouth every six (6) hours as needed for Pain or Fever.  ibuprofen (MOTRIN) 600 mg tablet Take 1 Tab by mouth every six (6) hours as needed for Pain (fever).  clotrimazole-betamethasone (LOTRISONE) topical cream Apply  to affected area two (2) times a day.  Blood-Glucose Meter (ONETOUCH VERIO FLEX) misc Used to test blood sugars 3 times daily    glucose blood VI test strips (ONETOUCH VERIO) strip Used to test blood sugar 3 times daily. No current facility-administered medications for this visit. Past Medical History:   Diagnosis Date    Abnormal EKG 2019    Diverticulosis 2019    [facility]    Dyslipidemia     Grade I diastolic dysfunction     echo    Headache 2020    Hemorrhoids 2019    HTN (hypertension)     Low back pain     Migraine without aura and without status migrainosus, not intractable 2020    Matthias Doyle DO - 20 - Normal MRI of the brain    Obesity (BMI 30.0-34. 9)     Obesity (BMI 30.0-34. 9)     Preventative health care     S/P colonoscopy 2019    md viry -10 yrs -hemorrhoids,tics    Tinnitus of both ears        Past Surgical History:   Procedure Laterality Date    COLONOSCOPY N/A 6/28/2019    COLONOSCOPY performed by Lakshmi Pryor MD at [facility] ENDOSCOPY       Family History   Problem Relation Age of Onset    Diabetes Mother        Social History     Socioeconomic History    Marital status:      Spouse name: Not on file    Number of children: Not on file    Years of education: Not on file    Highest education level: Not on file   Occupational History    Not on file   Tobacco Use    Smoking status: Never Smoker    Smokeless tobacco: Never Used   Vaping Use    Vaping Use: Never used   Substance and Sexual Activity    Alcohol use: Never    Drug use: Never    Sexual activity: Yes     Partners: Female     Birth control/protection: None   Other Topics Concern    Not on file   Social History Narrative    Habits:  He is a lifetime nonsmoker, non drinker, non drug abuser.         Social History:  The patient is , lives with his wife. He was born in [place]. He has been in the [address] [place], [place] [address] since 2005. They have three children, two daughters ages 9 and 11, and a son age 3. He completed his ALIA at [employer]. He was in [employer] for five years. 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 Pentecostal background is Latter day.         Family History:  Father 76, alive and well. Mother 67 with diabetes. Six brothers and four sisters are alive and well. Social Determinants of Health     Financial Resource Strain:     Difficulty of Paying Living Expenses: Not on file   Food Insecurity:     Worried About Running Out of Food in the Last Year: Not on file    Selin of Food in the Last Year: Not on file   Transportation Needs:     Lack of Transportation (Medical): Not on file    Lack of Transportation (Non-Medical):  Not on file   Physical Activity:     Days of Exercise per Week: Not on file    Minutes of Exercise per Session: Not on file   Stress:     Feeling of Stress : Not on file   Social Connections:     Frequency of Communication with Friends and Family: Not on file    Frequency of Social Gatherings with Friends and Family: Not on file    Attends Catholic Services: Not on file    Active Member of Clubs or Organizations: Not on file    Attends Club or Organization Meetings: Not on file    Marital Status: Not on file   Intimate Partner Violence:     Fear of Current or Ex-Partner: Not on file    Emotionally Abused: Not on file    Physically Abused: Not on file    Sexually Abused: Not on file   Housing Stability:     Unable to Pay for Housing in the Last Year: Not on file    Number of [place] in the Last Year: Not on file    Unstable Housing in the Last Year: Not on file       Review of Systems    No flowsheet data found. Vitals:  Ht 6' 2\" (1.88 m)   Wt 245 lb (111.1 kg)   BMI 31.46 kg/m²    Body mass index is 31.46 kg/m². An electronic signature was used to authenticate this note.   -- Сергей Alvares MD

## 2022-04-27 ENCOUNTER — OFFICE VISIT (OUTPATIENT)
Dept: ORTHOPEDIC SURGERY | Age: 52
End: 2022-04-27
Payer: MEDICAID

## 2022-04-27 DIAGNOSIS — S83.242A ACUTE MEDIAL MENISCUS TEAR OF LEFT KNEE, INITIAL ENCOUNTER: Primary | ICD-10-CM

## 2022-04-27 PROCEDURE — 99214 OFFICE O/P EST MOD 30 MIN: CPT | Performed by: ORTHOPAEDIC SURGERY

## 2022-04-27 NOTE — PROGRESS NOTES
ASSESSMENT/PLAN:  Below is the assessment and plan developed based on review of pertinent history, physical exam, labs, studies, and medications. 1. Knee pain, unspecified chronicity, unspecified laterality  -     XR KNEE LT MIN 4 V; Future  2. Acute medial meniscus tear of left knee, initial encounter  -     MRI KNEE LT WO CONT; Future      Return for MRI results of left knee. In discussion with the patient, we considered the numerus possible diagnoses that could be contributing to their present symptoms. We also deliberated on the extensive management options that must be considered to treat their current condition. We reviewed their accessible prior medical records, diagnostic tests, and current health and employment information. We considered how these symptoms were affecting the patient´s activities of daily living as well as employment and fitness activities. The patient had various questions regarding the possible risks, benefits, complications, morbidity and mortality regarding their diagnosis and treatment options. The patients´ comorbidities were considered, and I advocated that they consider maximizing lifestyle modification through nutrition and exercise to aid in addressing their symptoms. Shared decision making yielded an understanding to move forward with conservation treatment preferences. The patient expressed understanding that if conservative management fails to alleviate the present symptoms they will return to office for re-evaluation and consideration of additional diagnostic tests and potential surgical options. In the interim, we have recommended ice, elevation, and take prescription anti-inflammatory medications along with a physician directed home exercise program. We discussed the risks and common side effects of anti-inflammatory medications and instructed the patient to discontinue the medication and contact us if they experienced any side effects.  The patient was encouraged to discuss the possible side effects with their family physician or pharmacist prior to initiating any new medications. We discussed the fact that many of the recommended treatment options presented are significantly limited by the patient´s social determinants of health. We also reviewed the circumstances surrounding the environment that they live and work which affect a wide range of health risk. We considered the limited access to appropriate educational resources regarding proper nutrition and exercise as well as the economic and social support necessary to maintain health and wellbeing. Given that the patient's symptoms are increasing in frequency and duration we have decided to prescribe physical therapy. We talked about the fact that the goal of physical therapy is for the therapist to assist in developing a program to help return the patient to full strength, function and mobility and decrease pain. We also discussed that the therapist may combine several techniques to help decrease pain. These include but are not limited to stretching, balance exercises, strength training, massage, cold and heat therapy, and electrical stimulation. Although, physical therapy is generally safe, we went over the potential risks to include the worsening of pre-existing conditions, continued pain and no improvement in flexibility, mobility, and strength. We will have the patient follow up after physical therapy to closely monitor their progress. We talked about following up sooner if therapy is not progressing on a weekly basis. We talked about the fact that he did have a medial meniscus tear as well as some medial and patellofemoral compartment chondromalacia. We will see how he responds to the anti-inflammatory as well as physical therapy. I did offer him an injection today which he declined. Happy to see him back in the future.     SUBJECTIVE/OBJECTIVE:  Donna Montes De Oca (: 1970) is a 46 y.o. male, patient,here for evaluation of the Knee Pain (left knee)  . Patient presents today for evaluation of the left knee. States about 3 to 4 days ago he started developing some increasing pain along the anterior and anterior medial aspect of his knee. He does not recall any injury or inciting event. He likes to play soccer with some friends. 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 He has been having some discomfort and it does cause a limp. He does not recall any instances of instability or buckling. Prolonged standing or driving does not increase his knee pain. He has not taken any medications at this point. He does note that he had a similar condition in his right knee 3 to 4 years ago. He does not recall who he saw. He did try some medications and states that his knee improved on its own. He denies any numbness or tingling in the extremity. Denies any hip or groin pain. He is a diabetic so would like to avoid any type of steroid medications including injections. Physical Exam    General:  Well-nourished well-developed male. Alert and oriented. No acute distress. Normal affect and mood. Antalgic gait due to left knee pain. Not using any type of assistive devices to ambulate. Left lower extremity:  Skin is intact about the knee. No atrophy or deformity. There is a small effusion and Baker's cyst noted. He is tender palpation over the medial joint line. Positive patellar grind test.  Mild crepitus of the patella with active and passive range of motion of the knee. Able to fully extend and flex to about 110 degrees. Passively can flex about 130 with discomfort. Nontender palpation of the quadriceps tendon, patellar tendon, tibial tubercle, Gerdy's tubercle, pes anserine, lateral joint line, hamstrings, or proximal fibula. Conclusion Jeanie. Knee is stable to varus and valgus stress. Negative Lachman and posterior drawer solid endpoints.   No groin or knee pain with internal or external rotation of the hip. Motor and sensory intact distally. Foot is warm well perfused. Palpable DP and PT pulses. Compartments are soft and compressible. Right lower extremity:  No deformity or atrophy about the extremity. No effusion. Knee fully extends and flexes to about 130 degrees. Is stable with varus and valgus stress. Negative Lachman and posterior drawer solid endpoints. No groin or knee pain with internal or external rotation of the hip. Motor and sensory intact distally. Palpable DP PT pulses. Compartments soft and compressible. Imaging:    I independently reviewed the images and report. MRI KNEE LT WO CONT  Narrative: EXAM: MRI KNEE LT WO CONT    INDICATION: Pain    COMPARISON: Radiographs 4/4/2022    TECHNIQUE: Axial T2 fat-saturated and proton density fat-saturated; coronal T1  and proton density fat-saturated; and sagittal T2 fat-saturated, proton density  fat-saturated, and gradient echo MRI of the left knee . CONTRAST: None. FINDINGS: Bone marrow: Minimal edema-like signal shown peripherally at the  anterior weightbearing portion of the medial femoral condyle. No acute fracture,  dislocation, or marrow replacing process. Joint fluid: Moderate knee effusion. Ruptured Baker's cyst with minimal  residual.     Collateral ligaments and posterior, lateral corner: Intact. Medial meniscus: Grade 2 signal at junction of body and posterior horn. Mild  peripheral subluxation of body. Lateral meniscus: Intact. ACL and PCL: Intact. Tendons: Intact. Muscles: Within normal limits. Patellofemoral alignment: No patellar subluxation/tilt. Trochlear groove is not  hypoplastic. TT-TG distance: Normal.    Articular cartilage: Grade 3-4 chondral defect shown anteriorly in the medial  femoral condyle measuring 11 mm AP and 6 mm transverse.  There is a grade 2  fibrillar derangement in medial and lateral patellar facets and trochlear  groove. Soft tissue mass: None. Impression: 1. 11 x 6 mm grade 3-4 chondral defect of the anterior weightbearing medial  femoral condyle. 2. Suspect tearing within posterior body of medial meniscus. 3. Fibrillar grade II chondromalacia patellofemoral compartment. 4. Moderate knee effusion. Ruptured Baker's cyst with tiny residual.        No Known Allergies    Current Outpatient Medications   Medication Sig    meloxicam (MOBIC) 7.5 mg tablet Take 1 Tablet by mouth daily for 30 days.  metFORMIN ER (GLUCOPHAGE XR) 500 mg tablet TAKE 1 TABLET BY MOUTH DAILY WITH DINNER    metFORMIN (GLUCOPHAGE) 500 mg tablet TAKE 1 TABLET BY MOUTH DAILY WITH DINNER    atorvastatin (LIPITOR) 10 mg tablet Take 1 Tablet by mouth daily.  losartan (COZAAR) 100 mg tablet Take 1 Tab by mouth daily.  acetaminophen (TYLENOL) 500 mg tablet Take 2 Tabs by mouth every six (6) hours as needed for Pain or Fever.  ibuprofen (MOTRIN) 600 mg tablet Take 1 Tab by mouth every six (6) hours as needed for Pain (fever).  clotrimazole-betamethasone (LOTRISONE) topical cream Apply  to affected area two (2) times a day.  Blood-Glucose Meter (ONETOUCH VERIO FLEX) misc Used to test blood sugars 3 times daily    glucose blood VI test strips (ONETOUCH VERIO) strip Used to test blood sugar 3 times daily. No current facility-administered medications for this visit. Past Medical History:   Diagnosis Date    Abnormal EKG 05/17/2019    Diverticulosis 06/04/2019    DM2 (diabetes mellitus, type 2) ([facility] 05/17/2019    Dyslipidemia     Grade I diastolic dysfunction 08/28/5517    echo    Headache 11/06/2020    Hemorrhoids 06/04/2019    HTN (hypertension)     Low back pain     Migraine without aura and without status migrainosus, not intractable 11/30/2020    Cris Mendoza, DO - 12/4/20 - Normal MRI of the brain    Obesity (BMI 30.0-34. 9)     Obesity (BMI 30.0-34. 9)     Preventative health care     S/P colonoscopy 06/28/2019    md viry -10 yrs -hemorrhoids,tics    Tinnitus of both ears        Past Surgical History:   Procedure Laterality Date    COLONOSCOPY N/A 6/28/2019    COL[facility] Blake [facility] MD at [facility] ENDOSCOPY       Family History   Problem Relation Age of Onset    Diabetes Mother        Social History     Socioeconomic History    Marital status:      Spouse name: Not on file    Number of children: Not on file    Years of education: Not on file    Highest education level: Not on file   Occupational History    Not on file   Tobacco Use    Smoking status: Never Smoker    Smokeless tobacco: Never Used   Vaping Use    Vaping Use: Never used   Substance and Sexual Activity    Alcohol use: Never    Drug use: Never    Sexual activity: Yes     Partners: Female     Birth control/protection: None   Other Topics Concern    Not on file   Social History Narrative    Habits:  He is a lifetime nonsmoker, non drinker, non drug abuser.         Social History:  The patient is , lives with his wife. He was born in [place]. He has been in the [address] [place], [place] [address] since 2005. They have three children, two daughters ages 9 and 11, and a son age 3. He completed his ALIA at [employer]. He was in [employer] for five years. 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 Jewish background is Religion.         Family History:  Father 76, alive and well. Mother 67 with diabetes. Six brothers and four sisters are alive and well. Social Determinants of Health     Financial Resource Strain:     Difficulty of Paying Living Expenses: Not on file   Food Insecurity:     Worried About Running Out of Food in the Last Year: Not on file    Selin of Food in the Last Year: Not on file   Transportation Needs:     Lack of Transportation (Medical): Not on file    Lack of Transportation (Non-Medical):  Not on file   Physical Activity:     Days of Exercise per Week: Not on file    Minutes of Exercise per Session: Not on file   Stress:     Feeling of Stress : Not on file   Social Connections:     Frequency of Communication with Friends and Family: Not on file    Frequency of Social Gatherings with Friends and Family: Not on file    Attends Jewish Services: Not on file    Active Member of Clubs or Organizations: Not on file    Attends Club or Organization Meetings: Not on file    Marital Status: Not on file   Intimate Partner Violence:     Fear of Current or Ex-Partner: Not on file    Emotionally Abused: Not on file    Physically Abused: Not on file    Sexually Abused: Not on file   Housing Stability:     Unable to Pay for Housing in the Last Year: Not on file    Number of [place] in the Last Year: Not on file    Unstable Housing in the Last Year: Not on file       Review of Systems    No flowsheet data found. Vitals:  Ht 6' 2\" (1.88 m)   Wt 245 lb (111.1 kg)   BMI 31.46 kg/m²    Body mass index is 31.46 kg/m². An electronic signature was used to authenticate this note.   -- Duc Alcantara MD

## 2022-05-03 NOTE — PROGRESS NOTES
ASSESSMENT/PLAN:  Below is the assessment and plan developed based on review of pertinent history, physical exam, labs, studies, and medications. 1. Knee pain, unspecified chronicity, unspecified laterality  -     XR KNEE LT MIN 4 V; Future  2. Acute medial meniscus tear of left knee, initial encounter  -     MRI KNEE LT WO CONT; Future      Return for MRI results of left knee. In discussion with the patient, we considered the numerus possible diagnoses that could be contributing to their present symptoms. We also deliberated on the extensive management options that must be considered to treat their current condition. We reviewed their accessible prior medical records, diagnostic tests, and current health and employment information. We considered how these symptoms were affecting the patient´s activities of daily living as well as employment and fitness activities. The patient had various questions regarding the possible risks, benefits, complications, morbidity and mortality regarding their diagnosis and treatment options. The patients´ comorbidities were considered, and I advocated that they consider maximizing lifestyle modification through nutrition and exercise to aid in addressing their symptoms. Shared decision making yielded an understanding to move forward with conservation treatment preferences. The patient expressed understanding that if conservative management fails to alleviate the present symptoms they will return to office for re-evaluation and consideration of additional diagnostic tests and potential surgical options. In the interim, we have recommended ice, elevation, and take prescription anti-inflammatory medications along with a physician directed home exercise program. We discussed the risks and common side effects of anti-inflammatory medications and instructed the patient to discontinue the medication and contact us if they experienced any side effects.  The patient was encouraged to discuss the possible side effects with their family physician or pharmacist prior to initiating any new medications. We discussed the fact that many of the recommended treatment options presented are significantly limited by the patient´s social determinants of health. We also reviewed the circumstances surrounding the environment that they live and work which affect a wide range of health risk. We considered the limited access to appropriate educational resources regarding proper nutrition and exercise as well as the economic and social support necessary to maintain health and wellbeing. Given that the patient's symptoms are increasing in frequency and duration we have decided to prescribe physical therapy. We talked about the fact that the goal of physical therapy is for the therapist to assist in developing a program to help return the patient to full strength, function and mobility and decrease pain. We also discussed that the therapist may combine several techniques to help decrease pain. These include but are not limited to stretching, balance exercises, strength training, massage, cold and heat therapy, and electrical stimulation. Although, physical therapy is generally safe, we went over the potential risks to include the worsening of pre-existing conditions, continued pain and no improvement in flexibility, mobility, and strength. We will have the patient follow up after physical therapy to closely monitor their progress. We talked about following up sooner if therapy is not progressing on a weekly basis. We discussed the possibility of an injection of a steroid mixed with a local anesthetic to relieve pain and decrease inflammation in the left knee.  The risks of a steroid injection which include but are not limited to cartilage damage, death of nearby bone, joint infection, nerve damage, temporary facial flushing, temporary steroid flare of pain and inflammation in the joint, temporary increase in blood sugar, tendon weakening or rupture, thinning of nearby bone (osteoporosis), thinning of skin and soft tissue around the injection site, and whitening or lightening of the skin around the injection site were reviewed at length. We specifically advised that patients with diabetes talk to their primary care to ensure they are safe for a steroid injection due to the transient increase in blood sugar associated with the injection. We discussed the chance of increased bleeding and bruising if the patient is on blood thinners or certain dietary supplements that have a blood-thinning effect. We advised patients that have an active infection, history of allergic reactions to steroids or take medications that may prohibit them from receiving a steroid injection to talk to their primary care physician before receiving and injection. We also talked about limiting the number of injections because these potential side effects increase with larger doses and repeated use. After explaining the risks and benefits of the procedure and obtaining verbal informed consent from the patient, the proposed area for injection was confirmed with the patient. After all questions and concerns were addressed, the skin was prepped with alcohol to reduce the chances of infection. The skin was anesthetized with a topical ethylene chloride spray and a mixture of two milliliters of Depo-Medrol (40mg/ml), one milliliter of Lidocaine and one milliliter of Marcaine was injected slowly into the intra-articular space of left knee in a sterile fashion without difficulty. The needle was removed and disposed of in a sterile container. The patient tolerated the injection well and a band-aid was placed on the skin. The patient was counseled on protecting the injection area, avoiding water submersion for 2 days, icing for pain relief and looking for signs and symptoms of infection.  We requested that the patient contact us if any symptoms persist greater than 48 hours after the injection. We talked about the fact that he did have a medial meniscus tear as well as some medial and patellofemoral compartment chondromalacia. We will see how he responds to the anti-inflammatory as well as physical therapy. I did offer him an injection today which he declined. Happy to see him back in the future. SUBJECTIVE/OBJECTIVE:  Patrick Sepulveda (: 1970) is a 46 y.o. male, patient,here for evaluation of the Knee Pain (left knee)  . Patient presents today for evaluation of the left knee. States about 3 to 4 days ago he started developing some increasing pain along the anterior and anterior medial aspect of his knee. He does not recall any injury or inciting event. He likes to play soccer with some friends. 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 He has been having some discomfort and it does cause a limp. He does not recall any instances of instability or buckling. Prolonged standing or driving does not increase his knee pain. He has not taken any medications at this point. He does note that he had a similar condition in his right knee 3 to 4 years ago. He does not recall who he saw. He did try some medications and states that his knee improved on its own. He denies any numbness or tingling in the extremity. Denies any hip or groin pain. He is a diabetic so would like to avoid any type of steroid medications including injections. Physical Exam    General:  Well-nourished well-developed male. Alert and oriented. No acute distress. Normal affect and mood. Antalgic gait due to left knee pain. Not using any type of assistive devices to ambulate. Left lower extremity:  Skin is intact about the knee. No atrophy or deformity. There is a small effusion and Baker's cyst noted. He is tender palpation over the medial joint line.   Positive patellar grind test.  Mild crepitus of the patella with active and passive range of motion of the knee. Able to fully extend and flex to about 110 degrees. Passively can flex about 130 with discomfort. Nontender palpation of the quadriceps tendon, patellar tendon, tibial tubercle, Gerdy's tubercle, pes anserine, lateral joint line, hamstrings, or proximal fibula. Conclusion Jeanie. Knee is stable to varus and valgus stress. Negative Lachman and posterior drawer solid endpoints. No groin or knee pain with internal or external rotation of the hip. Motor and sensory intact distally. Foot is warm well perfused. Palpable DP and PT pulses. Compartments are soft and compressible. Right lower extremity:  No deformity or atrophy about the extremity. No effusion. Knee fully extends and flexes to about 130 degrees. Is stable with varus and valgus stress. Negative Lachman and posterior drawer solid endpoints. No groin or knee pain with internal or external rotation of the hip. Motor and sensory intact distally. Palpable DP PT pulses. Compartments soft and compressible. Imaging:    I independently reviewed the images and report. MRI KNEE LT WO CONT  Narrative: EXAM: MRI KNEE LT WO CONT    INDICATION: Pain    COMPARISON: Radiographs 4/4/2022    TECHNIQUE: Axial T2 fat-saturated and proton density fat-saturated; coronal T1  and proton density fat-saturated; and sagittal T2 fat-saturated, proton density  fat-saturated, and gradient echo MRI of the left knee . CONTRAST: None. FINDINGS: Bone marrow: Minimal edema-like signal shown peripherally at the  anterior weightbearing portion of the medial femoral condyle. No acute fracture,  dislocation, or marrow replacing process. Joint fluid: Moderate knee effusion. Ruptured Baker's cyst with minimal  residual.     Collateral ligaments and posterior, lateral corner: Intact. Medial meniscus: Grade 2 signal at junction of body and posterior horn. Mild  peripheral subluxation of body.       Lateral meniscus: Intact. ACL and PCL: Intact. Tendons: Intact. Muscles: Within normal limits. Patellofemoral alignment: No patellar subluxation/tilt. Trochlear groove is not  hypoplastic. TT-TG distance: Normal.    Articular cartilage: Grade 3-4 chondral defect shown anteriorly in the medial  femoral condyle measuring 11 mm AP and 6 mm transverse. There is a grade 2  fibrillar derangement in medial and lateral patellar facets and trochlear  groove. Soft tissue mass: None. Impression: 1. 11 x 6 mm grade 3-4 chondral defect of the anterior weightbearing medial  femoral condyle. 2. Suspect tearing within posterior body of medial meniscus. 3. Fibrillar grade II chondromalacia patellofemoral compartment. 4. Moderate knee effusion. Ruptured Baker's cyst with tiny residual.        No Known Allergies    Current Outpatient Medications   Medication Sig    meloxicam (MOBIC) 7.5 mg tablet Take 1 Tablet by mouth daily for 30 days.  metFORMIN ER (GLUCOPHAGE XR) 500 mg tablet TAKE 1 TABLET BY MOUTH DAILY WITH DINNER    metFORMIN (GLUCOPHAGE) 500 mg tablet TAKE 1 TABLET BY MOUTH DAILY WITH DINNER    atorvastatin (LIPITOR) 10 mg tablet Take 1 Tablet by mouth daily.  losartan (COZAAR) 100 mg tablet Take 1 Tab by mouth daily.  acetaminophen (TYLENOL) 500 mg tablet Take 2 Tabs by mouth every six (6) hours as needed for Pain or Fever.  ibuprofen (MOTRIN) 600 mg tablet Take 1 Tab by mouth every six (6) hours as needed for Pain (fever).  clotrimazole-betamethasone (LOTRISONE) topical cream Apply  to affected area two (2) times a day.  Blood-Glucose Meter (ONETOUCH VERIO FLEX) misc Used to test blood sugars 3 times daily    glucose blood VI test strips (ONETOUCH VERIO) strip Used to test blood sugar 3 times daily. No current facility-administered medications for this visit.        Past Medical History:   Diagnosis Date    Abnormal EKG 05/17/2019    Diverticulosis 06/04/2019    DM2 (diabetes mellitus, type 2) ([facility] 05/17/2019    Dyslipidemia     Grade I diastolic dysfunction 73/50/0874    echo    Headache 11/06/2020    Hemorrhoids 06/04/2019    HTN (hypertension)     Low back pain     Migraine without aura and without status migrainosus, not intractable 11/30/2020    Taya Álvarez,  - 12/4/20 - Normal MRI of the brain    Obesity (BMI 30.0-34. 9)     Obesity (BMI 30.0-34. 9)     Preventative health care     S/P colonoscopy 06/28/2019    md viry -10 yrs -hemorrhoids,tics    Tinnitus of both ears        Past Surgical History:   Procedure Laterality Date    COLONOSCOPY N/A 6/28/2019    COLONOSCOPY performed by Tarah Chávez MD at [facility] ENDOSCOPY       Family History   Problem Relation Age of Onset    Diabetes Mother        Social History     Socioeconomic History    Marital status:      Spouse name: Not on file    Number of children: Not on file    Years of education: Not on file    Highest education level: Not on file   Occupational History    Not on file   Tobacco Use    Smoking status: Never Smoker    Smokeless tobacco: Never Used   Vaping Use    Vaping Use: Never used   Substance and Sexual Activity    Alcohol use: Never    Drug use: Never    Sexual activity: Yes     Partners: Female     Birth control/protection: None   Other Topics Concern    Not on file   Social History Narrative    Habits:  He is a lifetime nonsmoker, non drinker, non drug abuser.         Social History:  The patient is , lives with his wife. He was born in [place]. He has been in the [address] [place] [address],[address] since 2005. They have three children, two daughters ages 9 and 11, and a son age 3. He completed his ALIA at [employer]. He was in [facility] for five years. 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 Quaker background is Taoist.         Family History:  Father 76, alive and well. Mother 67 with diabetes. Six brothers and four sisters are alive and well.      Social Determinants of Health     Financial Resource Strain:     Difficulty of Paying Living Expenses: Not on file   Food Insecurity:     Worried About Running Out of Food in the Last Year: Not on file    Selin of Food in the Last Year: Not on file   Transportation Needs:     Lack of Transportation (Medical): Not on file    Lack of Transportation (Non-Medical): Not on file   Physical Activity:     Days of Exercise per Week: Not on file    Minutes of Exercise per Session: Not on file   Stress:     Feeling of Stress : Not on file   Social Connections:     Frequency of Communication with Friends and Family: Not on file    Frequency of Social Gatherings with Friends and Family: Not on file    Attends Adventist Services: Not on file    Active Member of [address] [place] [address] or Organizations: Not on file    Attends Club or Organization Meetings: Not on file    Marital Status: Not on file   Intimate Partner Violence:     Fear of Current or Ex-Partner: Not on file    Emotionally Abused: Not on file    Physically Abused: Not on file    Sexually Abused: Not on file   Housing Stability:     Unable to Pay for Housing in the Last Year: Not on file    Number of [place] in the Last Year: Not on file    Unstable Housing in the Last Year: Not on file       Review of Systems    No flowsheet data found. Vitals:  Ht 6' 2\" (1.88 m)   Wt 245 lb (111.1 kg)   BMI 31.46 kg/m²    Body mass index is 31.46 kg/m². An electronic signature was used to authenticate this note.   -- Mark Pitts MD

## 2022-05-04 ENCOUNTER — OFFICE VISIT (OUTPATIENT)
Dept: ORTHOPEDIC SURGERY | Age: 52
End: 2022-05-04
Payer: MEDICAID

## 2022-05-04 VITALS — BODY MASS INDEX: 31.44 KG/M2 | WEIGHT: 245 LBS | HEIGHT: 74 IN

## 2022-05-04 DIAGNOSIS — S83.242A ACUTE MEDIAL MENISCUS TEAR OF LEFT KNEE, INITIAL ENCOUNTER: Primary | ICD-10-CM

## 2022-05-04 PROCEDURE — 20610 DRAIN/INJ JOINT/BURSA W/O US: CPT | Performed by: ORTHOPAEDIC SURGERY

## 2022-05-04 RX ORDER — METFORMIN HYDROCHLORIDE 500 MG/1
TABLET, EXTENDED RELEASE ORAL
Qty: 30 TABLET | Refills: 11 | Status: SHIPPED | OUTPATIENT
Start: 2022-05-04 | End: 2022-10-21 | Stop reason: SDUPTHER

## 2022-05-04 RX ORDER — BUPIVACAINE HYDROCHLORIDE 5 MG/ML
2 INJECTION, SOLUTION EPIDURAL; INTRACAUDAL ONCE
Status: COMPLETED | OUTPATIENT
Start: 2022-05-04 | End: 2022-05-04

## 2022-05-04 RX ORDER — TRIAMCINOLONE ACETONIDE 40 MG/ML
40 INJECTION, SUSPENSION INTRA-ARTICULAR; INTRAMUSCULAR ONCE
Status: COMPLETED | OUTPATIENT
Start: 2022-05-04 | End: 2022-05-04

## 2022-05-04 RX ORDER — METFORMIN HYDROCHLORIDE 500 MG/1
TABLET, EXTENDED RELEASE ORAL
Qty: 30 TABLET | Refills: 11 | Status: SHIPPED | OUTPATIENT
Start: 2022-05-04

## 2022-05-04 RX ADMIN — BUPIVACAINE HYDROCHLORIDE 10 MG: 5 INJECTION, SOLUTION EPIDURAL; INTRACAUDAL at 10:21

## 2022-05-04 RX ADMIN — TRIAMCINOLONE ACETONIDE 40 MG: 40 INJECTION, SUSPENSION INTRA-ARTICULAR; INTRAMUSCULAR at 10:21

## 2022-05-10 RX ORDER — DILTIAZEM HYDROCHLORIDE 180 MG/1
180 CAPSULE, COATED, EXTENDED RELEASE ORAL DAILY
Qty: 90 CAPSULE | Refills: 3 | Status: SHIPPED | OUTPATIENT
Start: 2022-05-10

## 2022-05-11 RX ORDER — LOSARTAN POTASSIUM 100 MG/1
100 TABLET ORAL DAILY
Qty: 90 TABLET | Refills: 3 | Status: SHIPPED | OUTPATIENT
Start: 2022-05-11

## 2022-05-11 RX ORDER — ATORVASTATIN CALCIUM 10 MG/1
10 TABLET, FILM COATED ORAL DAILY
Qty: 30 TABLET | Refills: 11 | Status: SHIPPED | OUTPATIENT
Start: 2022-05-11

## 2022-08-01 RX ORDER — MELOXICAM 7.5 MG/1
TABLET ORAL
Qty: 30 TABLET | Refills: 3 | Status: SHIPPED | OUTPATIENT
Start: 2022-08-01

## 2022-08-04 ENCOUNTER — HOSPITAL ENCOUNTER (EMERGENCY)
Age: 52
Discharge: HOME | End: 2022-08-04
Attending: EMERGENCY MEDICINE
Payer: MEDICAID

## 2022-08-04 VITALS
DIASTOLIC BLOOD PRESSURE: 98 MMHG | BODY MASS INDEX: 32.83 KG/M2 | HEART RATE: 64 BPM | WEIGHT: 255.73 LBS | OXYGEN SATURATION: 99 % | RESPIRATION RATE: 16 BRPM | SYSTOLIC BLOOD PRESSURE: 146 MMHG | TEMPERATURE: 97.5 F

## 2022-08-04 DIAGNOSIS — S81.852A DOG BITE OF LEFT LOWER LEG, INITIAL ENCOUNTER: Primary | ICD-10-CM
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
## 2022-08-04 PROCEDURE — 99284 EMERGENCY DEPT VISIT MOD MDM: CPT

## 2022-08-04 PROCEDURE — 90471 IMMUNIZATION ADMIN: CPT

## 2022-08-04 PROCEDURE — 90714 TD VACC NO PRESV 7 YRS+ IM: CPT | Performed by: EMERGENCY MEDICINE

## 2022-08-04 PROCEDURE — 74011250636 HC RX REV CODE- 250/636: Performed by: EMERGENCY MEDICINE

## 2022-08-04 RX ORDER — AMOXICILLIN AND CLAVULANATE POTASSIUM 875; 125 MG/1; MG/1
1 TABLET, FILM COATED ORAL 2 TIMES DAILY
Qty: 20 TABLET | Refills: 0 | Status: SHIPPED | OUTPATIENT
Start: 2022-08-04 | End: 2022-08-14

## 2022-08-04 RX ADMIN — TETANUS AND DIPHTHERIA TOXOIDS ADSORBED 0.5 ML: 2; 2 INJECTION INTRAMUSCULAR at 20:21

## 2022-08-04 NOTE — ED TRIAGE NOTES
Patient arrives to the ED with chief complaint of dog bite 2 hours ago. States the dog is up to date on vaccines. Denies pain, numbness and tingling to the leg.

## 2022-08-05 NOTE — ED PROVIDER NOTES
Pt is a 47 yo male with hx of diverticulosis, DM2, HLD, CHF with diastolic dysfunction, hemorrhoids, HTN who p/w dog bite. Pt says his friends dog was being playful and bit him in the left leg. Dog is fully vaccinated and can be observed. Pt agreeable to holding off on rabies vaccination series/immunoglobulin at this time. Past Medical History:   Diagnosis Date    Abnormal EKG 05/17/2019    Diverticulosis 06/04/2019    DM2 (diabetes mellitus[facility] type 2) ([facility] 05/17/2019    Dyslipidemia     Grade I diastolic dysfunction 43/16/7083    echo    Headache 11/06/2020    Hemorrhoids 06/04/2019    HTN (hypertension)     Low back pain     Migraine without aura and without status migrainosus, not intractable 11/30/2020    Real Briana, DO - 12/4/20 - Normal MRI of the brain    Obesity (BMI 30.0-34.9)     Obesity (BMI 30.0-34. 9)     Preventative health care     S/P colonoscopy 06/28/2019    md viry -10 yrs -hemorrhoids,tics    Tinnitus of both ears        Past Surgical History:   Procedure Laterality Date    COLONOSCOPY N/A 6/28/2019    COLONOSCOPY performed by Deandra Felix MD at [facility] ENDOSCOPY         Family History:   Problem Relation Age of Onset    Diabetes Mother        Social History     Socioeconomic History    Marital status:      Spouse name: Not on file    Number of children: Not on file    Years of education: Not on file    Highest education level: Not on file   Occupational History    Not on file   Tobacco Use    Smoking status: Never    Smokeless tobacco: Never   Vaping Use    Vaping Use: Never used   Substance and Sexual Activity    Alcohol use: Never    Drug use: Never    Sexual activity: Yes     Partners: Female     Birth control/protection: None   Other Topics Concern    Not on file   Social History Narrative    Habits:  He is a lifetime nonsmoker, non drinker, non drug abuser. Social History:  The patient is , lives with his wife. He was born in [place].   He has been in the [address] since 2005. They have three children, two daughters ages 9 and 11, and a son age 3. He completed his ALIA at [employer]. He was in [facility] for five years. 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 Yarsani background is Jew. Family History:  Father 76, alive and well. Mother 67 with diabetes. Six brothers and four sisters are alive and well. Social Determinants of Health     Financial Resource Strain: Not on file   Food Insecurity: Not on file   Transportation Needs: Not on file   Physical Activity: Not on file   Stress: Not on file   Social Connections: Not on file   Intimate Partner Violence: Not on file   Housing Stability: Not on file         ALLERGIES: Patient has no known allergies. Review of Systems   Constitutional:  Negative for chills and fever. HENT:  Negative for drooling and nosebleeds. Eyes:  Negative for pain and itching. Respiratory:  Negative for choking and stridor. Cardiovascular:  Negative for leg swelling. Gastrointestinal:  Negative for abdominal pain and rectal pain. Endocrine: Negative for heat intolerance and polyphagia. Genitourinary:  Negative for enuresis and genital sores. Musculoskeletal:  Negative for arthralgias and joint swelling. Skin:  Positive for wound. Negative for color change. Allergic/Immunologic: Negative for immunocompromised state. Neurological:  Negative for tremors and speech difficulty. Hematological:  Negative for adenopathy. Psychiatric/Behavioral:  Negative for dysphoric mood and sleep disturbance. Vitals:    08/04/22 1907   BP: (!) 146/98   Pulse: 64   Resp: 16   Temp: 97.5 °F (36.4 °C)   SpO2: 99%   Weight: 116 kg (255 lb 11.7 oz)            Physical Exam  Vitals and nursing note reviewed. Constitutional:       General: He is not in acute distress. Appearance: He is well-developed. He is not ill-appearing, toxic-appearing or diaphoretic.    HENT:      Head: Normocephalic and atraumatic. Nose: Nose normal.   Eyes:      Conjunctiva/sclera: Conjunctivae normal.   Cardiovascular:      Rate and Rhythm: Normal rate and regular rhythm. Heart sounds: Normal heart sounds. Pulmonary:      Effort: Pulmonary effort is normal. No respiratory distress. Breath sounds: Normal breath sounds. Abdominal:      General: There is no distension. Palpations: Abdomen is soft. Musculoskeletal:         General: Signs of injury present. No deformity. Normal range of motion. Cervical back: Normal range of motion and neck supple. Comments: 3 small superficial non bleeding abrasions to lateral and posterior aspect of left mid tib-fib. Skin:     General: Skin is warm and dry. Neurological:      Mental Status: He is alert. Coordination: Coordination normal.   Psychiatric:         Behavior: Behavior normal.        MDM  Number of Diagnoses or Management Options  Dog bite of left lower leg, initial encounter  Diagnosis management comments: Will update tetanus and prescribe abx. Pt will hold off on rabies/Ig and vaccine at time time. Procedures    Patient's results have been reviewed with them. Patient and/or family have verbally conveyed their understanding and agreement of the patient's signs, symptoms, diagnosis, treatment and prognosis and additionally agree to follow up as recommended or return to the Emergency Room should their condition change prior to follow-up. Discharge instructions have also been provided to the patient with some educational information regarding their diagnosis as well a list of reasons why they would want to return to the ER prior to their follow-up appointment should their condition change.

## 2022-08-05 NOTE — DISCHARGE INSTRUCTIONS
Please keep area clean with soap and water. Take antibiotics to prevent infection. Your tetanus was updated today. Please observe the dog to make sure it does not exhibit abnormal behavior for the next 10 days - you can return if you decide to get rabies vaccine and immunoglobulin. Thank you.

## 2022-08-05 NOTE — ED NOTES
Discharge and prescription instructions provided. Pt verbalized understanding. Opportunity provided for questions. Pt discharged home.

## 2022-09-07 RX ORDER — CLOTRIMAZOLE AND BETAMETHASONE DIPROPIONATE 10; .64 MG/G; MG/G
CREAM TOPICAL 2 TIMES DAILY
Qty: 45 G | Refills: 11 | Status: SHIPPED | OUTPATIENT
Start: 2022-09-07

## 2022-10-15 ENCOUNTER — HOSPITAL ENCOUNTER (EMERGENCY)
Age: 52
Discharge: HOME | End: 2022-10-16
Attending: EMERGENCY MEDICINE
Payer: MEDICAID

## 2022-10-15 DIAGNOSIS — R07.9 CHEST PAIN, UNSPECIFIED TYPE: ICD-10-CM

## 2022-10-15 DIAGNOSIS — I10 PRIMARY HYPERTENSION: Primary | ICD-10-CM

## 2022-10-15 LAB
ALBUMIN SERPL-MCNC: 3.8 G/DL (ref 3.5–5)
ALBUMIN/GLOB SERPL: 0.9 {RATIO} (ref 1.1–2.2)
ALP SERPL-CCNC: 84 U/L (ref 45–117)
ALT SERPL-CCNC: 36 U/L (ref 12–78)
ANION GAP SERPL CALC-SCNC: 5 MMOL/L (ref 5–15)
AST SERPL-CCNC: 16 U/L (ref 15–37)
BASOPHILS # BLD: 0.1 K/UL (ref 0–0.1)
BASOPHILS NFR BLD: 1 % (ref 0–1)
BILIRUB SERPL-MCNC: 0.5 MG/DL (ref 0.2–1)
BUN SERPL-MCNC: 14 MG/DL (ref 6–20)
BUN/CREAT SERPL: 13 (ref 12–20)
CALCIUM SERPL-MCNC: 9.2 MG/DL (ref 8.5–10.1)
CHLORIDE SERPL-SCNC: 107 MMOL/L (ref 97–108)
CO2 SERPL-SCNC: 26 MMOL/L (ref 21–32)
COMMENT, HOLDF: NORMAL
CREAT SERPL-MCNC: 1.04 MG/DL (ref 0.7–1.3)
DIFFERENTIAL METHOD BLD: ABNORMAL
EOSINOPHIL # BLD: 0 K/UL (ref 0–0.4)
EOSINOPHIL NFR BLD: 1 % (ref 0–7)
ERYTHROCYTE [DISTWIDTH] IN BLOOD BY AUTOMATED COUNT: 13.2 % (ref 11.5–14.5)
GLOBULIN SER CALC-MCNC: 4.1 G/DL (ref 2–4)
GLUCOSE SERPL-MCNC: 102 MG/DL (ref 65–100)
HCT VFR BLD AUTO: 45.9 % (ref 36.6–50.3)
HGB BLD-MCNC: 15.5 G/DL (ref 12.1–17)
IMM GRANULOCYTES # BLD AUTO: 0 K/UL (ref 0–0.04)
IMM GRANULOCYTES NFR BLD AUTO: 0 % (ref 0–0.5)
LYMPHOCYTES # BLD: 3.9 K/UL (ref 0.8–3.5)
LYMPHOCYTES NFR BLD: 52 % (ref 12–49)
MCH RBC QN AUTO: 27.4 PG (ref 26–34)
MCHC RBC AUTO-ENTMCNC: 33.8 G/DL (ref 30–36.5)
MCV RBC AUTO: 81.2 FL (ref 80–99)
MONOCYTES # BLD: 0.5 K/UL (ref 0–1)
MONOCYTES NFR BLD: 7 % (ref 5–13)
NEUTS SEG # BLD: 2.9 K/UL (ref 1.8–8)
NEUTS SEG NFR BLD: 39 % (ref 32–75)
NRBC # BLD: 0 K/UL (ref 0–0.01)
NRBC BLD-RTO: 0 PER 100 WBC
PLATELET # BLD AUTO: 294 K/UL (ref 150–400)
PMV BLD AUTO: 9.3 FL (ref 8.9–12.9)
POTASSIUM SERPL-SCNC: 3.8 MMOL/L (ref 3.5–5.1)
PROT SERPL-MCNC: 7.9 G/DL (ref 6.4–8.2)
RBC # BLD AUTO: 5.65 M/UL (ref 4.1–5.7)
SAMPLES BEING HELD,HOLD: NORMAL
SODIUM SERPL-SCNC: 138 MMOL/L (ref 136–145)
TROPONIN-HIGH SENSITIVITY: 5 NG/L (ref 0–76)
WBC # BLD AUTO: 7.4 K/UL (ref 4.1–11.1)

## 2022-10-15 PROCEDURE — 80053 COMPREHEN METABOLIC PANEL: CPT

## 2022-10-15 PROCEDURE — 84484 ASSAY OF TROPONIN QUANT: CPT

## 2022-10-15 PROCEDURE — 99284 EMERGENCY DEPT VISIT MOD MDM: CPT

## 2022-10-15 PROCEDURE — 36415 COLL VENOUS BLD VENIPUNCTURE: CPT

## 2022-10-15 PROCEDURE — 85025 COMPLETE CBC W/AUTO DIFF WBC: CPT

## 2022-10-15 RX ORDER — SODIUM CHLORIDE 0.9 % (FLUSH) 0.9 %
5-40 SYRINGE (ML) INJECTION AS NEEDED
Status: CANCELLED | OUTPATIENT
Start: 2022-10-15

## 2022-10-15 RX ORDER — SODIUM CHLORIDE 0.9 % (FLUSH) 0.9 %
5-40 SYRINGE (ML) INJECTION EVERY 8 HOURS
Status: CANCELLED | OUTPATIENT
Start: 2022-10-15

## 2022-10-15 NOTE — ED TRIAGE NOTES
Patient arrives with a CC of chest pain 6/10 and a headache that began today. The patient's BP is 214/146. Hx of diabetes.

## 2022-10-16 ENCOUNTER — APPOINTMENT (OUTPATIENT)
Dept: GENERAL RADIOLOGY | Age: 52
End: 2022-10-16
Attending: EMERGENCY MEDICINE
Payer: MEDICAID

## 2022-10-16 VITALS
HEART RATE: 68 BPM | OXYGEN SATURATION: 99 % | TEMPERATURE: 97.2 F | HEIGHT: 74 IN | RESPIRATION RATE: 16 BRPM | WEIGHT: 246.91 LBS | SYSTOLIC BLOOD PRESSURE: 146 MMHG | BODY MASS INDEX: 31.69 KG/M2 | DIASTOLIC BLOOD PRESSURE: 82 MMHG

## 2022-10-16 PROCEDURE — 71045 X-RAY EXAM CHEST 1 VIEW: CPT

## 2022-10-16 NOTE — ED PROVIDER NOTES
54-year-old male with PMHx of DM, HTN, diastolic dysfunction, obesity presents emergency department complaining of intermittent, gradual onset, diffuse headache and intermittent, \"pinching,\", nonradiating, nonpleuritic gradual onset chest pain, onset occurring at rest, since noon today. Patient reports that he missed his dose of antihypertensives earlier and measured his blood pressure which was SBP 190s. He notes that he took his blood pressure medication immediately prior to arrival, and since then his symptoms have resolved. He has no additional complaints at this time    The history is provided by the patient. Chest Pain (Angina)   This is a new problem. The current episode started 6 to 12 hours ago. The problem has been resolved. Duration of episode(s) is 12 hours. The problem occurs hourly. The pain is present in the substernal region. The pain is at a severity of 6/10. The quality of the pain is described as sharp. The pain does not radiate. Associated symptoms include headaches. Treatments tried: Blood pressure medications. The treatment provided significant relief. Risk factors include obesity, diabetes mellitus and hypertension. Past Medical History:   Diagnosis Date    Abnormal EKG 05/17/2019    Diverticulosis 06/04/2019    [facility] 05/17/2019    Dyslipidemia     Grade I diastolic dysfunction 23/43/2107    echo    Headache 11/06/2020    Hemorrhoids 06/04/2019    HTN (hypertension)     Low back pain     Migraine without aura and without status migrainosus, not intractable 11/30/2020    Ray Middleton DO - 12/4/20 - Normal MRI of the brain    Obesity (BMI 30.0-34.9)     Obesity (BMI 30.0-34. 9)     Preventative health care     S/P colonoscopy 06/28/2019    md viry -10 yrs -hemorrhoids,tics    Tinnitus of both ears        Past Surgical History:   Procedure Laterality Date    COLONOSCOPY N/A 6/28/2019    COLONOSCOPY performed by Ryan Carroll MD at hospitals ENDOSCOPY         Family History:   Problem Relation Age of Onset    Diabetes Mother        Social History     Socioeconomic History    Marital status:      Spouse name: Not on file    Number of children: Not on file    Years of education: Not on file    Highest education level: Not on file   Occupational History    Not on file   Tobacco Use    Smoking status: Never    Smokeless tobacco: Never   Vaping Use    Vaping Use: Never used   Substance and Sexual Activity    Alcohol use: Never    Drug use: Never    Sexual activity: Yes     Partners: Female     Birth control/protection: None   Other Topics Concern    Not on file   Social History Narrative    Habits:  He is a lifetime nonsmoker, non drinker, non drug abuser. Social History:  The patient is , lives with his wife. He was born in [place]. He has been in the [address] [place] [address],[address] since 2005. They have three children, two daughters ages 9 and 11, and a son age 3. He completed his ALIA at [employer]. He was in [facility] [employer] [facility] [employer] for five years. 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 Jewish background is Religion. Family History:  Father 76, alive and well. Mother 67 with diabetes. Six brothers and four sisters are alive and well. Social Determinants of Health     Financial Resource Strain: Not on file   Food Insecurity: Not on file   Transportation Needs: Not on file   Physical Activity: Not on file   Stress: Not on file   Social Connections: Not on file   Intimate Partner Violence: Not on file   Housing Stability: Not on file         ALLERGIES: Patient has no known allergies. Review of Systems   Constitutional: Negative. HENT: Negative. Eyes: Negative. Respiratory: Negative. Cardiovascular:  Positive for chest pain. Gastrointestinal: Negative. Endocrine: Negative. Genitourinary: Negative. Musculoskeletal: Negative. Skin: Negative. Neurological:  Positive for headaches. Psychiatric/Behavioral: Negative. Vitals:    10/15/22 1947   BP: (!) 214/146   Pulse: 68   Resp: 16   Temp: 97.2 °F (36.2 °C)   SpO2: 99%   Weight: 112 kg (246 lb 14.6 oz)   Height: 6' 2\" (1.88 m)            Physical Exam  Vitals and nursing note reviewed. Constitutional:       General: He is not in acute distress. Appearance: Normal appearance. He is not ill-appearing. HENT:      Head: Normocephalic and atraumatic. Nose: Nose normal.      Mouth/Throat:      Mouth: Mucous membranes are moist.   Eyes:      Extraocular Movements: Extraocular movements intact. Pupils: Pupils are equal, round, and reactive to light. Cardiovascular:      Rate and Rhythm: Normal rate and regular rhythm. Pulses: Normal pulses. Pulmonary:      Effort: Pulmonary effort is normal. No respiratory distress. Breath sounds: Normal breath sounds. Abdominal:      General: There is no distension. Palpations: Abdomen is soft. Tenderness: There is no abdominal tenderness. Musculoskeletal:         General: Normal range of motion. Cervical back: Normal range of motion. Skin:     General: Skin is warm and dry. Neurological:      General: No focal deficit present. Mental Status: He is alert and oriented to person, place, and time. Psychiatric:         Mood and Affect: Mood normal.        MDM  Number of Diagnoses or Management Options  Chest pain, unspecified type: new and requires workup  Primary hypertension: new and requires workup  Diagnosis management comments: DDx: ACS, pericarditis, myocarditis, musculoskeletal pain, anxiety, hypertension, hypertensive emergency    Plan:  - Labs: BMP, CBC, troponin  - Imaging: CXR  - Medications: Aspirin      Reassessment: Patient's evaluation reveals a EKG with nonspecific ST segment changes, but is otherwise reassuring. His symptoms resolved after he took his blood pressure medication and his SBP decreased from 200s to 140s during this visit. Discussed with him possible dispositions, and he stated that he preferred discharge with close follow-up with his PCP and cardiologist, which is reasonable. Will discharge at this time       Amount and/or Complexity of Data Reviewed  Clinical lab tests: ordered and reviewed  Tests in the radiology section of CPT®: ordered and reviewed  Tests in the medicine section of CPT®: ordered and reviewed  Review and summarize past medical records: yes  Independent visualization of images, tracings, or specimens: yes      ED Course as of 10/17/22 1624   Sat Oct 15, 2022   2025 This EKG was interpreted by me and 1946. Normal sinus rhythm at 65 bpm.  First-degree AV block with a TN interval of 236 ms. T wave inversions present in inferior leads and low lateral leads.  [JT]      ED Course User Index  [JT] Valencia Hummel MD       Procedures

## 2022-10-16 NOTE — ED NOTES
Patient received discharge instructions by MD. Reviewed discharge instructions with patient. Patient verbalized understanding of discharge teaching. Patient left ED via ambulatory. Patient reports relief of most intense pain.

## 2022-10-16 NOTE — DISCHARGE INSTRUCTIONS
You were seen in the emergency department for chest pain. The results of your tests including an EKG, imaging, and lab work, were normal.  Although an exact cause of your symptoms was not identified, the most likely cause is hypertension or muscle pain. Please take any medications prescribed at this visit as instructed. Please also follow-up with your PCP or return to the emergency department if you experience a worsening of symptoms or any new symptoms that are concerning to you.

## 2022-10-21 ENCOUNTER — OFFICE VISIT (OUTPATIENT)
Dept: INTERNAL MEDICINE CLINIC | Age: 52
End: 2022-10-21
Payer: MEDICAID

## 2022-10-21 ENCOUNTER — TELEPHONE (OUTPATIENT)
Dept: NON INVASIVE DIAGNOSTICS | Age: 52
End: 2022-10-21

## 2022-10-21 VITALS
TEMPERATURE: 98.1 F | SYSTOLIC BLOOD PRESSURE: 136 MMHG | BODY MASS INDEX: 31.94 KG/M2 | WEIGHT: 248.9 LBS | RESPIRATION RATE: 18 BRPM | DIASTOLIC BLOOD PRESSURE: 86 MMHG | HEART RATE: 74 BPM | OXYGEN SATURATION: 99 % | HEIGHT: 74 IN

## 2022-10-21 DIAGNOSIS — E11.9 TYPE 2 DIABETES MELLITUS WITHOUT COMPLICATION, WITHOUT LONG-TERM CURRENT USE OF INSULIN (HCC): ICD-10-CM

## 2022-10-21 DIAGNOSIS — L30.4 INTERTRIGO: ICD-10-CM

## 2022-10-21 DIAGNOSIS — I10 PRIMARY HYPERTENSION: ICD-10-CM

## 2022-10-21 DIAGNOSIS — E66.9 OBESITY (BMI 30.0-34.9): ICD-10-CM

## 2022-10-21 DIAGNOSIS — R07.9 CHEST PAIN, UNSPECIFIED TYPE: Primary | ICD-10-CM

## 2022-10-21 DIAGNOSIS — G43.009 MIGRAINE WITHOUT AURA AND WITHOUT STATUS MIGRAINOSUS, NOT INTRACTABLE: ICD-10-CM

## 2022-10-21 DIAGNOSIS — I51.89 GRADE I DIASTOLIC DYSFUNCTION: ICD-10-CM

## 2022-10-21 LAB
EST. AVERAGE GLUCOSE BLD GHB EST-MCNC: 143 MG/DL
HBA1C MFR BLD: 6.6 % (ref 4–5.6)

## 2022-10-21 PROCEDURE — 99215 OFFICE O/P EST HI 40 MIN: CPT | Performed by: INTERNAL MEDICINE

## 2022-10-21 PROCEDURE — 36415 COLL VENOUS BLD VENIPUNCTURE: CPT | Performed by: INTERNAL MEDICINE

## 2022-10-21 NOTE — PROGRESS NOTES
Destin Quach is a 46 y.o. male    Chief Complaint   Patient presents with    Diabetes    Hypertension     1. Have you been to the ER, urgent care clinic since your last visit? Hospitalized since your last visit? No    2. Have you seen or consulted any other health care providers outside of the [address] [place] [address] since your last visit? Include any pap smears or colon screening.  No

## 2022-10-21 NOTE — PROGRESS NOTES
SPORTS MEDICINE AND PRIMARY CARE  Segundo Vasquez MD, [address] [id number] [facility],[address] [address]  Phone:  [phone]  Fax: [phone]       Chief Complaint   Patient presents with    Diabetes    Hypertension   . SUBJECTIVE:    Jcarlos Rosenberg is a 46 y.o. male Patient returns today after visit with Charlotte Coe M.D. on 10/16/2022, complaining of chest pain, and underwent evaluation. He noted the chest pain was new and required a workup, hypertension was new and required a workup and he was subsequently released. His EKG was reassuring and his symptoms resolved. Other medical problems include obesity, intertrigo, type 2 diabetes, grade 1 diastolic dysfunction, primary hypertension, migraine and is seen for evaluation. The patient continues to have not really chest pain, he says, and puts his hand over his anterior chest and says it is a vague and uncomfortable feeling that lasted about a half hour yesterday. Other new complaints denied and patient is seen for evaluation. Current Outpatient Medications   Medication Sig Dispense Refill    clotrimazole-betamethasone (LOTRISONE) topical cream Apply  to affected area two (2) times a day. 45 g 11    meloxicam (MOBIC) 7.5 mg tablet TAKE 1 TABLET BY MOUTH DAILY 30 Tablet 3    losartan (COZAAR) 100 mg tablet Take 1 Tablet by mouth daily. 90 Tablet 3    atorvastatin (LIPITOR) 10 mg tablet Take 1 Tablet by mouth daily. 30 Tablet 11    dilTIAZem ER (CARDIZEM CD) 180 mg capsule Take 1 Capsule by mouth daily. 90 Capsule 3    metFORMIN ER (GLUCOPHAGE XR) 500 mg tablet TAKE 1 TABLET BY MOUTH DAILY WITH DINNER 30 Tablet 11    acetaminophen (TYLENOL) 500 mg tablet Take 2 Tabs by mouth every six (6) hours as needed for Pain or Fever. 30 Tab 0    Blood-Glucose Meter (ONETOUCH VERIO FLEX) misc Used to test blood sugars 3 times daily 1 Each 0    glucose blood VI test strips (ONETOUCH VERIO) strip Used to test blood sugar 3 times daily.  100 Strip 11     Past Medical History:   Diagnosis Date    Abnormal EKG 05/17/2019    Chest pain 10/16/2022    Diverticulosis 06/04/2019    [facility] 05/17/2019    Dyslipidemia     Grade I diastolic dysfunction 94/92/6748    echo    Headache 11/06/2020    Hemorrhoids 06/04/2019    HTN (hypertension)     Low back pain     Migraine without aura and without status migrainosus, not intractable 11/30/2020    Stevenson Casper DO - 12/4/20 - Normal MRI of the brain    Obesity (BMI 30.0-34.9)     Obesity (BMI 30.0-34. 9)     Preventative health care     S/P colonoscopy 06/28/2019    md viry -10 yrs -hemorrhoids,tics    Tinnitus of both ears      Past Surgical History:   Procedure Laterality Date    COLONOSCOPY N/A 6/28/2019    COLONOSCOPY performed by Romulo Zimmerman MD at [facility] ENDOSCOPY     No Known Allergies      REVIEW OF SYSTEMS:  General: negative for - chills or fever  ENT: negative for - headaches, nasal congestion or tinnitus  Respiratory: negative for - cough, hemoptysis, shortness of breath or wheezing  Cardiovascular : negative for - chest pain, edema, palpitations or shortness of breath  Gastrointestinal: negative for - abdominal pain, blood in stools, heartburn or nausea/vomiting  Genito-Urinary: no dysuria, trouble voiding, or hematuria  Musculoskeletal: negative for - gait disturbance, joint pain, joint stiffness or joint swelling  Neurological: no TIA or stroke symptoms  Hematologic: no bruises, no bleeding, no swollen glands  Integument: no lumps, mole changes, nail changes or rash  Endocrine: no malaise/lethargy or unexpected weight changes      Social History     Socioeconomic History    Marital status:    Tobacco Use    Smoking status: Never    Smokeless tobacco: Never   Vaping Use    Vaping Use: Never used   Substance and Sexual Activity    Alcohol use: Never    Drug use: Never    Sexual activity: Yes     Partners: Female     Birth control/protection: None   Social History Narrative Habits:  He is a lifetime nonsmoker, non drinker, non drug abuser. Social History:  The patient is , lives with his wife. He was born in [place]. He has been in the [address] [place] [address],[address] since 2005. They have three children, two daughters ages 9 and 11, and a son age 3. He completed his ALIA at [employer]. He was in [facility] [employer] [employer] for five years. 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 Family History:  Father 76, alive and well. Mother 67 with diabetes. Six brothers and four sisters are alive and well. Family History   Problem Relation Age of Onset    Diabetes Mother        OBJECTIVE:    Visit Vitals  /86 (BP 1 Location: Right arm, BP Patient Position: Sitting)   Pulse 74   Temp 98.1 °F (36.7 °C) (Oral)   Resp 18   Ht 6' 2\" (1.88 m)   Wt 248 lb 14.4 oz (112.9 kg)   SpO2 99%   BMI 31.96 kg/m²     CONSTITUTIONAL: well , well nourished, appears age appropriate  EYES: perrla, eom intact  ENMT:moist mucous membranes, pharynx clear  NECK: supple. Thyroid normal  RESPIRATORY: Chest: clear bilaterally   CARDIOVASCULAR: Heart: regular rate and rhythm  GASTROINTESTINAL: Abdomen: soft, bowel sounds active  HEMATOLOGIC: no pathological lymph nodes palpated  MUSCULOSKELETAL: Extremities: no edema, pulse 1+   INTEGUMENT: No unusual rashes or suspicious skin lesions noted. Nails appear normal.  NEUROLOGIC: non-focal exam   MENTAL STATUS: alert and oriented, appropriate affect           ASSESSMENT:  1. Chest pain, unspecified type    2. Primary hypertension    3. Migraine without aura and without status migrainosus, not intractable    4. Grade I diastolic dysfunction    5. Type 2 diabetes mellitus without complication, without long-term current use of insulin ([address]    6. Intertrigo    7. Obesity (BMI 30.0-34. 9)      Continuing chest pain.   I will ask for a stress test as soon as possible, as well as a cardiology referral.  We advised him that he needs to call to make the appointments, but asked him to call today and tell them it is urgent. Blood pressure control is at goal.      He had a headache. I am not sure was it true migraine. Did not know it was associated with coffee or not. We discussed coffee. Diastolic dysfunction with no evidence of cardiac decompensation. He checks his blood sugar every other day and states it is usually in the normal range. Intertrigo is no longer a problem. Obesity remains a concern, although he has lost 7 pounds since we last saw him. We congratulated him. He will be back to see me in about three months sooner if he has any problems. On his next visit he will bring me his COVID vaccine dates, as we reviewed care gaps with him. I have discussed the diagnosis with the patient and the intended plan as seen in the  Orders. The patient understands and agees with the plan. The patient has   received an after visit summary and questions were answered concerning  future plans  Patient labs and/or xrays were reviewed  Past records were reviewed. PLAN:  .  Orders Placed This Encounter    HEMOGLOBIN A1C Aruna Welch [facility] Cardiology [facility] EMPL         Follow-up and Dispositions    Return in about 3 months (around 1/21/2023). ATTENTION:   This medical record was transcribed using an electronic medical records system. Although proofread, it may and can contain electronic and spelling errors. Other human spelling and other errors may be present. Corrections may be executed at a later time. Please feel free to contact us for any clarifications as needed.

## 2022-10-21 NOTE — TELEPHONE ENCOUNTER
Attempted to call patient this pm to review instructions for stress testing scheduled for Monday including that he may not have caffeine for 12 hours prior to testing. Voice mail states \"mailbox is full\".

## 2022-10-24 ENCOUNTER — HOSPITAL ENCOUNTER (OUTPATIENT)
Dept: NUCLEAR MEDICINE | Age: 52
Discharge: HOME | End: 2022-10-24
Attending: INTERNAL MEDICINE
Payer: MEDICAID

## 2022-10-24 ENCOUNTER — HOSPITAL ENCOUNTER (OUTPATIENT)
Dept: NON INVASIVE DIAGNOSTICS | Age: 52
Discharge: HOME | End: 2022-10-24
Attending: INTERNAL MEDICINE
Payer: MEDICAID

## 2022-10-24 VITALS
HEIGHT: 74 IN | DIASTOLIC BLOOD PRESSURE: 90 MMHG | SYSTOLIC BLOOD PRESSURE: 146 MMHG | BODY MASS INDEX: 31.94 KG/M2 | WEIGHT: 248.9 LBS

## 2022-10-24 DIAGNOSIS — R07.9 CHEST PAIN, UNSPECIFIED TYPE: ICD-10-CM

## 2022-10-24 PROBLEM — Z92.89 HISTORY OF NUCLEAR STRESS TEST: Status: ACTIVE | Noted: 2022-10-24

## 2022-10-24 LAB
STRESS ANGINA INDEX: 0
STRESS BASELINE DIAS BP: 90 MMHG
STRESS BASELINE HR: 65 BPM
STRESS BASELINE SYS BP: 146 MMHG
STRESS ESTIMATED WORKLOAD: 10.1 METS
STRESS EXERCISE DUR MIN: 9 MIN
STRESS EXERCISE DUR SEC: 0 SEC
STRESS PEAK DIAS BP: 100 MMHG
STRESS PEAK SYS BP: 200 MMHG
STRESS PERCENT HR ACHIEVED: 87 %
STRESS POST PEAK HR: 146 BPM
STRESS RATE PRESSURE PRODUCT: NORMAL BPM*MMHG
STRESS TARGET HR: 168 BPM

## 2022-10-24 PROCEDURE — 78452 HT MUSCLE IMAGE SPECT MULT: CPT | Performed by: INTERNAL MEDICINE

## 2022-10-24 PROCEDURE — 93017 CV STRESS TEST TRACING ONLY: CPT

## 2022-10-24 PROCEDURE — A9500 TC99M SESTAMIBI: HCPCS

## 2022-10-24 RX ORDER — TETRAKIS(2-METHOXYISOBUTYLISOCYANIDE)COPPER(I) TETRAFLUOROBORATE 1 MG/ML
31.9 INJECTION, POWDER, LYOPHILIZED, FOR SOLUTION INTRAVENOUS
Status: COMPLETED | OUTPATIENT
Start: 2022-10-24 | End: 2022-10-24

## 2022-10-24 RX ORDER — TETRAKIS(2-METHOXYISOBUTYLISOCYANIDE)COPPER(I) TETRAFLUOROBORATE 1 MG/ML
11 INJECTION, POWDER, LYOPHILIZED, FOR SOLUTION INTRAVENOUS
Status: COMPLETED | OUTPATIENT
Start: 2022-10-24 | End: 2022-10-24

## 2022-10-24 RX ADMIN — TETRAKIS(2-METHOXYISOBUTYLISOCYANIDE)COPPER(I) TETRAFLUOROBORATE 31.9 MILLICURIE: 1 INJECTION, POWDER, LYOPHILIZED, FOR SOLUTION INTRAVENOUS at 10:12

## 2022-10-24 RX ADMIN — TETRAKIS(2-METHOXYISOBUTYLISOCYANIDE)COPPER(I) TETRAFLUOROBORATE 11 MILLICURIE: 1 INJECTION, POWDER, LYOPHILIZED, FOR SOLUTION INTRAVENOUS at 08:50

## 2022-11-17 ENCOUNTER — OFFICE VISIT (OUTPATIENT)
Dept: CARDIOLOGY CLINIC | Age: 52
End: 2022-11-17
Payer: MEDICAID

## 2022-11-17 VITALS
DIASTOLIC BLOOD PRESSURE: 70 MMHG | BODY MASS INDEX: 31.83 KG/M2 | OXYGEN SATURATION: 98 % | HEART RATE: 78 BPM | SYSTOLIC BLOOD PRESSURE: 120 MMHG | RESPIRATION RATE: 16 BRPM | WEIGHT: 248 LBS | HEIGHT: 74 IN

## 2022-11-17 DIAGNOSIS — E11.8 DM TYPE 2, CONTROLLED, WITH COMPLICATION (HCC): ICD-10-CM

## 2022-11-17 DIAGNOSIS — R07.89 NON-CARDIAC CHEST PAIN: Primary | ICD-10-CM

## 2022-11-17 DIAGNOSIS — I10 BENIGN ESSENTIAL HTN: ICD-10-CM

## 2022-11-17 DIAGNOSIS — E78.2 MIXED HYPERLIPIDEMIA: ICD-10-CM

## 2022-11-17 PROCEDURE — 3078F DIAST BP <80 MM HG: CPT | Performed by: INTERNAL MEDICINE

## 2022-11-17 PROCEDURE — 99204 OFFICE O/P NEW MOD 45 MIN: CPT | Performed by: INTERNAL MEDICINE

## 2022-11-17 PROCEDURE — 3074F SYST BP LT 130 MM HG: CPT | Performed by: INTERNAL MEDICINE

## 2022-11-17 NOTE — LETTER
Patient:  Anita Bahena   YOB: 1970  Date of Visit: 11/17/2022      Dear Maricel Petit MD  [address] [address] [place]: Thank you for referring Mr. Anita Bahena to me for evaluation/treatment. Below are the relevant portions of my assessment and plan of care. Mr. [address] JESUSITA is a 45 yo M with essential hypertension, mixed hyperlipidemia, type 2 diabetes, referred by Dr. Troy Mosley for cardiac evaluation. He is here due to chest pain. In particular, he went to the emergency room about a month ago for left sided chest pain. Workup there was overall unrevealing and they told him to follow up with his primary care physician. He did note he had been driving from [place] to [place] all night long, as well as carrying things from his [employer]. He did have a stress test on 10/24/2022 that was scheduled by Dr. Troy Mosley and this ended up being normal.  He went nine minutes on the standard Mark protocol and his nuclear stress imaging noted no ischemia and we discussed the results. Since his ER visit, he has not had any recurrent chest pain. His breathing has been okay. No significant palpitations, lightheadedness or dizziness. He is compensated on exam with clear lungs and no lower extremity edema. Soc hx. No tobacco  Fam hx. No early CAD  Assessment and Plan:   1. Noncardiac chest pain. His symptoms were atypical for cardiac etiology and his stress test was normal.  Likely it was musculoskeletal, as he had done some lifting and it has not recurred. At this point, no additional cardiac evaluation is indicated and he can follow here on an as needed basis. 2. Essential hypertension. Blood pressure is controlled. 3. Mixed hyperlipidemia. Tolerating statin. 4. Type 2 diabetes. If you have questions, please do not hesitate to call me.      Sincerely,      Freda Warren MD

## 2022-11-17 NOTE — PROGRESS NOTES
[facility] [place]: [facility]  [phone]    History of Present Illness:  Mr. Lucille Xiao is a 47 yo M with essential hypertension, mixed hyperlipidemia, type 2 diabetes, referred by Dr. Baltazar Hobbs for cardiac evaluation. He is here due to chest pain. In particular, he went to the emergency room about a month ago for left sided chest pain. Workup there was overall unrevealing and they told him to follow up with his primary care physician. He did note he had been driving from [place] to [place] all night long, as well as carrying things from his [employer]. He did have a stress test on 10/24/2022 that was scheduled by Dr. Baltazar Hobbs and this ended up being normal.  He went nine minutes on the standard Mark protocol and his nuclear stress imaging noted no ischemia and we discussed the results. Since his ER visit, he has not had any recurrent chest pain. His breathing has been okay. No significant palpitations, lightheadedness or dizziness. He is compensated on exam with clear lungs and no lower extremity edema. Soc hx. No tobacco  Fam hx. No early CAD  Assessment and Plan:   1. Noncardiac chest pain. His symptoms were atypical for cardiac etiology and his stress test was normal.  Likely it was musculoskeletal, as he had done some lifting and it has not recurred. At this point, no additional cardiac evaluation is indicated and he can follow here on an as needed basis. 2. Essential hypertension. Blood pressure is controlled. 3. Mixed hyperlipidemia. Tolerating statin. 4. Type 2 diabetes.         He  has a past medical history of Abnormal EKG (05/17/2019), Chest pain (10/16/2022), Diverticulosis (06/04/2019), DM2 (diabetes mellitus, type 2) ([facility] (05/17/2019), Dyslipidemia, Grade I diastolic dysfunction (82/78/5284), Headache (11/06/2020), Hemorrhoids (06/04/2019), History of nuclear stress test (10/24/2022), HTN (hypertension), Low back pain, Migraine without aura and without status migrainosus, not intractable (11/30/2020), Obesity (BMI 30.0-34.9), Obesity (BMI 30.0-34.9), Preventative health care, S/P colonoscopy (06/28/2019), and Tinnitus of both ears. All other systems negative except as above. PE  Vitals:    11/17/22 0814   BP: 120/70   Pulse: 78   Resp: 16   SpO2: 98%   Weight: 248 lb (112.5 kg)   Height: 6' 2\" (1.88 m)    Body mass index is 31.84 kg/m².   General appearance - alert, well appearing, and in no distress  Mental status - affect appropriate to mood  Eyes - sclera anicteric, moist mucous membranes  Neck - supple, no JVD  Chest - clear to auscultation, no wheezes, rales or rhonchi  Heart - normal rate, regular rhythm, normal S1, S2, no murmurs, rubs, clicks or gallops  Abdomen - soft, nontender, nondistended, no masses or organomegaly  Back exam - full range of motion, no tenderness  Neurological - no focal deficits  Extremities - peripheral pulses normal, no pedal edema      Recent Labs:  Lab Results   Component Value Date/Time    Cholesterol, total 141 11/24/2021 10:33 AM    HDL Cholesterol 57 11/24/2021 10:33 AM    LDL, calculated 51 11/24/2021 10:33 AM    Triglyceride 165 (H) 11/24/2021 10:33 AM    CHOL/HDL Ratio 2.5 11/24/2021 10:33 AM     Lab Results   Component Value Date/Time    Creatinine 1.04 10/15/2022 07:41 PM     Lab Results   Component Value Date/Time    BUN 14 10/15/2022 07:41 PM     Lab Results   Component Value Date/Time    Potassium 3.8 10/15/2022 07:41 PM     Lab Results   Component Value Date/Time    Hemoglobin A1c 6.6 (H) 10/21/2022 12:30 PM     Lab Results   Component Value Date/Time    HGB 15.5 10/15/2022 07:41 PM     Lab Results   Component Value Date/Time    PLATELET 199 06/97/2804 07:41 PM       Reviewed:  Past Medical History:   Diagnosis Date    Abnormal EKG 05/17/2019    Chest pain 10/16/2022    Diverticulosis 06/04/2019    DM2 (diabetes mellitus, type 2) ([facility] 05/17/2019    Dyslipidemia     Grade I diastolic dysfunction 24/89/8128    echo    Headache 11/06/2020    Hemorrhoids 06/04/2019    History of nuclear stress test 10/24/2022    Stress Combined Conclusion: Normal treadmill myocardial perfusion study at 87 %% PHR. Findings suggest a low risk of myocardial ischemia    HTN (hypertension)     Low back pain     Migraine without aura and without status migrainosus, not intractable 11/30/2020    Samina Padilla DO - 12/4/20 - Normal MRI of the brain    Obesity (BMI 30.0-34.9)     Obesity (BMI 30.0-34. 9)     Preventative health care     S/P colonoscopy 06/28/2019    md viry -10 yrs -hemorrhoids,tics    Tinnitus of both ears      Social History     Tobacco Use   Smoking Status Never   Smokeless Tobacco Never     Social History     Substance and Sexual Activity   Alcohol Use Never     No Known Allergies    Current Outpatient Medications   Medication Sig    clotrimazole-betamethasone (LOTRISONE) topical cream Apply  to affected area two (2) times a day. meloxicam (MOBIC) 7.5 mg tablet TAKE 1 TABLET BY MOUTH DAILY    losartan (COZAAR) 100 mg tablet Take 1 Tablet by mouth daily. atorvastatin (LIPITOR) 10 mg tablet Take 1 Tablet by mouth daily. dilTIAZem ER (CARDIZEM CD) 180 mg capsule Take 1 Capsule by mouth daily. metFORMIN ER (GLUCOPHAGE XR) 500 mg tablet TAKE 1 TABLET BY MOUTH DAILY WITH DINNER    acetaminophen (TYLENOL) 500 mg tablet Take 2 Tabs by mouth every six (6) hours as needed for Pain or Fever. Blood-Glucose Meter (ONETOUCH VERIO FLEX) misc Used to test blood sugars 3 times daily    glucose blood VI test strips (ONETOUCH VERIO) strip Used to test blood sugar 3 times daily. No current facility-administered medications for this visit.        Kristie Lee MD  [facility] and [facility] [place]  [address] [facility]  [address]

## 2022-12-12 ENCOUNTER — OFFICE VISIT (OUTPATIENT)
Dept: INTERNAL MEDICINE CLINIC | Age: 52
End: 2022-12-12
Payer: MEDICAID

## 2022-12-12 VITALS
BODY MASS INDEX: 32.76 KG/M2 | HEIGHT: 74 IN | HEART RATE: 75 BPM | TEMPERATURE: 98.3 F | RESPIRATION RATE: 18 BRPM | DIASTOLIC BLOOD PRESSURE: 88 MMHG | OXYGEN SATURATION: 97 % | WEIGHT: 255.3 LBS | SYSTOLIC BLOOD PRESSURE: 138 MMHG

## 2022-12-12 DIAGNOSIS — I51.89 GRADE I DIASTOLIC DYSFUNCTION: ICD-10-CM

## 2022-12-12 DIAGNOSIS — E78.5 DYSLIPIDEMIA: ICD-10-CM

## 2022-12-12 DIAGNOSIS — I10 PRIMARY HYPERTENSION: Primary | ICD-10-CM

## 2022-12-12 DIAGNOSIS — E11.9 TYPE 2 DIABETES MELLITUS WITHOUT COMPLICATION, WITHOUT LONG-TERM CURRENT USE OF INSULIN (HCC): ICD-10-CM

## 2022-12-12 PROCEDURE — 99213 OFFICE O/P EST LOW 20 MIN: CPT | Performed by: INTERNAL MEDICINE

## 2022-12-12 PROCEDURE — 3078F DIAST BP <80 MM HG: CPT | Performed by: INTERNAL MEDICINE

## 2022-12-12 PROCEDURE — 3074F SYST BP LT 130 MM HG: CPT | Performed by: INTERNAL MEDICINE

## 2022-12-12 PROCEDURE — 3044F HG A1C LEVEL LT 7.0%: CPT | Performed by: INTERNAL MEDICINE

## 2022-12-12 RX ORDER — INDOMETHACIN 50 MG/1
CAPSULE ORAL
COMMUNITY
Start: 2022-12-08 | End: 2022-12-12 | Stop reason: SDUPTHER

## 2022-12-12 RX ORDER — INDOMETHACIN 50 MG/1
50 CAPSULE ORAL 3 TIMES DAILY
Qty: 30 CAPSULE | Refills: 5 | Status: SHIPPED | OUTPATIENT
Start: 2022-12-12

## 2022-12-12 NOTE — PROGRESS NOTES
Angeles Balderas is a 46 y.o. male  Chief Complaint   Patient presents with    Follow-up     Patient here for follow up from [facility] for Gout flare left foot. 1. Have you been to the ER, urgent care clinic since your last visit? Hospitalized since your last visit? No    2. Have you seen or consulted any other health care providers outside of the [address] [place] [address] since your last visit? Include any pap smears or colon screening.  Yes When: 12/08/2022 Where: [facility] Reason for visit: Gout

## 2022-12-12 NOTE — PROGRESS NOTES
SPORTS MEDICINE AND PRIMARY CARE  Adriel Michaels MD, [place] [address] [address] [facility],[address] [address]  Phone:  [phone]  Fax: [phone]      Chief Complaint   Patient presents with    Follow-up     Patient here for follow up from [facility] for Gout flare left foot. SUBECTIVE:    Sharon Howard is a 46 y.o. male Since we last saw him, he was seen by Eulalio Leonard MD for cardiac evaluation of chest pain. Dr. Antonio Mcgowan felt he had noncardiac chest pain, symptoms were not typical for cardiac etiology, and stress test was normal.  He felt it was likely musculoskeletal as he had done some lifting and it had not recurred. No additional cardiac evaluation was recommended. Blood pressure was noted to be controlled. He was tolerating his statin. He also has type 2 diabetes. Seen at [facility] for a painful great toe and felt to have a gout attack on the left, started on Indocin, and is doing much better. Has gone from a 10 to a 1 now, he states. Patient is seen for evaluation. Current Outpatient Medications   Medication Sig Dispense Refill    indomethacin (INDOCIN) 50 mg capsule Take 1 Capsule by mouth three (3) times daily. For  days prn gout attack 30 Capsule 5    clotrimazole-betamethasone (LOTRISONE) topical cream Apply  to affected area two (2) times a day. 45 g 11    losartan (COZAAR) 100 mg tablet Take 1 Tablet by mouth daily. 90 Tablet 3    atorvastatin (LIPITOR) 10 mg tablet Take 1 Tablet by mouth daily. 30 Tablet 11    dilTIAZem ER (CARDIZEM CD) 180 mg capsule Take 1 Capsule by mouth daily. 90 Capsule 3    metFORMIN ER (GLUCOPHAGE XR) 500 mg tablet TAKE 1 TABLET BY MOUTH DAILY WITH DINNER 30 Tablet 11    acetaminophen (TYLENOL) 500 mg tablet Take 2 Tabs by mouth every six (6) hours as needed for Pain or Fever.  30 Tab 0    Blood-Glucose Meter (ONETOUCH VERIO FLEX) misc Used to test blood sugars 3 times daily 1 Each 0    glucose blood VI test strips (ONETOUCH VERIO) strip Used to test blood sugar 3 times daily. 100 Strip 11     Past Medical History:   Diagnosis Date    Abnormal EKG 05/17/2019    Chest pain 10/16/2022    Diverticulosis 06/04/2019    DM2 (diabetes mellitus, type 2) ([facility] 05/17/2019    Dyslipidemia     Grade I diastolic dysfunction 57/15/7325    echo    Headache 11/06/2020    Hemorrhoids 06/04/2019    History of nuclear stress test 10/24/2022    Stress Combined Conclusion: Normal treadmill myocardial perfusion study at 87 %% PHR. Findings suggest a low risk of myocardial ischemia    HTN (hypertension)     Low back pain     Migraine without aura and without status migrainosus, not intractable 11/30/2020    Uma Gray DO - 12/4/20 - Normal MRI of the brain    Obesity (BMI 30.0-34.9)     Obesity (BMI 30.0-34. 9)     Preventative health care     S/P colonoscopy 06/28/2019    md viry -10 yrs -hemorrhoids,tics    Tinnitus of both ears      Past Surgical History:   Procedure Laterality Date    COLONOSCOPY N/A 6/28/2019    COLONOSCOPY performed by Christiano Gilbert MD at hospitals ENDOSCOPY     No Known Allergies    REVIEW OF SYSTEMS:   No chest pain, no shortness of breath. Social History     Socioeconomic History    Marital status:    Tobacco Use    Smoking status: Never    Smokeless tobacco: Never   Vaping Use    Vaping Use: Never used   Substance and Sexual Activity    Alcohol use: Never    Drug use: Never    Sexual activity: Yes     Partners: Female     Birth control/protection: None   Social History Narrative    Habits:  He is a lifetime nonsmoker, non drinker, non drug abuser. Social History:  The patient is , lives with his wife. He was born in [place]. He has been in the [address] [place] [address],[address] since 2005. They have three children, two daughters ages 9 and 11, and a son age 3. He completed his ALIA at [employer]. He was in [employer] [facility] for five years. 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 Latter-day background is Shinto.          Family History:  Father 76, alive and well. Mother 67 with diabetes. Six brothers and four sisters are alive and well.   r  Family History   Problem Relation Age of Onset    Diabetes Mother        OBJECTIVE:  Visit Vitals  /88   Pulse 75   Temp 98.3 °F (36.8 °C) (Oral)   Resp 18   Ht 6' 2\" (1.88 m)   Wt 255 lb 4.8 oz (115.8 kg)   SpO2 97%   BMI 32.78 kg/m²     ENT: perrla,  eom intact  NECK: supple. Thyroid normal  CHEST: clear to ascultation and percussion   HEART: regular rate and rhythm  ABD: soft, bowel sounds active  EXTREMITIES: no edema, pulse 1+     Hospital Outpatient Visit on 10/24/2022   Component Date Value Ref Range Status    Stress Target HR 10/24/2022 168  bpm Final    Exercise Duration Time 10/24/2022 9  min Final    Exercuse Duration Seconds 10/24/2022 0  sec Final    Stress Systolic BP 48/01/8255 206  mmHg Final    Stress Diastolic BP 27/94/7581 486  mmHg Final    Stress Peak HR 10/24/2022 146  BPM Final    Baseline HR 10/24/2022 65  BPM Final    Stress Estimated Workload 10/24/2022 10.1  METS Final    Stress Rate Pressure Product 10/24/2022 29,200  BPM*mmHg Final    Stress Percent HR Achieved 10/24/2022 87  % Final    Baseline Systolic BP 82/67/1999 913  mmHg Final    Baseline Diastolic BP 05/37/9839 90  mmHg Final    Angina Index 10/24/2022 0   Final   Office Visit on 10/21/2022   Component Date Value Ref Range Status    Hemoglobin A1c 10/21/2022 6.6 (A)  4.0 - 5.6 % Final    Comment: NEW METHOD  PLEASE NOTE NEW REFERENCE RANGE  (NOTE)  HbA1C Interpretive Ranges  <5.7              Normal  5.7 - 6.4         Consider Prediabetes  >6.5              Consider Diabetes      Est. average glucose 10/21/2022 143  mg/dL Final          ASSESSMENT:  1. Primary hypertension    2. Type 2 diabetes mellitus without complication, without long-term current use of insulin ([address]    3. Dyslipidemia    4.  Grade I diastolic dysfunction      Blood pressure control is at goal.    We will check hemoglobin A1c for control of his diabetes on the next visit. He is on Atorvastatin for his cholesterol. He has grade 1 diastolic dysfunction with no symptoms. He had an acute gout attack, for which he will take ________________ (clipped audio) on a prn basis. The last gout attack he had was in 2019. The frequency is not great enough to place him on Allopurinol. He will be back to see me in three months. I have discussed the diagnosis with the patient and the intended plan as seen in the  orders above. The patient understands and agees with the plan. The patient has   received an after visit summary and questions were answered concerning  future plans  Patient labs and/or xrays were reviewed  Past records were reviewed. PLAN:  .  Orders Placed This Encounter    DISCONTD: indomethacin (INDOCIN) 50 mg capsule    indomethacin (INDOCIN) 50 mg capsule       Follow-up and Dispositions    Return in about 3 months (around 3/12/2023). ATTENTION:   This medical record was transcribed using an electronic medical records system. Although proofread, it may and can contain electronic and spelling errors. Other human spelling and other errors may be present. Corrections may be executed at a later time. Please feel free to contact us for any clarifications as needed.

## 2022-12-20 ENCOUNTER — OFFICE VISIT (OUTPATIENT)
Dept: INTERNAL MEDICINE CLINIC | Age: 52
End: 2022-12-20
Payer: MEDICAID

## 2022-12-20 VITALS
BODY MASS INDEX: 32.85 KG/M2 | TEMPERATURE: 98.6 F | HEIGHT: 74 IN | DIASTOLIC BLOOD PRESSURE: 89 MMHG | RESPIRATION RATE: 16 BRPM | HEART RATE: 72 BPM | WEIGHT: 256 LBS | OXYGEN SATURATION: 95 % | SYSTOLIC BLOOD PRESSURE: 142 MMHG

## 2022-12-20 DIAGNOSIS — E78.5 DYSLIPIDEMIA: ICD-10-CM

## 2022-12-20 DIAGNOSIS — I10 PRIMARY HYPERTENSION: ICD-10-CM

## 2022-12-20 DIAGNOSIS — E66.9 OBESITY (BMI 30.0-34.9): ICD-10-CM

## 2022-12-20 DIAGNOSIS — E11.9 TYPE 2 DIABETES MELLITUS WITHOUT COMPLICATION, WITHOUT LONG-TERM CURRENT USE OF INSULIN (HCC): Primary | ICD-10-CM

## 2022-12-20 DIAGNOSIS — G89.29 TOE PAIN, CHRONIC, LEFT: ICD-10-CM

## 2022-12-20 DIAGNOSIS — M79.675 TOE PAIN, CHRONIC, LEFT: ICD-10-CM

## 2022-12-20 DIAGNOSIS — H91.93 BILATERAL HEARING LOSS, UNSPECIFIED HEARING LOSS TYPE: ICD-10-CM

## 2022-12-20 PROCEDURE — 99213 OFFICE O/P EST LOW 20 MIN: CPT | Performed by: INTERNAL MEDICINE

## 2022-12-20 PROCEDURE — 3078F DIAST BP <80 MM HG: CPT | Performed by: INTERNAL MEDICINE

## 2022-12-20 PROCEDURE — 3044F HG A1C LEVEL LT 7.0%: CPT | Performed by: INTERNAL MEDICINE

## 2022-12-20 PROCEDURE — 3074F SYST BP LT 130 MM HG: CPT | Performed by: INTERNAL MEDICINE

## 2022-12-20 NOTE — PROGRESS NOTES
Chief Complaint   Patient presents with    Toe Swelling     Visit Vitals  BP (!) 142/89   Pulse 72   Temp 98.6 °F (37 °C)   Resp 16   Ht 6' 2\" (1.88 m)   Wt 256 lb (116.1 kg)   SpO2 95%   BMI 32.87 kg/m²     1. Have you been to the ER, urgent care clinic since your last visit? Hospitalized since your last visit? No    2. Have you seen or consulted any other health care providers outside of the [address] [place] [address] since your last visit? Include any pap smears or colon screening.  No

## 2022-12-20 NOTE — PROGRESS NOTES
SPORTS MEDICINE AND PRIMARY CARE  Ifrah Simon MD, [address] [address]  Phone:  [phone]  Fax: [phone]       Chief Complaint   Patient presents with    Toe Swelling   . SUBJECTIVE:    Walter Purvis is a 46 y.o. male Patient returns today complaining of toe swelling. There is a questionable history of gout. He had a uric acid, however, of 6.8 in 2019. Another uric acid is not available to us at this time. Other medical problems include type 2 diabetes, primary hypertension, hearing deficit, dyslipidemia, obesity, and he is seen for evaluation. On 04/30/19 he had a similar episode to this same toe. Xrays were taken, which was negative, and evaluation was completely unremarkable at that time and they could not find a reason for the toe pain. He started the prednisone two days ago and is seen for evaluation. Current Outpatient Medications   Medication Sig Dispense Refill    indomethacin (INDOCIN) 50 mg capsule Take 1 Capsule by mouth three (3) times daily. For  days prn gout attack 30 Capsule 5    clotrimazole-betamethasone (LOTRISONE) topical cream Apply  to affected area two (2) times a day. 45 g 11    losartan (COZAAR) 100 mg tablet Take 1 Tablet by mouth daily. 90 Tablet 3    atorvastatin (LIPITOR) 10 mg tablet Take 1 Tablet by mouth daily. 30 Tablet 11    dilTIAZem ER (CARDIZEM CD) 180 mg capsule Take 1 Capsule by mouth daily. 90 Capsule 3    metFORMIN ER (GLUCOPHAGE XR) 500 mg tablet TAKE 1 TABLET BY MOUTH DAILY WITH DINNER 30 Tablet 11    acetaminophen (TYLENOL) 500 mg tablet Take 2 Tabs by mouth every six (6) hours as needed for Pain or Fever. 30 Tab 0    Blood-Glucose Meter (ONETOUCH VERIO FLEX) misc Used to test blood sugars 3 times daily 1 Each 0    glucose blood VI test strips (ONETOUCH VERIO) strip Used to test blood sugar 3 times daily.  100 Strip 11     Past Medical History:   Diagnosis Date    Abnormal EKG 05/17/2019    Chest pain 10/16/2022    Diverticulosis 06/04/2019    [facility] type [facility]) [facility] 05/17/2019    Dyslipidemia     Grade I diastolic dysfunction 25/78/2430    echo    Headache 11/06/2020    Hemorrhoids 06/04/2019    History of nuclear stress test 10/24/2022    Stress Combined Conclusion: Normal treadmill myocardial perfusion study at 87 %% PHR. Findings suggest a low risk of myocardial ischemia    HTN (hypertension)     Low back pain     Migraine without aura and without status migrainosus, not intractable 11/30/2020    Gil Axon, DO - 12/4/20 - Normal MRI of the brain    Obesity (BMI 30.0-34.9)     Obesity (BMI 30.0-34. 9)     Preventative health care     S/P colonoscopy 06/28/2019    md viry -10 yrs -hemorrhoids,tics    Tinnitus of both ears     Toe pain, chronic, left 12/20/2022     Past Surgical History:   Procedure Laterality Date    COLONOSCOPY N/A 6/28/2019    COLONOSCOPY performed by Bobo Corbin MD at [facility] ENDOSCOPY     No Known Allergies      REVIEW OF SYSTEMS:  General: negative for - chills or fever  ENT: negative for - headaches, nasal congestion or tinnitus  Respiratory: negative for - cough, hemoptysis, shortness of breath or wheezing  Cardiovascular : negative for - chest pain, edema, palpitations or shortness of breath  Gastrointestinal: negative for - abdominal pain, blood in stools, heartburn or nausea/vomiting  Genito-Urinary: no dysuria, trouble voiding, or hematuria  Musculoskeletal: negative for - gait disturbance, joint pain, joint stiffness or joint swelling  Neurological: no TIA or stroke symptoms  Hematologic: no bruises, no bleeding, no swollen glands  Integument: no lumps, mole changes, nail changes or rash  Endocrine: no malaise/lethargy or unexpected weight changes      Social History     Socioeconomic History    Marital status:    Tobacco Use    Smoking status: Never    Smokeless tobacco: Never   Vaping Use    Vaping Use: Never used   Substance and Sexual Activity Alcohol use: Never    Drug use: Never    Sexual activity: Yes     Partners: Female     Birth control/protection: None   Social History Narrative    Habits:  He is a lifetime nonsmoker, non drinker, non drug abuser. Social History:  The patient is , lives with his wife. He was born in [place]. He has been in the [address] [place] [address],[address] since 2005. They have three children, two daughters ages 9 and 11, and a son age 3. He completed his ALIA at [employer]. He was in [facility] for five years. 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 Jewish background is Rastafarian. Family History:  Father 76, alive and well. Mother 67 with diabetes. Six brothers and four sisters are alive and well. Family History   Problem Relation Age of Onset    Diabetes Mother        OBJECTIVE:    Visit Vitals  BP (!) 142/89   Pulse 72   Temp 98.6 °F (37 °C)   Resp 16   Ht 6' 2\" (1.88 m)   Wt 256 lb (116.1 kg)   SpO2 95%   BMI 32.87 kg/m²     CONSTITUTIONAL: well , well nourished, appears age appropriate  EYES: perrla, eom intact  ENMT:moist mucous membranes, pharynx clear  NECK: supple. Thyroid normal  RESPIRATORY: Chest: clear bilaterally   CARDIOVASCULAR: Heart: regular rate and rhythm  GASTROINTESTINAL: Abdomen: soft, bowel sounds active  HEMATOLOGIC: no pathological lymph nodes palpated  MUSCULOSKELETAL: Extremities: no edema, pulse 1+ Physical exam reveals tenderness of the MT joint of the left great toe. No edema. Fine filament sensation is intact. Vibratory sense is absent. Pulses are intact. INTEGUMENT: No unusual rashes or suspicious skin lesions noted. Nails appear normal.  NEUROLOGIC: non-focal exam   MENTAL STATUS: alert and oriented, appropriate affect           ASSESSMENT:  1. Type 2 diabetes mellitus without complication, without long-term current use of insulin ([address]    2. Primary hypertension    3. Bilateral hearing loss, unspecified hearing loss type    4. Dyslipidemia    5.  Obesity (BMI 30.0-34.9)    6. Toe pain, chronic, left      Hemoglobin A1c was done on last visit and blood sugar control was good with hemoglobin A1c at 6.6. No titration is needed. Blood pressure control is at the upper limits of normal.  He is having pain, however. Hearing deficit is unchanged. Dyslipidemia is controlled with Atorvastatin. Little change in his weight. The great toe pain is not likely to be gout, although it could be chronic episode of gout and could also be pseudogout. We will refer to podiatry for another opinion. I would like him to finish the prednisone, however. I suspect that completion of the prednisone should resolve the discomfort, in which case he could cancel the appointment with the podiatrist.  If it does not get better, he will contact us with Sabiha. Care gaps are reviewed. He will be back to see me in three months. I have discussed the diagnosis with the patient and the intended plan as seen in the  Orders. The patient understands and agees with the plan. The patient has   received an after visit summary and questions were answered concerning  future plans  Patient labs and/or xrays were reviewed  Past records were reviewed. PLAN:  .  Orders Placed This Encounter    URIC ACID    MICROALBUMIN, UR, RAND W/ MICROALB/CREAT RATIO    LIPID PANEL    REFERRAL TO PODIATRY       Follow-up and Dispositions    Return in about 3 months (around 3/20/2023). ATTENTION:   This medical record was transcribed using an electronic medical records system. Although proofread, it may and can contain electronic and spelling errors. Other human spelling and other errors may be present. Corrections may be executed at a later time. Please feel free to contact us for any clarifications as needed.

## 2022-12-21 LAB
CHOLEST SERPL-MCNC: 126 MG/DL
COMMENT, HOLDF: NORMAL
CREAT UR-MCNC: 295 MG/DL
HDLC SERPL-MCNC: 56 MG/DL
HDLC SERPL: 2.3 {RATIO} (ref 0–5)
LDLC SERPL CALC-MCNC: 25.8 MG/DL (ref 0–100)
MICROALBUMIN UR-MCNC: 0.76 MG/DL
MICROALBUMIN/CREAT UR-RTO: 3 MG/G (ref 0–30)
SAMPLES BEING HELD,HOLD: NORMAL
TRIGL SERPL-MCNC: 221 MG/DL (ref ?–150)
URATE SERPL-MCNC: 5.9 MG/DL (ref 3.5–7.2)
VLDLC SERPL CALC-MCNC: 44.2 MG/DL

## 2023-04-29 RX ORDER — ATORVASTATIN CALCIUM 10 MG/1
10 TABLET, FILM COATED ORAL DAILY
Qty: 30 TABLET | Refills: 11 | Status: SHIPPED | OUTPATIENT
Start: 2023-04-29 | End: 2023-04-29

## 2023-04-29 RX ORDER — DILTIAZEM HYDROCHLORIDE 180 MG/1
180 CAPSULE, COATED, EXTENDED RELEASE ORAL DAILY
Qty: 90 CAPSULE | Refills: 3 | Status: SHIPPED | OUTPATIENT
Start: 2023-04-29

## 2023-04-29 RX ORDER — ATORVASTATIN CALCIUM 10 MG/1
10 TABLET, FILM COATED ORAL DAILY
Qty: 30 TABLET | Refills: 11 | Status: SHIPPED | OUTPATIENT
Start: 2023-04-29

## 2023-04-29 RX ORDER — LOSARTAN POTASSIUM 100 MG/1
100 TABLET ORAL DAILY
Qty: 90 TABLET | Refills: 3 | Status: SHIPPED | OUTPATIENT
Start: 2023-04-29

## 2023-05-29 RX ORDER — METFORMIN HYDROCHLORIDE 500 MG/1
TABLET, EXTENDED RELEASE ORAL
Qty: 30 TABLET | Refills: 5 | Status: SHIPPED | OUTPATIENT
Start: 2023-05-29 | End: 2023-07-04 | Stop reason: DRUGHIGH

## 2023-07-03 ENCOUNTER — OFFICE VISIT (OUTPATIENT)
Facility: CLINIC | Age: 53
End: 2023-07-03
Payer: MEDICAID

## 2023-07-03 VITALS
HEART RATE: 97 BPM | BODY MASS INDEX: 32.21 KG/M2 | WEIGHT: 251 LBS | DIASTOLIC BLOOD PRESSURE: 84 MMHG | RESPIRATION RATE: 18 BRPM | OXYGEN SATURATION: 98 % | SYSTOLIC BLOOD PRESSURE: 124 MMHG | HEIGHT: 74 IN | TEMPERATURE: 97.9 F

## 2023-07-03 DIAGNOSIS — E78.5 DYSLIPIDEMIA: ICD-10-CM

## 2023-07-03 DIAGNOSIS — I51.89 GRADE I DIASTOLIC DYSFUNCTION: ICD-10-CM

## 2023-07-03 DIAGNOSIS — I10 PRIMARY HYPERTENSION: ICD-10-CM

## 2023-07-03 DIAGNOSIS — E11.9 TYPE 2 DIABETES MELLITUS WITHOUT COMPLICATION, WITHOUT LONG-TERM CURRENT USE OF INSULIN (HCC): Primary | ICD-10-CM

## 2023-07-03 DIAGNOSIS — E66.9 OBESITY (BMI 30.0-34.9): ICD-10-CM

## 2023-07-03 PROCEDURE — 3079F DIAST BP 80-89 MM HG: CPT | Performed by: INTERNAL MEDICINE

## 2023-07-03 PROCEDURE — 99214 OFFICE O/P EST MOD 30 MIN: CPT | Performed by: INTERNAL MEDICINE

## 2023-07-03 PROCEDURE — 36415 COLL VENOUS BLD VENIPUNCTURE: CPT | Performed by: INTERNAL MEDICINE

## 2023-07-03 PROCEDURE — 3074F SYST BP LT 130 MM HG: CPT | Performed by: INTERNAL MEDICINE

## 2023-07-03 ASSESSMENT — PATIENT HEALTH QUESTIONNAIRE - PHQ9
SUM OF ALL RESPONSES TO PHQ QUESTIONS 1-9: 0
2. FEELING DOWN, DEPRESSED OR HOPELESS: 0
SUM OF ALL RESPONSES TO PHQ QUESTIONS 1-9: 0
SUM OF ALL RESPONSES TO PHQ9 QUESTIONS 1 & 2: 0
SUM OF ALL RESPONSES TO PHQ QUESTIONS 1-9: 0
1. LITTLE INTEREST OR PLEASURE IN DOING THINGS: 0
SUM OF ALL RESPONSES TO PHQ QUESTIONS 1-9: 0

## 2023-07-03 ASSESSMENT — ANXIETY QUESTIONNAIRES
6. BECOMING EASILY ANNOYED OR IRRITABLE: 0
2. NOT BEING ABLE TO STOP OR CONTROL WORRYING: 0
7. FEELING AFRAID AS IF SOMETHING AWFUL MIGHT HAPPEN: 0
GAD7 TOTAL SCORE: 0
3. WORRYING TOO MUCH ABOUT DIFFERENT THINGS: 0
1. FEELING NERVOUS, ANXIOUS, OR ON EDGE: 0
IF YOU CHECKED OFF ANY PROBLEMS ON THIS QUESTIONNAIRE, HOW DIFFICULT HAVE THESE PROBLEMS MADE IT FOR YOU TO DO YOUR WORK, TAKE CARE OF THINGS AT HOME, OR GET ALONG WITH OTHER PEOPLE: NOT DIFFICULT AT ALL
5. BEING SO RESTLESS THAT IT IS HARD TO SIT STILL: 0
4. TROUBLE RELAXING: 0

## 2023-07-04 LAB
EST. AVERAGE GLUCOSE BLD GHB EST-MCNC: 171 MG/DL
HBA1C MFR BLD: 7.6 % (ref 4–5.6)
HIV 1+2 AB+HIV1 P24 AG SERPL QL IA: NONREACTIVE
HIV 1/2 RESULT COMMENT: NORMAL

## 2023-07-04 RX ORDER — METFORMIN HYDROCHLORIDE 500 MG/1
1000 TABLET, EXTENDED RELEASE ORAL
Qty: 180 TABLET | Refills: 3 | Status: SHIPPED | OUTPATIENT
Start: 2023-07-04

## 2023-07-17 RX ORDER — LOSARTAN POTASSIUM 100 MG/1
100 TABLET ORAL DAILY
Qty: 30 TABLET | Refills: 11 | Status: SHIPPED | OUTPATIENT
Start: 2023-07-17

## 2023-07-17 RX ORDER — METFORMIN HYDROCHLORIDE 500 MG/1
1000 TABLET, EXTENDED RELEASE ORAL
Qty: 180 TABLET | Refills: 3 | Status: SHIPPED | OUTPATIENT
Start: 2023-07-17

## 2023-07-17 RX ORDER — DILTIAZEM HYDROCHLORIDE 180 MG/1
180 CAPSULE, EXTENDED RELEASE ORAL DAILY
Qty: 30 CAPSULE | Refills: 11 | Status: SHIPPED | OUTPATIENT
Start: 2023-07-17

## 2023-07-17 RX ORDER — ATORVASTATIN CALCIUM 10 MG/1
10 TABLET, FILM COATED ORAL DAILY
Qty: 30 TABLET | Refills: 11 | Status: SHIPPED | OUTPATIENT
Start: 2023-07-17

## 2023-12-04 RX ORDER — DILTIAZEM HYDROCHLORIDE 180 MG/1
180 CAPSULE, EXTENDED RELEASE ORAL DAILY
Qty: 30 CAPSULE | Refills: 11 | Status: SHIPPED | OUTPATIENT
Start: 2023-12-04

## 2023-12-04 RX ORDER — ATORVASTATIN CALCIUM 10 MG/1
10 TABLET, FILM COATED ORAL DAILY
Qty: 30 TABLET | Refills: 11 | Status: SHIPPED | OUTPATIENT
Start: 2023-12-04

## 2023-12-04 RX ORDER — LOSARTAN POTASSIUM 100 MG/1
100 TABLET ORAL DAILY
Qty: 30 TABLET | Refills: 11 | Status: SHIPPED | OUTPATIENT
Start: 2023-12-04

## 2023-12-04 RX ORDER — METFORMIN HYDROCHLORIDE 500 MG/1
1000 TABLET, EXTENDED RELEASE ORAL
Qty: 180 TABLET | Refills: 3 | Status: SHIPPED | OUTPATIENT
Start: 2023-12-04

## 2024-02-03 ENCOUNTER — APPOINTMENT (OUTPATIENT)
Facility: HOSPITAL | Age: 54
End: 2024-02-03
Payer: MEDICAID

## 2024-02-03 ENCOUNTER — HOSPITAL ENCOUNTER (EMERGENCY)
Facility: HOSPITAL | Age: 54
Discharge: HOME | End: 2024-02-04
Attending: STUDENT IN AN ORGANIZED HEALTH CARE EDUCATION/TRAINING PROGRAM
Payer: MEDICAID

## 2024-02-03 VITALS
DIASTOLIC BLOOD PRESSURE: 96 MMHG | SYSTOLIC BLOOD PRESSURE: 133 MMHG | TEMPERATURE: 98.4 F | RESPIRATION RATE: 18 BRPM | HEART RATE: 87 BPM | OXYGEN SATURATION: 97 %

## 2024-02-03 DIAGNOSIS — R10.13 ABDOMINAL PAIN, EPIGASTRIC: Primary | ICD-10-CM

## 2024-02-03 DIAGNOSIS — E11.65 TYPE 2 DIABETES MELLITUS WITH HYPERGLYCEMIA, WITHOUT LONG-TERM CURRENT USE OF INSULIN (HCC): ICD-10-CM

## 2024-02-03 DIAGNOSIS — R14.0 BLOATING: ICD-10-CM

## 2024-02-03 LAB
ALBUMIN SERPL-MCNC: 3.9 G/DL (ref 3.5–5)
ALBUMIN/GLOB SERPL: 0.8 (ref 1.1–2.2)
ALP SERPL-CCNC: 143 U/L (ref 45–117)
ALT SERPL-CCNC: 62 U/L (ref 12–78)
ANION GAP SERPL CALC-SCNC: 7 MMOL/L (ref 5–15)
APPEARANCE UR: CLEAR
AST SERPL-CCNC: 28 U/L (ref 15–37)
BACTERIA URNS QL MICRO: NEGATIVE /HPF
BASOPHILS # BLD: 0.1 K/UL (ref 0–0.1)
BASOPHILS NFR BLD: 1 % (ref 0–1)
BILIRUB SERPL-MCNC: 0.6 MG/DL (ref 0.2–1)
BILIRUB UR QL: NEGATIVE
BUN SERPL-MCNC: 18 MG/DL (ref 6–20)
BUN/CREAT SERPL: 14 (ref 12–20)
CALCIUM SERPL-MCNC: 9.3 MG/DL (ref 8.5–10.1)
CHLORIDE SERPL-SCNC: 101 MMOL/L (ref 97–108)
CO2 SERPL-SCNC: 24 MMOL/L (ref 21–32)
COLOR UR: ABNORMAL
COMMENT:: NORMAL
CREAT SERPL-MCNC: 1.25 MG/DL (ref 0.7–1.3)
DIFFERENTIAL METHOD BLD: ABNORMAL
EOSINOPHIL # BLD: 0 K/UL (ref 0–0.4)
EOSINOPHIL NFR BLD: 0 % (ref 0–7)
EPITH CASTS URNS QL MICRO: ABNORMAL /LPF
ERYTHROCYTE [DISTWIDTH] IN BLOOD BY AUTOMATED COUNT: 13.3 % (ref 11.5–14.5)
GLOBULIN SER CALC-MCNC: 4.7 G/DL (ref 2–4)
GLUCOSE SERPL-MCNC: 412 MG/DL (ref 65–100)
GLUCOSE UR STRIP.AUTO-MCNC: >1000 MG/DL
HCT VFR BLD AUTO: 42.6 % (ref 36.6–50.3)
HGB BLD-MCNC: 15.1 G/DL (ref 12.1–17)
HGB UR QL STRIP: ABNORMAL
IMM GRANULOCYTES # BLD AUTO: 0 K/UL (ref 0–0.04)
IMM GRANULOCYTES NFR BLD AUTO: 0 % (ref 0–0.5)
KETONES UR QL STRIP.AUTO: NEGATIVE MG/DL
LEUKOCYTE ESTERASE UR QL STRIP.AUTO: NEGATIVE
LIPASE SERPL-CCNC: 57 U/L (ref 13–75)
LYMPHOCYTES # BLD: 2.2 K/UL (ref 0.8–3.5)
LYMPHOCYTES NFR BLD: 29 % (ref 12–49)
MAGNESIUM SERPL-MCNC: 2.3 MG/DL (ref 1.6–2.4)
MCH RBC QN AUTO: 27.9 PG (ref 26–34)
MCHC RBC AUTO-ENTMCNC: 35.4 G/DL (ref 30–36.5)
MCV RBC AUTO: 78.7 FL (ref 80–99)
MONOCYTES # BLD: 0.5 K/UL (ref 0–1)
MONOCYTES NFR BLD: 7 % (ref 5–13)
NEUTS SEG # BLD: 4.7 K/UL (ref 1.8–8)
NEUTS SEG NFR BLD: 63 % (ref 32–75)
NITRITE UR QL STRIP.AUTO: NEGATIVE
NRBC # BLD: 0 K/UL (ref 0–0.01)
NRBC BLD-RTO: 0 PER 100 WBC
PH UR STRIP: 5 (ref 5–8)
PLATELET # BLD AUTO: 293 K/UL (ref 150–400)
PMV BLD AUTO: 11.3 FL (ref 8.9–12.9)
POTASSIUM SERPL-SCNC: 4.3 MMOL/L (ref 3.5–5.1)
PROT SERPL-MCNC: 8.6 G/DL (ref 6.4–8.2)
PROT UR STRIP-MCNC: NEGATIVE MG/DL
RBC # BLD AUTO: 5.41 M/UL (ref 4.1–5.7)
RBC #/AREA URNS HPF: ABNORMAL /HPF (ref 0–5)
SODIUM SERPL-SCNC: 132 MMOL/L (ref 136–145)
SP GR UR REFRACTOMETRY: 1.02 (ref 1–1.03)
SPECIMEN HOLD: NORMAL
SPECIMEN HOLD: NORMAL
UROBILINOGEN UR QL STRIP.AUTO: 0.2 EU/DL (ref 0.2–1)
WBC # BLD AUTO: 7.4 K/UL (ref 4.1–11.1)
WBC URNS QL MICRO: ABNORMAL /HPF (ref 0–4)

## 2024-02-03 PROCEDURE — 96375 TX/PRO/DX INJ NEW DRUG ADDON: CPT

## 2024-02-03 PROCEDURE — 74177 CT ABD & PELVIS W/CONTRAST: CPT

## 2024-02-03 PROCEDURE — 83735 ASSAY OF MAGNESIUM: CPT

## 2024-02-03 PROCEDURE — 36415 COLL VENOUS BLD VENIPUNCTURE: CPT

## 2024-02-03 PROCEDURE — 81001 URINALYSIS AUTO W/SCOPE: CPT

## 2024-02-03 PROCEDURE — 6360000004 HC RX CONTRAST MEDICATION: Performed by: RADIOLOGY

## 2024-02-03 PROCEDURE — 6360000002 HC RX W HCPCS: Performed by: STUDENT IN AN ORGANIZED HEALTH CARE EDUCATION/TRAINING PROGRAM

## 2024-02-03 PROCEDURE — 99285 EMERGENCY DEPT VISIT HI MDM: CPT

## 2024-02-03 PROCEDURE — 2580000003 HC RX 258: Performed by: STUDENT IN AN ORGANIZED HEALTH CARE EDUCATION/TRAINING PROGRAM

## 2024-02-03 PROCEDURE — 83690 ASSAY OF LIPASE: CPT

## 2024-02-03 PROCEDURE — 80053 COMPREHEN METABOLIC PANEL: CPT

## 2024-02-03 PROCEDURE — 85025 COMPLETE CBC W/AUTO DIFF WBC: CPT

## 2024-02-03 PROCEDURE — 76705 ECHO EXAM OF ABDOMEN: CPT

## 2024-02-03 PROCEDURE — 96361 HYDRATE IV INFUSION ADD-ON: CPT

## 2024-02-03 PROCEDURE — 6370000000 HC RX 637 (ALT 250 FOR IP): Performed by: STUDENT IN AN ORGANIZED HEALTH CARE EDUCATION/TRAINING PROGRAM

## 2024-02-03 PROCEDURE — 96374 THER/PROPH/DIAG INJ IV PUSH: CPT

## 2024-02-03 RX ORDER — ONDANSETRON 2 MG/ML
4 INJECTION INTRAMUSCULAR; INTRAVENOUS EVERY 6 HOURS PRN
Status: DISCONTINUED | OUTPATIENT
Start: 2024-02-03 | End: 2024-02-04 | Stop reason: HOSPADM

## 2024-02-03 RX ORDER — KETOROLAC TROMETHAMINE 30 MG/ML
15 INJECTION, SOLUTION INTRAMUSCULAR; INTRAVENOUS
Status: COMPLETED | OUTPATIENT
Start: 2024-02-03 | End: 2024-02-03

## 2024-02-03 RX ORDER — 0.9 % SODIUM CHLORIDE 0.9 %
1000 INTRAVENOUS SOLUTION INTRAVENOUS ONCE
Status: COMPLETED | OUTPATIENT
Start: 2024-02-03 | End: 2024-02-03

## 2024-02-03 RX ADMIN — ONDANSETRON 4 MG: 2 INJECTION INTRAMUSCULAR; INTRAVENOUS at 23:27

## 2024-02-03 RX ADMIN — SODIUM CHLORIDE 1000 ML: 9 INJECTION, SOLUTION INTRAVENOUS at 21:47

## 2024-02-03 RX ADMIN — Medication 40 ML: at 23:51

## 2024-02-03 RX ADMIN — IOPAMIDOL 100 ML: 755 INJECTION, SOLUTION INTRAVENOUS at 23:37

## 2024-02-03 RX ADMIN — KETOROLAC TROMETHAMINE 15 MG: 30 INJECTION, SOLUTION INTRAMUSCULAR; INTRAVENOUS at 21:47

## 2024-02-03 ASSESSMENT — PAIN - FUNCTIONAL ASSESSMENT
PAIN_FUNCTIONAL_ASSESSMENT: ACTIVITIES ARE NOT PREVENTED
PAIN_FUNCTIONAL_ASSESSMENT: 0-10

## 2024-02-03 ASSESSMENT — PAIN DESCRIPTION - ORIENTATION: ORIENTATION: LEFT;RIGHT;UPPER

## 2024-02-03 ASSESSMENT — PAIN DESCRIPTION - FREQUENCY: FREQUENCY: CONTINUOUS

## 2024-02-03 ASSESSMENT — PAIN SCALES - GENERAL: PAINLEVEL_OUTOF10: 8

## 2024-02-03 ASSESSMENT — PAIN DESCRIPTION - PAIN TYPE: TYPE: ACUTE PAIN

## 2024-02-03 ASSESSMENT — PAIN DESCRIPTION - DESCRIPTORS: DESCRIPTORS: CRAMPING;TENDER

## 2024-02-03 ASSESSMENT — PAIN DESCRIPTION - LOCATION: LOCATION: ABDOMEN

## 2024-02-04 LAB
GLUCOSE BLD STRIP.AUTO-MCNC: 401 MG/DL (ref 65–117)
SERVICE CMNT-IMP: ABNORMAL

## 2024-02-04 PROCEDURE — 2500000003 HC RX 250 WO HCPCS: Performed by: STUDENT IN AN ORGANIZED HEALTH CARE EDUCATION/TRAINING PROGRAM

## 2024-02-04 PROCEDURE — 6370000000 HC RX 637 (ALT 250 FOR IP): Performed by: STUDENT IN AN ORGANIZED HEALTH CARE EDUCATION/TRAINING PROGRAM

## 2024-02-04 PROCEDURE — 82962 GLUCOSE BLOOD TEST: CPT

## 2024-02-04 PROCEDURE — 2580000003 HC RX 258: Performed by: STUDENT IN AN ORGANIZED HEALTH CARE EDUCATION/TRAINING PROGRAM

## 2024-02-04 PROCEDURE — 96375 TX/PRO/DX INJ NEW DRUG ADDON: CPT

## 2024-02-04 PROCEDURE — A4216 STERILE WATER/SALINE, 10 ML: HCPCS | Performed by: STUDENT IN AN ORGANIZED HEALTH CARE EDUCATION/TRAINING PROGRAM

## 2024-02-04 RX ORDER — INSULIN LISPRO 100 [IU]/ML
10 INJECTION, SOLUTION INTRAVENOUS; SUBCUTANEOUS ONCE
Status: COMPLETED | OUTPATIENT
Start: 2024-02-04 | End: 2024-02-04

## 2024-02-04 RX ORDER — ONDANSETRON 4 MG/1
4 TABLET, ORALLY DISINTEGRATING ORAL 3 TIMES DAILY PRN
Qty: 21 TABLET | Refills: 0 | Status: SHIPPED | OUTPATIENT
Start: 2024-02-04

## 2024-02-04 RX ORDER — FAMOTIDINE 20 MG/1
20 TABLET, FILM COATED ORAL 2 TIMES DAILY
Qty: 14 TABLET | Refills: 0 | Status: SHIPPED | OUTPATIENT
Start: 2024-02-04 | End: 2024-02-11

## 2024-02-04 RX ADMIN — INSULIN LISPRO 10 UNITS: 100 INJECTION, SOLUTION INTRAVENOUS; SUBCUTANEOUS at 00:25

## 2024-02-04 RX ADMIN — FAMOTIDINE 20 MG: 10 INJECTION, SOLUTION INTRAVENOUS at 00:06

## 2024-02-04 NOTE — ED PROVIDER NOTES
[facility] EMERGENCY DEP  EMERGENCY DEPARTMENT ENCOUNTER      Pt Name: Marcos Tidwell  MRN: [id number]  Birthdate 1970  Date of evaluation: 2/3/2024  Provider: Omari Ordoñez DO    CHIEF COMPLAINT       Chief Complaint   Patient presents with    Abdominal Pain         HISTORY OF PRESENT ILLNESS   (Location/Symptom, Timing/Onset, Context/Setting, Quality, Duration, Modifying Factors, Severity)  Note limiting factors.   Patient is a 54-year-old who presents today with abdominal discomfort and bloating for the past 1 day.  He reports that he has had aching pain in the upper abdomen that does not radiate.  Nothing particular makes it better or worse.  He has a decrease in appetite.  Has had a little bit of nausea and vomiting in the morning.  Orts frequent nondiarrheal stools.  No blood in emesis or stools.  No fevers.  No chest pain or shortness of breath.            Review of External Medical Records:     Nursing Notes were reviewed.    REVIEW OF SYSTEMS    (2-9 systems for level 4, 10 or more for level 5)     Review of Systems   Constitutional:  Negative for fever.   Respiratory:  Negative for shortness of breath.    Cardiovascular:  Negative for chest pain.   Gastrointestinal:  Positive for abdominal pain, nausea and vomiting.       Except as noted above the remainder of the review of systems was reviewed and negative.       PAST MEDICAL HISTORY     Past Medical History:   Diagnosis Date    Abnormal EKG 05/17/2019    Chest pain 10/16/2022    Diverticulosis 06/04/2019    DM2 (diabetes mellitus, type 2) ([facility]) 05/17/2019    Dyslipidemia     Grade I diastolic dysfunction 05/31/2019    echo    Headache 11/06/2020    Hemorrhoids 06/04/2019    History of nuclear stress test 10/24/2022    Stress Combined Conclusion: Normal treadmill myocardial perfusion study at 87 %% PHR. Findings suggest a low risk of myocardial ischemia    HTN (hypertension)     Low back pain     Migraine without aura and without status

## 2024-02-04 NOTE — ED TRIAGE NOTES
Pt ambulatory to triage co upper abdominal pain since last night. States he has had frequent Bms (not diarrhea) and has one episode of vomiting. Pt tender on palpation of upper abdomen. Denies recent abx use.

## 2024-02-04 NOTE — DISCHARGE INSTRUCTIONS
Your blood sugar was really high tonight but you were not in DKA. Please make sure to watch your diet, exercise, and monitor your sugars closely. High blood sugars can be dangerous if left untreated. Please follow up with your primary doctor this week to have your sugars re-evaluated.

## 2024-02-05 ENCOUNTER — OFFICE VISIT (OUTPATIENT)
Facility: CLINIC | Age: 54
End: 2024-02-05
Payer: MEDICAID

## 2024-02-05 VITALS
DIASTOLIC BLOOD PRESSURE: 92 MMHG | SYSTOLIC BLOOD PRESSURE: 135 MMHG | HEART RATE: 73 BPM | TEMPERATURE: 98 F | OXYGEN SATURATION: 98 % | BODY MASS INDEX: 32.2 KG/M2 | HEIGHT: 74 IN | RESPIRATION RATE: 17 BRPM | WEIGHT: 250.9 LBS

## 2024-02-05 DIAGNOSIS — I51.89 GRADE I DIASTOLIC DYSFUNCTION: ICD-10-CM

## 2024-02-05 DIAGNOSIS — K76.0 HEPATIC STEATOSIS: ICD-10-CM

## 2024-02-05 DIAGNOSIS — E11.9 TYPE 2 DIABETES MELLITUS WITHOUT COMPLICATION, WITHOUT LONG-TERM CURRENT USE OF INSULIN (HCC): Primary | ICD-10-CM

## 2024-02-05 DIAGNOSIS — I10 PRIMARY HYPERTENSION: ICD-10-CM

## 2024-02-05 DIAGNOSIS — E66.9 OBESITY (BMI 30.0-34.9): ICD-10-CM

## 2024-02-05 DIAGNOSIS — R10.84 GENERALIZED ABDOMINAL PAIN: ICD-10-CM

## 2024-02-05 DIAGNOSIS — R35.1 NOCTURIA: ICD-10-CM

## 2024-02-05 DIAGNOSIS — E78.5 DYSLIPIDEMIA: ICD-10-CM

## 2024-02-05 PROBLEM — R51.9 HEADACHE: Status: RESOLVED | Noted: 2020-11-06 | Resolved: 2024-02-05

## 2024-02-05 PROBLEM — M79.675 TOE PAIN, CHRONIC, LEFT: Status: RESOLVED | Noted: 2022-12-20 | Resolved: 2024-02-05

## 2024-02-05 PROBLEM — K64.9 HEMORRHOIDS: Status: RESOLVED | Noted: 2019-06-04 | Resolved: 2024-02-05

## 2024-02-05 PROBLEM — R07.9 CHEST PAIN: Status: RESOLVED | Noted: 2022-10-16 | Resolved: 2024-02-05

## 2024-02-05 PROBLEM — S83.242A ACUTE MEDIAL MENISCUS TEAR OF LEFT KNEE: Status: RESOLVED | Noted: 2022-04-04 | Resolved: 2024-02-05

## 2024-02-05 PROBLEM — G89.29 TOE PAIN, CHRONIC, LEFT: Status: RESOLVED | Noted: 2022-12-20 | Resolved: 2024-02-05

## 2024-02-05 PROBLEM — M25.569 KNEE PAIN: Status: RESOLVED | Noted: 2022-04-04 | Resolved: 2024-02-05

## 2024-02-05 PROBLEM — L30.4 INTERTRIGO: Status: RESOLVED | Noted: 2019-05-17 | Resolved: 2024-02-05

## 2024-02-05 LAB
CHOLEST SERPL-MCNC: 125 MG/DL
CREAT UR-MCNC: 274 MG/DL
EST. AVERAGE GLUCOSE BLD GHB EST-MCNC: 235 MG/DL
HBA1C MFR BLD: 9.8 % (ref 4–5.6)
HBV SURFACE AB SER QL: NONREACTIVE
HBV SURFACE AB SER-ACNC: <3.1 MIU/ML
HBV SURFACE AG SER QL: <0.1 INDEX
HBV SURFACE AG SER QL: NEGATIVE
HDLC SERPL-MCNC: 48 MG/DL
HDLC SERPL: 2.6 (ref 0–5)
LDLC SERPL CALC-MCNC: 45.8 MG/DL (ref 0–100)
MICROALBUMIN UR-MCNC: 9.03 MG/DL
MICROALBUMIN/CREAT UR-RTO: 33 MG/G (ref 0–30)
PSA SERPL-MCNC: 0.4 NG/ML (ref 0.01–4)
TRIGL SERPL-MCNC: 156 MG/DL
VLDLC SERPL CALC-MCNC: 31.2 MG/DL

## 2024-02-05 PROCEDURE — 36415 COLL VENOUS BLD VENIPUNCTURE: CPT | Performed by: INTERNAL MEDICINE

## 2024-02-05 PROCEDURE — 3080F DIAST BP >= 90 MM HG: CPT | Performed by: INTERNAL MEDICINE

## 2024-02-05 PROCEDURE — 3075F SYST BP GE 130 - 139MM HG: CPT | Performed by: INTERNAL MEDICINE

## 2024-02-05 PROCEDURE — 99214 OFFICE O/P EST MOD 30 MIN: CPT | Performed by: INTERNAL MEDICINE

## 2024-02-05 RX ORDER — CLOTRIMAZOLE AND BETAMETHASONE DIPROPIONATE 10; .64 MG/G; MG/G
CREAM TOPICAL 2 TIMES DAILY
Qty: 45 G | Refills: 11 | Status: SHIPPED | OUTPATIENT
Start: 2024-02-05

## 2024-02-05 RX ORDER — INDOMETHACIN 50 MG/1
50 CAPSULE ORAL 3 TIMES DAILY
Qty: 60 CAPSULE | Status: CANCELLED | OUTPATIENT
Start: 2024-02-05

## 2024-02-05 RX ORDER — PANTOPRAZOLE SODIUM 40 MG/1
40 TABLET, DELAYED RELEASE ORAL
Qty: 90 TABLET | Refills: 1 | Status: SHIPPED | OUTPATIENT
Start: 2024-02-05

## 2024-02-05 RX ORDER — METFORMIN HYDROCHLORIDE 500 MG/1
1000 TABLET, EXTENDED RELEASE ORAL
Qty: 360 TABLET | Refills: 3 | Status: SHIPPED | OUTPATIENT
Start: 2024-02-05

## 2024-02-05 RX ORDER — DILTIAZEM HYDROCHLORIDE 180 MG/1
180 CAPSULE, EXTENDED RELEASE ORAL DAILY
Qty: 30 CAPSULE | Refills: 11 | Status: SHIPPED | OUTPATIENT
Start: 2024-02-05

## 2024-02-05 RX ORDER — LOSARTAN POTASSIUM 100 MG/1
100 TABLET ORAL DAILY
Qty: 30 TABLET | Refills: 11 | Status: SHIPPED | OUTPATIENT
Start: 2024-02-05

## 2024-02-05 RX ORDER — METFORMIN HYDROCHLORIDE 500 MG/1
1000 TABLET, EXTENDED RELEASE ORAL
Qty: 180 TABLET | Refills: 3 | Status: SHIPPED | OUTPATIENT
Start: 2024-02-05 | End: 2024-02-05

## 2024-02-05 SDOH — ECONOMIC STABILITY: HOUSING INSECURITY
IN THE LAST 12 MONTHS, WAS THERE A TIME WHEN YOU DID NOT HAVE A STEADY PLACE TO SLEEP OR SLEPT IN A SHELTER (INCLUDING NOW)?: NO

## 2024-02-05 SDOH — ECONOMIC STABILITY: FOOD INSECURITY: WITHIN THE PAST 12 MONTHS, YOU WORRIED THAT YOUR FOOD WOULD RUN OUT BEFORE YOU GOT MONEY TO BUY MORE.: NEVER TRUE

## 2024-02-05 SDOH — ECONOMIC STABILITY: INCOME INSECURITY: HOW HARD IS IT FOR YOU TO PAY FOR THE VERY BASICS LIKE FOOD, HOUSING, MEDICAL CARE, AND HEATING?: NOT HARD AT ALL

## 2024-02-05 SDOH — ECONOMIC STABILITY: FOOD INSECURITY: WITHIN THE PAST 12 MONTHS, THE FOOD YOU BOUGHT JUST DIDN'T LAST AND YOU DIDN'T HAVE MONEY TO GET MORE.: NEVER TRUE

## 2024-02-05 ASSESSMENT — PATIENT HEALTH QUESTIONNAIRE - PHQ9
SUM OF ALL RESPONSES TO PHQ QUESTIONS 1-9: 0
SUM OF ALL RESPONSES TO PHQ QUESTIONS 1-9: 0
SUM OF ALL RESPONSES TO PHQ9 QUESTIONS 1 & 2: 0
SUM OF ALL RESPONSES TO PHQ QUESTIONS 1-9: 0
SUM OF ALL RESPONSES TO PHQ QUESTIONS 1-9: 0
1. LITTLE INTEREST OR PLEASURE IN DOING THINGS: 0
2. FEELING DOWN, DEPRESSED OR HOPELESS: 0

## 2024-02-05 NOTE — PROGRESS NOTES
Patient identified with two identification factors, Name and Date of Birth.    Chief Complaint   Patient presents with    Follow-up     Stomach pains issue since 02/04/24 the urge to use the restroom but not able vomit one time yellow content. Vomit this morning clear content. Drinking plenty fluids. Pt denies Flu and shingle vaccine.       BP (!) 135/92 (Site: Left Upper Arm, Position: Sitting, Cuff Size: Large Adult)   Pulse 73   Temp 98 °F (36.7 °C) (Temporal)   Resp 17   Ht 1.88 m (6' 2\")   Wt 113.8 kg (250 lb 14.4 oz)   SpO2 98%   BMI 32.21 kg/m²       1. \"Have you been to the ER, urgent care clinic since your last visit?  Hospitalized since your last visit?\" Yes. 02/04/24 [facility] ED     2. \"Have you seen or consulted any other health care providers outside of the [facility] since your last visit?\" No    
Stability: Unknown (2/5/2024)    Housing Stability Vital Sign     Unstable Housing in the Last Year: No     Family History   Problem Relation Age of Onset    Diabetes Mother        OBJECTIVE:    BP (!) 135/92 (Site: Left Upper Arm, Position: Sitting, Cuff Size: Large Adult)   Pulse 73   Temp 98 °F (36.7 °C) (Temporal)   Resp 17   Ht 1.88 m (6' 2\")   Wt 113.8 kg (250 lb 14.4 oz)   SpO2 98%   BMI 32.21 kg/m²   CONSTITUTIONAL: well , well nourished, appears age appropriate  EYES: perrla, eom intact  ENMT:moist mucous membranes, pharynx clear  NECK: supple. Thyroid normal  RESPIRATORY: Chest: clear bilaterally   CARDIOVASCULAR: Heart: regular rate and rhythm  GASTROINTESTINAL: Abdomen: soft, bowel sounds active  HEMATOLOGIC: no pathological lymph nodes palpated  MUSCULOSKELETAL: Extremities: no edema, pulse 1+ Foot exam reveals no lesions.  Sensation is intact to fine filament. Absent vibratory sense.  Pulses are diminished.        INTEGUMENT: No unusual rashes or suspicious skin lesions noted. Nails appear normal.  NEUROLOGIC: non-focal exam   MENTAL STATUS: alert and oriented, appropriate affect           ASSESSMENT:  1. Type 2 diabetes mellitus without complication, without long-term current use of insulin (HCC)    2. Hepatic steatosis    3. Generalized abdominal pain    4. Dyslipidemia    5. Grade I diastolic dysfunction    6. Primary hypertension    7. Obesity (BMI 30.0-34.9)    8. Nocturia      Diabetes will be assessed with a hemoglobin A1c and report to him the results.  He is currently on Metformin.      Hepatic steatosis.  He is a non drinker so I think this is all related to nonalcoholic fatty liver disease.      Abdominal pain I think is related to gastritis and we will give him Protonix twice a day for the next five days and once a day thereafter and then prn after it quiets down.    Dyslipidemia is controlled with Atorvastatin and we will check the lipid panel today.    No symptoms related to grade

## 2024-02-06 LAB — HBV CORE AB SERPL QL IA: NEGATIVE

## 2024-02-14 ENCOUNTER — APPOINTMENT (OUTPATIENT)
Facility: HOSPITAL | Age: 54
End: 2024-02-14
Payer: MEDICAID

## 2024-02-14 ENCOUNTER — HOSPITAL ENCOUNTER (EMERGENCY)
Facility: HOSPITAL | Age: 54
Discharge: HOME | End: 2024-02-14
Attending: EMERGENCY MEDICINE
Payer: MEDICAID

## 2024-02-14 ENCOUNTER — TELEPHONE (OUTPATIENT)
Facility: CLINIC | Age: 54
End: 2024-02-14

## 2024-02-14 VITALS
HEIGHT: 74 IN | TEMPERATURE: 97.7 F | BODY MASS INDEX: 31.55 KG/M2 | DIASTOLIC BLOOD PRESSURE: 96 MMHG | HEART RATE: 78 BPM | SYSTOLIC BLOOD PRESSURE: 154 MMHG | OXYGEN SATURATION: 97 % | RESPIRATION RATE: 18 BRPM | WEIGHT: 245.81 LBS

## 2024-02-14 DIAGNOSIS — E11.65 TYPE 2 DIABETES MELLITUS WITH HYPERGLYCEMIA, WITHOUT LONG-TERM CURRENT USE OF INSULIN (HCC): Primary | ICD-10-CM

## 2024-02-14 LAB
ALBUMIN SERPL-MCNC: 3.6 G/DL (ref 3.5–5)
ALBUMIN/GLOB SERPL: 0.8 (ref 1.1–2.2)
ALP SERPL-CCNC: 133 U/L (ref 45–117)
ALT SERPL-CCNC: 49 U/L (ref 12–78)
ANION GAP SERPL CALC-SCNC: 7 MMOL/L (ref 5–15)
APPEARANCE UR: CLEAR
AST SERPL-CCNC: 27 U/L (ref 15–37)
BACTERIA URNS QL MICRO: NEGATIVE /HPF
BASOPHILS # BLD: 0 K/UL (ref 0–0.1)
BASOPHILS NFR BLD: 1 % (ref 0–1)
BILIRUB SERPL-MCNC: 0.8 MG/DL (ref 0.2–1)
BILIRUB UR QL: NEGATIVE
BUN SERPL-MCNC: 13 MG/DL (ref 6–20)
BUN/CREAT SERPL: 10 (ref 12–20)
CALCIUM SERPL-MCNC: 9.4 MG/DL (ref 8.5–10.1)
CHLORIDE SERPL-SCNC: 100 MMOL/L (ref 97–108)
CO2 SERPL-SCNC: 24 MMOL/L (ref 21–32)
COLOR UR: ABNORMAL
COMMENT:: NORMAL
CREAT SERPL-MCNC: 1.25 MG/DL (ref 0.7–1.3)
DIFFERENTIAL METHOD BLD: ABNORMAL
EOSINOPHIL # BLD: 0 K/UL (ref 0–0.4)
EOSINOPHIL NFR BLD: 0 % (ref 0–7)
EPITH CASTS URNS QL MICRO: ABNORMAL /LPF
ERYTHROCYTE [DISTWIDTH] IN BLOOD BY AUTOMATED COUNT: 12.8 % (ref 11.5–14.5)
GLOBULIN SER CALC-MCNC: 4.4 G/DL (ref 2–4)
GLUCOSE BLD STRIP.AUTO-MCNC: 299 MG/DL (ref 65–117)
GLUCOSE BLD STRIP.AUTO-MCNC: 332 MG/DL (ref 65–117)
GLUCOSE SERPL-MCNC: 564 MG/DL (ref 65–100)
GLUCOSE UR STRIP.AUTO-MCNC: 250 MG/DL
HCT VFR BLD AUTO: 44.3 % (ref 36.6–50.3)
HGB BLD-MCNC: 15.7 G/DL (ref 12.1–17)
HGB UR QL STRIP: NEGATIVE
IMM GRANULOCYTES # BLD AUTO: 0 K/UL (ref 0–0.04)
IMM GRANULOCYTES NFR BLD AUTO: 0 % (ref 0–0.5)
KETONES UR QL STRIP.AUTO: NEGATIVE MG/DL
LEUKOCYTE ESTERASE UR QL STRIP.AUTO: NEGATIVE
LIPASE SERPL-CCNC: 54 U/L (ref 13–75)
LYMPHOCYTES # BLD: 2 K/UL (ref 0.8–3.5)
LYMPHOCYTES NFR BLD: 42 % (ref 12–49)
MCH RBC QN AUTO: 27.7 PG (ref 26–34)
MCHC RBC AUTO-ENTMCNC: 35.4 G/DL (ref 30–36.5)
MCV RBC AUTO: 78.3 FL (ref 80–99)
MONOCYTES # BLD: 0.3 K/UL (ref 0–1)
MONOCYTES NFR BLD: 6 % (ref 5–13)
NEUTS SEG # BLD: 2.5 K/UL (ref 1.8–8)
NEUTS SEG NFR BLD: 51 % (ref 32–75)
NITRITE UR QL STRIP.AUTO: NEGATIVE
NRBC # BLD: 0 K/UL (ref 0–0.01)
NRBC BLD-RTO: 0 PER 100 WBC
PH UR STRIP: 5.5 (ref 5–8)
PLATELET # BLD AUTO: 131 K/UL (ref 150–400)
PLATELET COMMENT: ABNORMAL
POTASSIUM SERPL-SCNC: 4.2 MMOL/L (ref 3.5–5.1)
PROT SERPL-MCNC: 8 G/DL (ref 6.4–8.2)
PROT UR STRIP-MCNC: NEGATIVE MG/DL
RBC # BLD AUTO: 5.66 M/UL (ref 4.1–5.7)
RBC #/AREA URNS HPF: ABNORMAL /HPF (ref 0–5)
RBC MORPH BLD: ABNORMAL
SERVICE CMNT-IMP: ABNORMAL
SERVICE CMNT-IMP: ABNORMAL
SODIUM SERPL-SCNC: 131 MMOL/L (ref 136–145)
SP GR UR REFRACTOMETRY: 1.01 (ref 1–1.03)
SPECIMEN HOLD: NORMAL
SPECIMEN HOLD: NORMAL
UROBILINOGEN UR QL STRIP.AUTO: 0.2 EU/DL (ref 0.2–1)
WBC # BLD AUTO: 4.8 K/UL (ref 4.1–11.1)
WBC URNS QL MICRO: ABNORMAL /HPF (ref 0–4)

## 2024-02-14 PROCEDURE — 2580000003 HC RX 258: Performed by: EMERGENCY MEDICINE

## 2024-02-14 PROCEDURE — 81001 URINALYSIS AUTO W/SCOPE: CPT

## 2024-02-14 PROCEDURE — 82962 GLUCOSE BLOOD TEST: CPT

## 2024-02-14 PROCEDURE — 74177 CT ABD & PELVIS W/CONTRAST: CPT

## 2024-02-14 PROCEDURE — 36415 COLL VENOUS BLD VENIPUNCTURE: CPT

## 2024-02-14 PROCEDURE — 83690 ASSAY OF LIPASE: CPT

## 2024-02-14 PROCEDURE — 85025 COMPLETE CBC W/AUTO DIFF WBC: CPT

## 2024-02-14 PROCEDURE — 6370000000 HC RX 637 (ALT 250 FOR IP): Performed by: EMERGENCY MEDICINE

## 2024-02-14 PROCEDURE — 6360000004 HC RX CONTRAST MEDICATION: Performed by: EMERGENCY MEDICINE

## 2024-02-14 PROCEDURE — 80053 COMPREHEN METABOLIC PANEL: CPT

## 2024-02-14 PROCEDURE — 96360 HYDRATION IV INFUSION INIT: CPT

## 2024-02-14 PROCEDURE — 99285 EMERGENCY DEPT VISIT HI MDM: CPT

## 2024-02-14 RX ORDER — 0.9 % SODIUM CHLORIDE 0.9 %
1000 INTRAVENOUS SOLUTION INTRAVENOUS ONCE
Status: COMPLETED | OUTPATIENT
Start: 2024-02-14 | End: 2024-02-14

## 2024-02-14 RX ORDER — ONDANSETRON 2 MG/ML
4 INJECTION INTRAMUSCULAR; INTRAVENOUS ONCE
Status: DISCONTINUED | OUTPATIENT
Start: 2024-02-14 | End: 2024-02-15 | Stop reason: HOSPADM

## 2024-02-14 RX ADMIN — INSULIN HUMAN 10 UNITS: 100 INJECTION, SOLUTION PARENTERAL at 17:57

## 2024-02-14 RX ADMIN — IOPAMIDOL 100 ML: 755 INJECTION, SOLUTION INTRAVENOUS at 17:26

## 2024-02-14 RX ADMIN — SODIUM CHLORIDE 1000 ML: 9 INJECTION, SOLUTION INTRAVENOUS at 17:57

## 2024-02-14 ASSESSMENT — PAIN SCALES - GENERAL: PAINLEVEL_OUTOF10: 0

## 2024-02-14 NOTE — TELEPHONE ENCOUNTER
pt states his blood sugar level is still over 300 and is concerned. he is having numbness in his rt fingers and toes.

## 2024-02-14 NOTE — ED TRIAGE NOTES
1:37 PM  I have evaluated the patient as the Provider in Rapid Medical Evaluation (RME). I have reviewed his vital signs and the triage nurse assessment. I have talked with the patient and any available family and advised that I am the provider in triage and have ordered the appropriate study to initiate their work up based on the clinical presentation during my assessment. I have advised that the patient will be accommodated in the Main ED as soon as possible. I have also requested to contact the triage nurse or myself immediately if the patient experiences any changes in their condition during this brief waiting period.    Was seen on Feb. 3 with complaints of abdominal bloating and pain.  Negative workup with the exception of hyperglycemia.  Patient was given IV fluids and discharged home.  Returns today with continued hyperglycemia and an overall feeling of unwell. Sugars running in the 300's. Positive nausea, no vomiting.  Margareth Alexander, KAMRAN - NP

## 2024-02-14 NOTE — ED TRIAGE NOTES
Pt arrived ambulatory to the ER with CC elevated blood sugars 300-400s since being seen for abdominal discomfort ~week ago. +N/V/D. Pt states he \"feels out of it\". Takes metformin.

## 2024-02-15 NOTE — ED NOTES
ED Course as of 02/14/24 2239 Wed Feb 14, 2024 2054 Signout received from Dr. Mireles.  Patient pending UA and reevaluation  In summary: DM with hyperglycemia   [ZD]      ED Course User Index  [ZD] Leon Green MD         Vitals:    02/14/24 1334 02/14/24 2130   BP: (!) 163/93 (!) 154/96   Pulse: 82 78   Resp: 18 18   Temp: 97.4 °F (36.3 °C) 97.7 °F (36.5 °C)   TempSrc:  Oral   SpO2: 98% 97%   Weight: 111.5 kg (245 lb 13 oz)    Height: 1.88 m (6' 2\")            Results for orders placed or performed during the hospital encounter of 02/14/24   Urine Culture Hold Sample    Specimen: Urine   Result Value Ref Range    Specimen HOld        Urine on hold in Microbiology dept for 2 days.  If unpreserved urine is submitted, it cannot be used for addtional testing after 24 hours, recollection will be required.   CBC with Auto Differential   Result Value Ref Range    WBC 4.8 4.1 - 11.1 K/uL    RBC 5.66 4.10 - 5.70 M/uL    Hemoglobin 15.7 12.1 - 17.0 g/dL    Hematocrit 44.3 36.6 - 50.3 %    MCV 78.3 (L) 80.0 - 99.0 FL    MCH 27.7 26.0 - 34.0 PG    MCHC 35.4 30.0 - 36.5 g/dL    RDW 12.8 11.5 - 14.5 %    Platelets 131 (L) 150 - 400 K/uL    Nucleated RBCs 0.0 0  WBC    nRBC 0.00 0.00 - 0.01 K/uL    Neutrophils % 51 32 - 75 %    Lymphocytes % 42 12 - 49 %    Monocytes % 6 5 - 13 %    Eosinophils % 0 0 - 7 %    Basophils % 1 0 - 1 %    Immature Granulocytes 0 0.0 - 0.5 %    Neutrophils Absolute 2.5 1.8 - 8.0 K/UL    Lymphocytes Absolute 2.0 0.8 - 3.5 K/UL    Monocytes Absolute 0.3 0.0 - 1.0 K/UL    Eosinophils Absolute 0.0 0.0 - 0.4 K/UL    Basophils Absolute 0.0 0.0 - 0.1 K/UL    Absolute Immature Granulocyte 0.0 0.00 - 0.04 K/UL    Differential Type SMEAR SCANNED      Platelet Comment Large Platelets      RBC Comment MICROCYTOSIS  1+       CMP   Result Value Ref Range    Sodium 131 (L) 136 - 145 mmol/L    Potassium 4.2 3.5 - 5.1 mmol/L    Chloride 100 97 - 108 mmol/L    CO2 24 21 - 32 mmol/L    Anion Gap 7 5 - 15  mmol/L    Glucose 564 (H) 65 - 100 mg/dL    BUN 13 6 - 20 MG/DL    Creatinine 1.25 0.70 - 1.30 MG/DL    Bun/Cre Ratio 10 (L) 12 - 20      Est, Glom Filt Rate >60 >60 ml/min/1.73m2    Calcium 9.4 8.5 - 10.1 MG/DL    Total Bilirubin 0.8 0.2 - 1.0 MG/DL    ALT 49 12 - 78 U/L    AST 27 15 - 37 U/L    Alk Phosphatase 133 (H) 45 - 117 U/L    Total Protein 8.0 6.4 - 8.2 g/dL    Albumin 3.6 3.5 - 5.0 g/dL    Globulin 4.4 (H) 2.0 - 4.0 g/dL    Albumin/Globulin Ratio 0.8 (L) 1.1 - 2.2     Urinalysis with Microscopic   Result Value Ref Range    Color, UA YELLOW/STRAW      Appearance CLEAR CLEAR      Specific Gravity, UA 1.015 1.003 - 1.030      pH, Urine 5.5 5.0 - 8.0      Protein, UA Negative NEG mg/dL    Glucose,  (A) NEG mg/dL    Ketones, Urine Negative NEG mg/dL    Bilirubin Urine Negative NEG      Blood, Urine Negative NEG      Urobilinogen, Urine 0.2 0.2 - 1.0 EU/dL    Nitrite, Urine Negative NEG      Leukocyte Esterase, Urine Negative NEG      WBC, UA 0-4 0 - 4 /hpf    RBC, UA 0-5 0 - 5 /hpf    Epithelial Cells UA FEW FEW /lpf    BACTERIA, URINE Negative NEG /hpf   Extra Tubes Hold   Result Value Ref Range    Specimen HOld 1BLU 1RED     Comment:        Add-on orders for these samples will be processed based on acceptable specimen integrity and analyte stability, which may vary by analyte.   Lipase   Result Value Ref Range    Lipase 54 13 - 75 U/L   POCT Glucose   Result Value Ref Range    POC Glucose 332 (H) 65 - 117 mg/dL    Performed by: IFRAH Govea    POCT Glucose   Result Value Ref Range    POC Glucose 299 (H) 65 - 117 mg/dL    Performed by: Timothy Natarajan          CT ABDOMEN PELVIS W IV CONTRAST Additional Contrast? None   Final Result   No acute abnormality. Incidental findings as above.             Leon Green MD  02/15/24 9795

## 2024-02-16 ENCOUNTER — TELEPHONE (OUTPATIENT)
Facility: CLINIC | Age: 54
End: 2024-02-16

## 2024-02-16 NOTE — TELEPHONE ENCOUNTER
Patient called stating that the metformin was causing diarrhea.he was not taking with his meal, so he has now started taking 1 tab qam and 2 qpm and will call me on Monday to se how he did through the weekend

## 2024-04-09 ENCOUNTER — OFFICE VISIT (OUTPATIENT)
Facility: CLINIC | Age: 54
End: 2024-04-09
Payer: MEDICAID

## 2024-04-09 VITALS
DIASTOLIC BLOOD PRESSURE: 89 MMHG | OXYGEN SATURATION: 97 % | HEIGHT: 74 IN | TEMPERATURE: 97.7 F | SYSTOLIC BLOOD PRESSURE: 129 MMHG | HEART RATE: 73 BPM | BODY MASS INDEX: 29.56 KG/M2 | WEIGHT: 230.3 LBS | RESPIRATION RATE: 18 BRPM

## 2024-04-09 DIAGNOSIS — E66.9 OBESITY (BMI 30.0-34.9): ICD-10-CM

## 2024-04-09 DIAGNOSIS — H91.93 BILATERAL HEARING LOSS, UNSPECIFIED HEARING LOSS TYPE: ICD-10-CM

## 2024-04-09 DIAGNOSIS — K76.0 HEPATIC STEATOSIS: ICD-10-CM

## 2024-04-09 DIAGNOSIS — E11.9 TYPE 2 DIABETES MELLITUS WITHOUT COMPLICATION, WITHOUT LONG-TERM CURRENT USE OF INSULIN (HCC): Primary | ICD-10-CM

## 2024-04-09 DIAGNOSIS — I10 PRIMARY HYPERTENSION: ICD-10-CM

## 2024-04-09 DIAGNOSIS — E78.5 DYSLIPIDEMIA: ICD-10-CM

## 2024-04-09 PROBLEM — R10.84 GENERALIZED ABDOMINAL PAIN: Status: RESOLVED | Noted: 2024-02-05 | Resolved: 2024-04-09

## 2024-04-09 PROBLEM — R94.31 ABNORMAL EKG: Status: RESOLVED | Noted: 2019-05-17 | Resolved: 2024-04-09

## 2024-04-09 PROBLEM — Z92.89 HISTORY OF NUCLEAR STRESS TEST: Status: RESOLVED | Noted: 2022-10-24 | Resolved: 2024-04-09

## 2024-04-09 PROBLEM — K57.90 DIVERTICULOSIS: Status: RESOLVED | Noted: 2019-06-04 | Resolved: 2024-04-09

## 2024-04-09 PROCEDURE — 3079F DIAST BP 80-89 MM HG: CPT | Performed by: INTERNAL MEDICINE

## 2024-04-09 PROCEDURE — 3074F SYST BP LT 130 MM HG: CPT | Performed by: INTERNAL MEDICINE

## 2024-04-09 PROCEDURE — 99214 OFFICE O/P EST MOD 30 MIN: CPT | Performed by: INTERNAL MEDICINE

## 2024-04-09 PROCEDURE — 3046F HEMOGLOBIN A1C LEVEL >9.0%: CPT | Performed by: INTERNAL MEDICINE

## 2024-04-09 RX ORDER — GLIPIZIDE 10 MG/1
10 TABLET, FILM COATED, EXTENDED RELEASE ORAL DAILY
Qty: 30 TABLET | Refills: 3 | Status: SHIPPED | OUTPATIENT
Start: 2024-04-09

## 2024-04-09 RX ORDER — METFORMIN HYDROCHLORIDE 500 MG/1
500 TABLET, EXTENDED RELEASE ORAL
Qty: 180 TABLET | Refills: 3 | Status: SHIPPED | OUTPATIENT
Start: 2024-04-09

## 2024-04-09 SDOH — ECONOMIC STABILITY: INCOME INSECURITY: HOW HARD IS IT FOR YOU TO PAY FOR THE VERY BASICS LIKE FOOD, HOUSING, MEDICAL CARE, AND HEATING?: NOT HARD AT ALL

## 2024-04-09 SDOH — ECONOMIC STABILITY: FOOD INSECURITY: WITHIN THE PAST 12 MONTHS, THE FOOD YOU BOUGHT JUST DIDN'T LAST AND YOU DIDN'T HAVE MONEY TO GET MORE.: NEVER TRUE

## 2024-04-09 SDOH — ECONOMIC STABILITY: FOOD INSECURITY: WITHIN THE PAST 12 MONTHS, YOU WORRIED THAT YOUR FOOD WOULD RUN OUT BEFORE YOU GOT MONEY TO BUY MORE.: NEVER TRUE

## 2024-04-09 SDOH — ECONOMIC STABILITY: INCOME INSECURITY: IN THE LAST 12 MONTHS, WAS THERE A TIME WHEN YOU WERE NOT ABLE TO PAY THE MORTGAGE OR RENT ON TIME?: NO

## 2024-04-09 SDOH — ECONOMIC STABILITY: TRANSPORTATION INSECURITY
IN THE PAST 12 MONTHS, HAS LACK OF TRANSPORTATION KEPT YOU FROM MEETINGS, WORK, OR FROM GETTING THINGS NEEDED FOR DAILY LIVING?: NO

## 2024-04-09 SDOH — ECONOMIC STABILITY: TRANSPORTATION INSECURITY
IN THE PAST 12 MONTHS, HAS THE LACK OF TRANSPORTATION KEPT YOU FROM MEDICAL APPOINTMENTS OR FROM GETTING MEDICATIONS?: NO

## 2024-04-09 SDOH — ECONOMIC STABILITY: HOUSING INSECURITY: IN THE LAST 12 MONTHS, HOW MANY PLACES HAVE YOU LIVED?: 1

## 2024-04-09 ASSESSMENT — ANXIETY QUESTIONNAIRES
1. FEELING NERVOUS, ANXIOUS, OR ON EDGE: NOT AT ALL
7. FEELING AFRAID AS IF SOMETHING AWFUL MIGHT HAPPEN: NOT AT ALL
5. BEING SO RESTLESS THAT IT IS HARD TO SIT STILL: NOT AT ALL
IF YOU CHECKED OFF ANY PROBLEMS ON THIS QUESTIONNAIRE, HOW DIFFICULT HAVE THESE PROBLEMS MADE IT FOR YOU TO DO YOUR WORK, TAKE CARE OF THINGS AT HOME, OR GET ALONG WITH OTHER PEOPLE: NOT DIFFICULT AT ALL
3. WORRYING TOO MUCH ABOUT DIFFERENT THINGS: NOT AT ALL
6. BECOMING EASILY ANNOYED OR IRRITABLE: NOT AT ALL
2. NOT BEING ABLE TO STOP OR CONTROL WORRYING: NOT AT ALL
4. TROUBLE RELAXING: NOT AT ALL
GAD7 TOTAL SCORE: 0

## 2024-04-09 ASSESSMENT — PATIENT HEALTH QUESTIONNAIRE - PHQ9
SUM OF ALL RESPONSES TO PHQ QUESTIONS 1-9: 0
2. FEELING DOWN, DEPRESSED OR HOPELESS: NOT AT ALL
SUM OF ALL RESPONSES TO PHQ QUESTIONS 1-9: 0
SUM OF ALL RESPONSES TO PHQ QUESTIONS 1-9: 0
SUM OF ALL RESPONSES TO PHQ9 QUESTIONS 1 & 2: 0
1. LITTLE INTEREST OR PLEASURE IN DOING THINGS: NOT AT ALL
SUM OF ALL RESPONSES TO PHQ QUESTIONS 1-9: 0

## 2024-04-09 NOTE — PROGRESS NOTES
Chief Complaint   Patient presents with    Follow-up    Hypertension    Diabetes     Patient here for follow up diabetes and high blood pressure.      \"Have you been to the ER, urgent care clinic since your last visit?  Hospitalized since your last visit?\"    NO    “Have you seen or consulted any other health care providers outside of [facility] since your last visit?”    NO            Click Here for Release of Records Request  
the defined types were placed in this encounter.       Follow-up and Dispositions    Return in about 2 weeks (around 4/23/2024).                ATTENTION:   This medical record was transcribed using an electronic medical records system.  Although proofread, it may and can contain electronic and spelling errors.  Other human spelling and other errors may be present.  Corrections may be executed at a later time.  Please feel free to contact us for any clarifications as needed.

## 2024-04-25 ENCOUNTER — OFFICE VISIT (OUTPATIENT)
Facility: CLINIC | Age: 54
End: 2024-04-25
Payer: MEDICAID

## 2024-04-25 VITALS
SYSTOLIC BLOOD PRESSURE: 119 MMHG | OXYGEN SATURATION: 98 % | RESPIRATION RATE: 16 BRPM | TEMPERATURE: 97.9 F | WEIGHT: 231 LBS | HEIGHT: 74 IN | HEART RATE: 70 BPM | DIASTOLIC BLOOD PRESSURE: 77 MMHG | BODY MASS INDEX: 29.65 KG/M2

## 2024-04-25 DIAGNOSIS — E66.9 OBESITY (BMI 30.0-34.9): ICD-10-CM

## 2024-04-25 DIAGNOSIS — E11.65 TYPE 2 DIABETES MELLITUS WITH HYPERGLYCEMIA, WITHOUT LONG-TERM CURRENT USE OF INSULIN (HCC): Primary | ICD-10-CM

## 2024-04-25 DIAGNOSIS — I51.89 GRADE I DIASTOLIC DYSFUNCTION: ICD-10-CM

## 2024-04-25 DIAGNOSIS — E78.5 DYSLIPIDEMIA: ICD-10-CM

## 2024-04-25 DIAGNOSIS — I10 PRIMARY HYPERTENSION: ICD-10-CM

## 2024-04-25 PROCEDURE — 99214 OFFICE O/P EST MOD 30 MIN: CPT | Performed by: INTERNAL MEDICINE

## 2024-04-25 PROCEDURE — 3046F HEMOGLOBIN A1C LEVEL >9.0%: CPT | Performed by: INTERNAL MEDICINE

## 2024-04-25 PROCEDURE — 3074F SYST BP LT 130 MM HG: CPT | Performed by: INTERNAL MEDICINE

## 2024-04-25 PROCEDURE — 3078F DIAST BP <80 MM HG: CPT | Performed by: INTERNAL MEDICINE

## 2024-04-25 RX ORDER — ATORVASTATIN CALCIUM 10 MG/1
10 TABLET, FILM COATED ORAL DAILY
Qty: 90 TABLET | Refills: 4 | Status: SHIPPED | OUTPATIENT
Start: 2024-04-25

## 2024-04-25 RX ORDER — GLIPIZIDE 10 MG/1
20 TABLET, FILM COATED, EXTENDED RELEASE ORAL DAILY
Qty: 180 TABLET | Refills: 3 | Status: SHIPPED | OUTPATIENT
Start: 2024-04-25

## 2024-04-25 RX ORDER — LOSARTAN POTASSIUM 100 MG/1
100 TABLET ORAL DAILY
Qty: 90 TABLET | Refills: 4 | Status: SHIPPED | OUTPATIENT
Start: 2024-04-25

## 2024-04-25 RX ORDER — DILTIAZEM HYDROCHLORIDE 180 MG/1
180 CAPSULE, EXTENDED RELEASE ORAL DAILY
Qty: 90 CAPSULE | Refills: 4 | Status: SHIPPED | OUTPATIENT
Start: 2024-04-25

## 2024-04-25 SDOH — ECONOMIC STABILITY: INCOME INSECURITY: HOW HARD IS IT FOR YOU TO PAY FOR THE VERY BASICS LIKE FOOD, HOUSING, MEDICAL CARE, AND HEATING?: NOT HARD AT ALL

## 2024-04-25 SDOH — ECONOMIC STABILITY: FOOD INSECURITY: WITHIN THE PAST 12 MONTHS, THE FOOD YOU BOUGHT JUST DIDN'T LAST AND YOU DIDN'T HAVE MONEY TO GET MORE.: NEVER TRUE

## 2024-04-25 SDOH — ECONOMIC STABILITY: FOOD INSECURITY: WITHIN THE PAST 12 MONTHS, YOU WORRIED THAT YOUR FOOD WOULD RUN OUT BEFORE YOU GOT MONEY TO BUY MORE.: NEVER TRUE

## 2024-04-25 ASSESSMENT — PATIENT HEALTH QUESTIONNAIRE - PHQ9
SUM OF ALL RESPONSES TO PHQ QUESTIONS 1-9: 0
SUM OF ALL RESPONSES TO PHQ QUESTIONS 1-9: 0
1. LITTLE INTEREST OR PLEASURE IN DOING THINGS: NOT AT ALL
SUM OF ALL RESPONSES TO PHQ9 QUESTIONS 1 & 2: 0
2. FEELING DOWN, DEPRESSED OR HOPELESS: NOT AT ALL
SUM OF ALL RESPONSES TO PHQ QUESTIONS 1-9: 0
SUM OF ALL RESPONSES TO PHQ QUESTIONS 1-9: 0

## 2024-04-25 ASSESSMENT — ANXIETY QUESTIONNAIRES
4. TROUBLE RELAXING: NOT AT ALL
GAD7 TOTAL SCORE: 0
5. BEING SO RESTLESS THAT IT IS HARD TO SIT STILL: NOT AT ALL
7. FEELING AFRAID AS IF SOMETHING AWFUL MIGHT HAPPEN: NOT AT ALL
2. NOT BEING ABLE TO STOP OR CONTROL WORRYING: NOT AT ALL
1. FEELING NERVOUS, ANXIOUS, OR ON EDGE: NOT AT ALL
3. WORRYING TOO MUCH ABOUT DIFFERENT THINGS: NOT AT ALL
IF YOU CHECKED OFF ANY PROBLEMS ON THIS QUESTIONNAIRE, HOW DIFFICULT HAVE THESE PROBLEMS MADE IT FOR YOU TO DO YOUR WORK, TAKE CARE OF THINGS AT HOME, OR GET ALONG WITH OTHER PEOPLE: NOT DIFFICULT AT ALL
6. BECOMING EASILY ANNOYED OR IRRITABLE: NOT AT ALL

## 2024-04-25 NOTE — PROGRESS NOTES
SPORTS MEDICINE AND PRIMARY CARE  Mayank Valadez MD, FACP, CMD  [address] [facility] [address]  Phone:  [phone]  Fax: [phone]       Chief Complaint   Patient presents with    Follow-up   .      SUBJECTIVE:    Marcos Tidwell is a 54 y.o. male Patient returns today with a known history of uncontrolled diabetes, obesity, hypertension, hepatic steatosis, hearing deficit, grade 1 diastolic dysfunction and dyslipidemia and is seen for evaluation.             Current Outpatient Medications   Medication Sig Dispense Refill    losartan (COZAAR) 100 MG tablet Take 1 tablet by mouth daily 90 tablet 4    atorvastatin (LIPITOR) 10 MG tablet Take 1 tablet by mouth daily 90 tablet 4    dilTIAZem (TIAZAC) 180 MG extended release capsule Take 1 capsule by mouth daily 90 capsule 4    metFORMIN (GLUCOPHAGE-XR) 500 MG extended release tablet Take 1 tablet by mouth 2 times daily (before meals) Take 1 tab before breakfast and 2 tablets before supper for 3 weeks then 2 tablets 2 times a day with meals 180 tablet 3    clotrimazole-betamethasone (LOTRISONE) 1-0.05 % cream Apply topically 2 times daily 45 g 11    pantoprazole (PROTONIX) 40 MG tablet Take 1 tablet by mouth every morning (before breakfast) 90 tablet 1    ondansetron (ZOFRAN-ODT) 4 MG disintegrating tablet Take 1 tablet by mouth 3 times daily as needed for Nausea or Vomiting 21 tablet 0    acetaminophen (TYLENOL) 500 MG tablet Take 2 tablets by mouth every 6 hours as needed      indomethacin (INDOCIN) 50 MG capsule Take 1 capsule by mouth 3 times daily      glipiZIDE (GLUCOTROL XL) 10 MG extended release tablet Take 2 tablets by mouth daily 180 tablet 3    famotidine (PEPCID) 20 MG tablet Take 1 tablet by mouth 2 times daily for 7 days 14 tablet 0     No current facility-administered medications for this visit.     Past Medical History:   Diagnosis Date    Abnormal EKG 05/17/2019    Chest pain 10/16/2022    Diverticulosis 06/04/2019    DM2 (diabetes

## 2024-04-25 NOTE — PROGRESS NOTES
Chief Complaint   Patient presents with    Follow-up       \"Have you been to the ER, urgent care clinic since your last visit?  Hospitalized since your last visit?\"    YES - When: approximately 2  weeks ago.  Where and Why: [facility] High glucose, acid reflux.    “Have you seen or consulted any other health care providers outside of [facility] since your last visit?”    NO            Click Here for Release of Records Request

## 2024-05-27 RX ORDER — GLIPIZIDE 10 MG/1
20 TABLET, FILM COATED, EXTENDED RELEASE ORAL DAILY
Qty: 180 TABLET | Refills: 3 | Status: SHIPPED | OUTPATIENT
Start: 2024-05-27

## 2024-05-29 ENCOUNTER — OFFICE VISIT (OUTPATIENT)
Facility: CLINIC | Age: 54
End: 2024-05-29
Payer: MEDICAID

## 2024-05-29 VITALS
SYSTOLIC BLOOD PRESSURE: 123 MMHG | RESPIRATION RATE: 16 BRPM | WEIGHT: 242.8 LBS | HEART RATE: 71 BPM | HEIGHT: 74 IN | DIASTOLIC BLOOD PRESSURE: 85 MMHG | OXYGEN SATURATION: 98 % | BODY MASS INDEX: 31.16 KG/M2 | TEMPERATURE: 97.8 F

## 2024-05-29 DIAGNOSIS — E66.9 OBESITY (BMI 30.0-34.9): ICD-10-CM

## 2024-05-29 DIAGNOSIS — K76.0 HEPATIC STEATOSIS: ICD-10-CM

## 2024-05-29 DIAGNOSIS — I10 PRIMARY HYPERTENSION: ICD-10-CM

## 2024-05-29 DIAGNOSIS — E11.65 TYPE 2 DIABETES MELLITUS WITH HYPERGLYCEMIA, WITHOUT LONG-TERM CURRENT USE OF INSULIN (HCC): Primary | ICD-10-CM

## 2024-05-29 PROCEDURE — 3079F DIAST BP 80-89 MM HG: CPT | Performed by: INTERNAL MEDICINE

## 2024-05-29 PROCEDURE — 3074F SYST BP LT 130 MM HG: CPT | Performed by: INTERNAL MEDICINE

## 2024-05-29 PROCEDURE — 36415 COLL VENOUS BLD VENIPUNCTURE: CPT | Performed by: INTERNAL MEDICINE

## 2024-05-29 PROCEDURE — 3046F HEMOGLOBIN A1C LEVEL >9.0%: CPT | Performed by: INTERNAL MEDICINE

## 2024-05-29 PROCEDURE — 99214 OFFICE O/P EST MOD 30 MIN: CPT | Performed by: INTERNAL MEDICINE

## 2024-05-29 RX ORDER — FAMOTIDINE 20 MG/1
20 TABLET, FILM COATED ORAL 2 TIMES DAILY
Qty: 14 TABLET | Refills: 0 | Status: CANCELLED | OUTPATIENT
Start: 2024-05-29 | End: 2024-06-05

## 2024-05-29 RX ORDER — PANTOPRAZOLE SODIUM 40 MG/1
40 TABLET, DELAYED RELEASE ORAL
Qty: 90 TABLET | Refills: 3 | Status: SHIPPED | OUTPATIENT
Start: 2024-05-29

## 2024-05-29 SDOH — ECONOMIC STABILITY: FOOD INSECURITY: WITHIN THE PAST 12 MONTHS, YOU WORRIED THAT YOUR FOOD WOULD RUN OUT BEFORE YOU GOT MONEY TO BUY MORE.: NEVER TRUE

## 2024-05-29 SDOH — ECONOMIC STABILITY: FOOD INSECURITY: WITHIN THE PAST 12 MONTHS, THE FOOD YOU BOUGHT JUST DIDN'T LAST AND YOU DIDN'T HAVE MONEY TO GET MORE.: NEVER TRUE

## 2024-05-29 SDOH — ECONOMIC STABILITY: INCOME INSECURITY: HOW HARD IS IT FOR YOU TO PAY FOR THE VERY BASICS LIKE FOOD, HOUSING, MEDICAL CARE, AND HEATING?: NOT HARD AT ALL

## 2024-05-29 ASSESSMENT — ANXIETY QUESTIONNAIRES
1. FEELING NERVOUS, ANXIOUS, OR ON EDGE: NOT AT ALL
5. BEING SO RESTLESS THAT IT IS HARD TO SIT STILL: NOT AT ALL
6. BECOMING EASILY ANNOYED OR IRRITABLE: NOT AT ALL
2. NOT BEING ABLE TO STOP OR CONTROL WORRYING: NOT AT ALL
3. WORRYING TOO MUCH ABOUT DIFFERENT THINGS: NOT AT ALL
IF YOU CHECKED OFF ANY PROBLEMS ON THIS QUESTIONNAIRE, HOW DIFFICULT HAVE THESE PROBLEMS MADE IT FOR YOU TO DO YOUR WORK, TAKE CARE OF THINGS AT HOME, OR GET ALONG WITH OTHER PEOPLE: NOT DIFFICULT AT ALL
GAD7 TOTAL SCORE: 0
7. FEELING AFRAID AS IF SOMETHING AWFUL MIGHT HAPPEN: NOT AT ALL
4. TROUBLE RELAXING: NOT AT ALL

## 2024-05-29 ASSESSMENT — PATIENT HEALTH QUESTIONNAIRE - PHQ9
SUM OF ALL RESPONSES TO PHQ QUESTIONS 1-9: 0
1. LITTLE INTEREST OR PLEASURE IN DOING THINGS: NOT AT ALL
SUM OF ALL RESPONSES TO PHQ QUESTIONS 1-9: 0
SUM OF ALL RESPONSES TO PHQ9 QUESTIONS 1 & 2: 0
SUM OF ALL RESPONSES TO PHQ QUESTIONS 1-9: 0
2. FEELING DOWN, DEPRESSED OR HOPELESS: NOT AT ALL
SUM OF ALL RESPONSES TO PHQ QUESTIONS 1-9: 0

## 2024-05-29 NOTE — PROGRESS NOTES
Chief Complaint   Patient presents with    Diabetes     \"Have you been to the ER, urgent care clinic since your last visit?  Hospitalized since your last visit?\"    NO    “Have you seen or consulted any other health care providers outside of [facility] since your last visit?”    NO            Click Here for Release of Records Request  
123/85 (Site: Left Upper Arm, Position: Sitting, Cuff Size: Large Adult)   Pulse 71   Temp 97.8 °F (36.6 °C) (Oral)   Resp 16   Ht 1.88 m (6' 2\")   Wt 110.1 kg (242 lb 12.8 oz)   SpO2 98%   BMI 31.17 kg/m²   CONSTITUTIONAL: well , well nourished, appears age appropriate  EYES: perrla, eom intact  ENMT:moist mucous membranes, pharynx clear  NECK: supple. Thyroid normal  RESPIRATORY: Chest: clear bilaterally   CARDIOVASCULAR: Heart: regular rate and rhythm  GASTROINTESTINAL: Abdomen: soft, bowel sounds active  HEMATOLOGIC: no pathological lymph nodes palpated  MUSCULOSKELETAL: Extremities: no edema, pulse 1+   INTEGUMENT: No unusual rashes or suspicious skin lesions noted. Nails appear normal.  NEUROLOGIC: non-focal exam   MENTAL STATUS: alert and oriented, appropriate affect           ASSESSMENT:  1. Type 2 diabetes mellitus with hyperglycemia, without long-term current use of insulin (HCC)    2. Obesity (BMI 30.0-34.9)    3. Primary hypertension    4. Hepatic steatosis      By his history, blood sugar control is excellent and we congratulate him. We will confirm this with a hemoglobin A1c.  He cut back on Metformin because blood sugar was dropping.  We suggest instead of cutting back the Metformin that he cut back on the Glucotrol.  He is going to start taking that once a day, continue Metformin 1,000 daily, and if the blood sugar creeps up, would increase Metformin to 1,500 a day or even 2,000 a day.  We would like to see him off the Glipizide completely and use the Glipizide, along with the GLP1 agonist.    Obesity remains a concern.  Since we last saw him, he gained 11 pounds.  He is willing to try a GLP1 inhibitor and we give him Mounjaro to start at 2.5 mg daily.  We will gradually increase the dose.  He has tried different dietary maneuvers without success, as well as physical activity, which is the reason for the GLP1, in addition to controlling his diabetes.    Blood pressure control is at goal.  He

## 2024-05-30 LAB
EST. AVERAGE GLUCOSE BLD GHB EST-MCNC: 217 MG/DL
HBA1C MFR BLD: 9.2 % (ref 4–5.6)

## 2024-05-31 ENCOUNTER — CLINICAL DOCUMENTATION (OUTPATIENT)
Facility: CLINIC | Age: 54
End: 2024-05-31

## 2024-09-18 RX ORDER — ATORVASTATIN CALCIUM 10 MG/1
10 TABLET, FILM COATED ORAL DAILY
Qty: 90 TABLET | Refills: 4 | Status: SHIPPED | OUTPATIENT
Start: 2024-09-18

## 2024-09-18 RX ORDER — GLIPIZIDE 10 MG/1
20 TABLET, FILM COATED, EXTENDED RELEASE ORAL DAILY
Qty: 180 TABLET | Refills: 3 | Status: SHIPPED | OUTPATIENT
Start: 2024-09-18

## 2024-09-18 RX ORDER — LOSARTAN POTASSIUM 100 MG/1
100 TABLET ORAL DAILY
Qty: 90 TABLET | Refills: 4 | Status: SHIPPED | OUTPATIENT
Start: 2024-09-18

## 2024-09-18 RX ORDER — DILTIAZEM HYDROCHLORIDE 180 MG/1
180 CAPSULE, EXTENDED RELEASE ORAL DAILY
Qty: 90 CAPSULE | Refills: 4 | Status: SHIPPED | OUTPATIENT
Start: 2024-09-18

## 2024-09-18 RX ORDER — METFORMIN HCL 500 MG
500 TABLET, EXTENDED RELEASE 24 HR ORAL
Qty: 180 TABLET | Refills: 3 | Status: SHIPPED | OUTPATIENT
Start: 2024-09-18

## 2024-10-01 ENCOUNTER — OFFICE VISIT (OUTPATIENT)
Facility: CLINIC | Age: 54
End: 2024-10-01
Payer: MEDICAID

## 2024-10-01 VITALS
HEIGHT: 74 IN | OXYGEN SATURATION: 97 % | TEMPERATURE: 97.7 F | BODY MASS INDEX: 31.85 KG/M2 | RESPIRATION RATE: 16 BRPM | HEART RATE: 72 BPM | DIASTOLIC BLOOD PRESSURE: 89 MMHG | SYSTOLIC BLOOD PRESSURE: 135 MMHG | WEIGHT: 248.2 LBS

## 2024-10-01 DIAGNOSIS — K76.0 HEPATIC STEATOSIS: ICD-10-CM

## 2024-10-01 DIAGNOSIS — E11.65 TYPE 2 DIABETES MELLITUS WITH HYPERGLYCEMIA, WITHOUT LONG-TERM CURRENT USE OF INSULIN (HCC): Primary | ICD-10-CM

## 2024-10-01 DIAGNOSIS — E11.65 TYPE 2 DIABETES MELLITUS WITH HYPERGLYCEMIA, WITHOUT LONG-TERM CURRENT USE OF INSULIN (HCC): ICD-10-CM

## 2024-10-01 DIAGNOSIS — E78.5 DYSLIPIDEMIA: ICD-10-CM

## 2024-10-01 DIAGNOSIS — I51.89 GRADE I DIASTOLIC DYSFUNCTION: ICD-10-CM

## 2024-10-01 DIAGNOSIS — I10 PRIMARY HYPERTENSION: ICD-10-CM

## 2024-10-01 LAB
EST. AVERAGE GLUCOSE BLD GHB EST-MCNC: 194 MG/DL
HBA1C MFR BLD: 8.4 % (ref 4–5.6)

## 2024-10-01 PROCEDURE — 3079F DIAST BP 80-89 MM HG: CPT | Performed by: INTERNAL MEDICINE

## 2024-10-01 PROCEDURE — 3046F HEMOGLOBIN A1C LEVEL >9.0%: CPT | Performed by: INTERNAL MEDICINE

## 2024-10-01 PROCEDURE — 99214 OFFICE O/P EST MOD 30 MIN: CPT | Performed by: INTERNAL MEDICINE

## 2024-10-01 PROCEDURE — 36415 COLL VENOUS BLD VENIPUNCTURE: CPT | Performed by: INTERNAL MEDICINE

## 2024-10-01 PROCEDURE — 3075F SYST BP GE 130 - 139MM HG: CPT | Performed by: INTERNAL MEDICINE

## 2024-10-01 RX ORDER — CLOTRIMAZOLE AND BETAMETHASONE DIPROPIONATE 10; .64 MG/G; MG/G
CREAM TOPICAL 2 TIMES DAILY
Qty: 45 G | Refills: 11 | Status: SHIPPED | OUTPATIENT
Start: 2024-10-01

## 2024-10-01 RX ORDER — FAMOTIDINE 40 MG/1
40 TABLET, FILM COATED ORAL
COMMUNITY
Start: 2024-04-17

## 2024-10-01 NOTE — PROGRESS NOTES
Chief Complaint   Patient presents with    Follow-up    Diabetes     \"Have you been to the ER, urgent care clinic since your last visit?  Hospitalized since your last visit?\"    YES - When: approximately 2 months ago.  Where and Why: [facility] for burning sensation in chest.    “Have you seen or consulted any other health care providers outside our system since your last visit?”    NO      “Have you had a diabetic eye exam?”    NO     Date of last diabetic eye exam: 11/28/2022

## 2024-10-01 NOTE — PROGRESS NOTES
SPORTS MEDICINE AND PRIMARY CARE  Mayank Valadez MD, FACP, CMD  [address] [facility] [address]  Phone:  [phone]  Fax: [phone]       Chief Complaint   Patient presents with    Follow-up    Diabetes   .      SUBJECTIVE:       Marcos Tidwell is a 54 y.o. male Patient returns today with a known history of uncontrolled diabetes mellitus type 2, diverticulosis, negative nuclear stress test, intertrigo, headache, low back pain, knee pain, migraines, dyslipidemia, diastolic dysfunction, hearing deficit, primary hypertension, hepatic steatosis and is seen for evaluation.      Patient just returned from [place].  Unfortunately, his father passed at the age of 79 of unknown cause.  He was up and about and went to the bedroom and laid down and . The doctor did not give him a particular reason, nor did they give him any health issues.  Patient is seen for evaluation.    While he was in [place] he got some Mounjaro, which he brings back today.             Current Outpatient Medications   Medication Sig Dispense Refill    famotidine (PEPCID) 40 MG tablet 1 tablet      clotrimazole-betamethasone (LOTRISONE) 1-0.05 % cream Apply topically 2 times daily 45 g 11    meloxicam (MOBIC) 15 MG tablet Take 1 tablet by mouth daily 30 tablet 3    metFORMIN (GLUCOPHAGE-XR) 500 MG extended release tablet Take 1 tablet by mouth 2 times daily (before meals) Take 1 tab before breakfast and 2 tablets before supper for 3 weeks then 2 tablets 2 times a day with meals 180 tablet 3    losartan (COZAAR) 100 MG tablet Take 1 tablet by mouth daily 90 tablet 4    atorvastatin (LIPITOR) 10 MG tablet Take 1 tablet by mouth daily 90 tablet 4    dilTIAZem (TIAZAC) 180 MG extended release capsule Take 1 capsule by mouth daily 90 capsule 4    glipiZIDE (GLUCOTROL XL) 10 MG extended release tablet Take 2 tablets by mouth daily 180 tablet 3    pantoprazole (PROTONIX) 40 MG tablet Take 1 tablet by mouth every morning (before breakfast) 90

## 2024-10-04 DIAGNOSIS — E11.65 TYPE 2 DIABETES MELLITUS WITH HYPERGLYCEMIA, WITHOUT LONG-TERM CURRENT USE OF INSULIN (HCC): ICD-10-CM

## 2024-10-04 RX ORDER — LIRAGLUTIDE 6 MG/ML
1.8 INJECTION SUBCUTANEOUS DAILY
Qty: 3 ADJUSTABLE DOSE PRE-FILLED PEN SYRINGE | Refills: 11 | OUTPATIENT
Start: 2024-10-04

## 2024-10-05 RX ORDER — LIRAGLUTIDE 6 MG/ML
1.8 INJECTION SUBCUTANEOUS DAILY
Qty: 3 ADJUSTABLE DOSE PRE-FILLED PEN SYRINGE | Refills: 11 | OUTPATIENT
Start: 2024-10-05

## 2024-10-11 RX ORDER — ATORVASTATIN CALCIUM 10 MG/1
10 TABLET, FILM COATED ORAL DAILY
Qty: 90 TABLET | Refills: 3 | Status: SHIPPED | OUTPATIENT
Start: 2024-10-11

## 2024-10-11 RX ORDER — LOSARTAN POTASSIUM 100 MG/1
100 TABLET ORAL DAILY
Qty: 90 TABLET | Refills: 3 | Status: SHIPPED | OUTPATIENT
Start: 2024-10-11

## 2024-10-11 RX ORDER — DILTIAZEM HYDROCHLORIDE 180 MG/1
180 CAPSULE, EXTENDED RELEASE ORAL DAILY
Qty: 90 CAPSULE | Refills: 3 | Status: SHIPPED | OUTPATIENT
Start: 2024-10-11

## 2024-10-22 ENCOUNTER — CLINICAL DOCUMENTATION (OUTPATIENT)
Facility: CLINIC | Age: 54
End: 2024-10-22

## 2024-10-30 RX ORDER — ATORVASTATIN CALCIUM 10 MG/1
10 TABLET, FILM COATED ORAL DAILY
Qty: 90 TABLET | Refills: 3 | Status: SHIPPED | OUTPATIENT
Start: 2024-10-30

## 2024-12-08 RX ORDER — GLIPIZIDE 10 MG/1
20 TABLET, FILM COATED, EXTENDED RELEASE ORAL DAILY
Qty: 180 TABLET | Refills: 3 | Status: SHIPPED | OUTPATIENT
Start: 2024-12-08

## 2024-12-08 RX ORDER — METFORMIN HYDROCHLORIDE 500 MG/1
500 TABLET, EXTENDED RELEASE ORAL
Qty: 180 TABLET | Refills: 3 | Status: SHIPPED | OUTPATIENT
Start: 2024-12-08

## 2025-01-06 RX ORDER — METFORMIN HYDROCHLORIDE 500 MG/1
500 TABLET, EXTENDED RELEASE ORAL
Qty: 180 TABLET | Refills: 3 | Status: SHIPPED | OUTPATIENT
Start: 2025-01-06

## 2025-01-13 ENCOUNTER — OFFICE VISIT (OUTPATIENT)
Facility: CLINIC | Age: 55
End: 2025-01-13
Payer: MEDICAID

## 2025-01-13 VITALS
HEART RATE: 86 BPM | HEIGHT: 74 IN | SYSTOLIC BLOOD PRESSURE: 125 MMHG | TEMPERATURE: 97.5 F | OXYGEN SATURATION: 97 % | BODY MASS INDEX: 32.08 KG/M2 | WEIGHT: 250 LBS | RESPIRATION RATE: 18 BRPM | DIASTOLIC BLOOD PRESSURE: 80 MMHG

## 2025-01-13 DIAGNOSIS — E11.9 TYPE 2 DIABETES MELLITUS WITHOUT COMPLICATION, WITHOUT LONG-TERM CURRENT USE OF INSULIN (HCC): ICD-10-CM

## 2025-01-13 DIAGNOSIS — I51.89 GRADE I DIASTOLIC DYSFUNCTION: ICD-10-CM

## 2025-01-13 DIAGNOSIS — I10 PRIMARY HYPERTENSION: ICD-10-CM

## 2025-01-13 DIAGNOSIS — K76.0 HEPATIC STEATOSIS: ICD-10-CM

## 2025-01-13 DIAGNOSIS — E11.9 TYPE 2 DIABETES MELLITUS WITHOUT COMPLICATION, WITHOUT LONG-TERM CURRENT USE OF INSULIN (HCC): Primary | ICD-10-CM

## 2025-01-13 DIAGNOSIS — E78.5 DYSLIPIDEMIA: ICD-10-CM

## 2025-01-13 DIAGNOSIS — R35.1 NOCTURIA: ICD-10-CM

## 2025-01-13 PROCEDURE — 3074F SYST BP LT 130 MM HG: CPT | Performed by: INTERNAL MEDICINE

## 2025-01-13 PROCEDURE — 3079F DIAST BP 80-89 MM HG: CPT | Performed by: INTERNAL MEDICINE

## 2025-01-13 PROCEDURE — 99214 OFFICE O/P EST MOD 30 MIN: CPT | Performed by: INTERNAL MEDICINE

## 2025-01-13 RX ORDER — LIRAGLUTIDE 6 MG/ML
1.8 INJECTION SUBCUTANEOUS DAILY
Qty: 3 ADJUSTABLE DOSE PRE-FILLED PEN SYRINGE | Refills: 11 | OUTPATIENT
Start: 2025-01-13

## 2025-01-13 SDOH — ECONOMIC STABILITY: FOOD INSECURITY: WITHIN THE PAST 12 MONTHS, YOU WORRIED THAT YOUR FOOD WOULD RUN OUT BEFORE YOU GOT MONEY TO BUY MORE.: NEVER TRUE

## 2025-01-13 SDOH — ECONOMIC STABILITY: FOOD INSECURITY: WITHIN THE PAST 12 MONTHS, THE FOOD YOU BOUGHT JUST DIDN'T LAST AND YOU DIDN'T HAVE MONEY TO GET MORE.: NEVER TRUE

## 2025-01-13 ASSESSMENT — PATIENT HEALTH QUESTIONNAIRE - PHQ9
SUM OF ALL RESPONSES TO PHQ QUESTIONS 1-9: 0
SUM OF ALL RESPONSES TO PHQ QUESTIONS 1-9: 0
2. FEELING DOWN, DEPRESSED OR HOPELESS: NOT AT ALL
SUM OF ALL RESPONSES TO PHQ QUESTIONS 1-9: 0
SUM OF ALL RESPONSES TO PHQ9 QUESTIONS 1 & 2: 0
1. LITTLE INTEREST OR PLEASURE IN DOING THINGS: NOT AT ALL
SUM OF ALL RESPONSES TO PHQ QUESTIONS 1-9: 0

## 2025-01-13 ASSESSMENT — ANXIETY QUESTIONNAIRES
3. WORRYING TOO MUCH ABOUT DIFFERENT THINGS: NOT AT ALL
6. BECOMING EASILY ANNOYED OR IRRITABLE: NOT AT ALL
5. BEING SO RESTLESS THAT IT IS HARD TO SIT STILL: NOT AT ALL
1. FEELING NERVOUS, ANXIOUS, OR ON EDGE: NOT AT ALL
7. FEELING AFRAID AS IF SOMETHING AWFUL MIGHT HAPPEN: NOT AT ALL
2. NOT BEING ABLE TO STOP OR CONTROL WORRYING: NOT AT ALL
GAD7 TOTAL SCORE: 0
4. TROUBLE RELAXING: NOT AT ALL
IF YOU CHECKED OFF ANY PROBLEMS ON THIS QUESTIONNAIRE, HOW DIFFICULT HAVE THESE PROBLEMS MADE IT FOR YOU TO DO YOUR WORK, TAKE CARE OF THINGS AT HOME, OR GET ALONG WITH OTHER PEOPLE: NOT DIFFICULT AT ALL

## 2025-01-13 NOTE — PROGRESS NOTES
Chief Complaint   Patient presents with    Follow-up    Hypertension    Diabetes     \"Have you been to the ER, urgent care clinic since your last visit?  Hospitalized since your last visit?\"    NO    “Have you seen or consulted any other health care providers outside our system since your last visit?”    NO      “Have you had a diabetic eye exam?”    NO     Date of last diabetic eye exam: 11/28/2022

## 2025-01-13 NOTE — PROGRESS NOTES
SPORTS MEDICINE AND PRIMARY CARE  Mayank Valadez MD, FACP, CMD  [address] [facility] [facility] [address]  Phone:  [phone]  Fax: [phone]       Chief Complaint   Patient presents with    Follow-up    Hypertension    Diabetes   .      SUBJECTIVE:  History of Present Illness         Marcos Tidwell is a 54 y.o. male patient returns today with a known history of primary hypertension, diabetes, dyslipidemia, hepatic steatosis, and grade 1 diastolic dysfunction. He is seen for evaluation.    He has been managing his diabetes well, with no reported issues. He is currently awaiting approval for his next dose of Mounjaro, having already received the first and second doses from [place] at a cost of $ 500 each. He anticipates receiving the 7.5 mg dose tomorrow and plans to travel to [place] in 04/2024 to procure additional doses for a 2 to 3 month supply. He has recently acquired new insurance coverage through Medicaid.    He reports experiencing pain in his right shoulder when sleeping on it. He sought medical attention from an orthopedic specialist who diagnosed him with right shoulder impingement and prescribed meloxicam as needed. This treatment has proven effective in alleviating his pain.    Additionally, he experiences occasional back pain, which he attributes to prolonged sitting or driving. He expresses a desire to undergo a general health checkup, including PSA and kidney function tests.    MEDICATIONS  Current: meloxicam       Current Outpatient Medications   Medication Sig Dispense Refill    Tirzepatide 10 MG/0.5ML SOAJ Inject 10 mg into the skin every 7 days 2 mL 0    metFORMIN (GLUCOPHAGE-XR) 500 MG extended release tablet Take 1 tablet by mouth 2 times daily (before meals) Take 1 tab before breakfast and 2 tablets before supper for 3 weeks then 2 tablets 2 times a day with meals 180 tablet 3    glipiZIDE (GLUCOTROL XL) 10 MG extended release tablet Take 2 tablets by mouth daily 180 tablet 3    atorvastatin

## 2025-01-14 LAB
ALBUMIN SERPL-MCNC: 3.8 G/DL (ref 3.5–5)
ALBUMIN/GLOB SERPL: 1 (ref 1.1–2.2)
ALP SERPL-CCNC: 94 U/L (ref 45–117)
ALT SERPL-CCNC: 57 U/L (ref 12–78)
ANION GAP SERPL CALC-SCNC: 7 MMOL/L (ref 2–12)
APPEARANCE UR: ABNORMAL
AST SERPL-CCNC: 22 U/L (ref 15–37)
BACTERIA URNS QL MICRO: ABNORMAL /HPF
BASOPHILS # BLD: 0.04 K/UL (ref 0–0.1)
BASOPHILS NFR BLD: 0.9 % (ref 0–1)
BILIRUB SERPL-MCNC: 0.7 MG/DL (ref 0.2–1)
BILIRUB UR QL: NEGATIVE
BUN SERPL-MCNC: 18 MG/DL (ref 6–20)
BUN/CREAT SERPL: 16 (ref 12–20)
CALCIUM SERPL-MCNC: 9.8 MG/DL (ref 8.5–10.1)
CHLORIDE SERPL-SCNC: 106 MMOL/L (ref 97–108)
CHOLEST SERPL-MCNC: 142 MG/DL
CO2 SERPL-SCNC: 23 MMOL/L (ref 21–32)
COLOR UR: ABNORMAL
CREAT SERPL-MCNC: 1.12 MG/DL (ref 0.7–1.3)
CREAT UR-MCNC: 307 MG/DL
DIFFERENTIAL METHOD BLD: ABNORMAL
EOSINOPHIL # BLD: 0.02 K/UL (ref 0–0.4)
EOSINOPHIL NFR BLD: 0.4 % (ref 0–7)
EPITH CASTS URNS QL MICRO: ABNORMAL /LPF
ERYTHROCYTE [DISTWIDTH] IN BLOOD BY AUTOMATED COUNT: 12.7 % (ref 11.5–14.5)
EST. AVERAGE GLUCOSE BLD GHB EST-MCNC: 160 MG/DL
GLOBULIN SER CALC-MCNC: 3.9 G/DL (ref 2–4)
GLUCOSE SERPL-MCNC: 169 MG/DL (ref 65–100)
GLUCOSE UR STRIP.AUTO-MCNC: NEGATIVE MG/DL
HBA1C MFR BLD: 7.2 % (ref 4–5.6)
HCT VFR BLD AUTO: 46.7 % (ref 36.6–50.3)
HDLC SERPL-MCNC: 51 MG/DL
HDLC SERPL: 2.8 (ref 0–5)
HGB BLD-MCNC: 15.8 G/DL (ref 12.1–17)
HGB UR QL STRIP: NEGATIVE
IMM GRANULOCYTES # BLD AUTO: 0.01 K/UL (ref 0–0.04)
IMM GRANULOCYTES NFR BLD AUTO: 0.2 % (ref 0–0.5)
KETONES UR QL STRIP.AUTO: 15 MG/DL
LDLC SERPL CALC-MCNC: 63.8 MG/DL (ref 0–100)
LEUKOCYTE ESTERASE UR QL STRIP.AUTO: NEGATIVE
LYMPHOCYTES # BLD: 2.2 K/UL (ref 0.8–3.5)
LYMPHOCYTES NFR BLD: 49.3 % (ref 12–49)
MCH RBC QN AUTO: 27.6 PG (ref 26–34)
MCHC RBC AUTO-ENTMCNC: 33.8 G/DL (ref 30–36.5)
MCV RBC AUTO: 81.6 FL (ref 80–99)
MICROALBUMIN UR-MCNC: 2.04 MG/DL
MICROALBUMIN/CREAT UR-RTO: 7 MG/G (ref 0–30)
MONOCYTES # BLD: 0.36 K/UL (ref 0–1)
MONOCYTES NFR BLD: 8.1 % (ref 5–13)
NEUTS SEG # BLD: 1.83 K/UL (ref 1.8–8)
NEUTS SEG NFR BLD: 41.1 % (ref 32–75)
NITRITE UR QL STRIP.AUTO: NEGATIVE
NRBC # BLD: 0 K/UL (ref 0–0.01)
NRBC BLD-RTO: 0 PER 100 WBC
PH UR STRIP: 5.5 (ref 5–8)
PLATELET # BLD AUTO: 263 K/UL (ref 150–400)
PMV BLD AUTO: 10.7 FL (ref 8.9–12.9)
POTASSIUM SERPL-SCNC: 4.3 MMOL/L (ref 3.5–5.1)
PROT SERPL-MCNC: 7.7 G/DL (ref 6.4–8.2)
PROT UR STRIP-MCNC: ABNORMAL MG/DL
PSA SERPL-MCNC: 0.5 NG/ML (ref 0.01–4)
RBC # BLD AUTO: 5.72 M/UL (ref 4.1–5.7)
RBC #/AREA URNS HPF: ABNORMAL /HPF (ref 0–5)
SODIUM SERPL-SCNC: 136 MMOL/L (ref 136–145)
SP GR UR REFRACTOMETRY: 1.03 (ref 1–1.03)
TRIGL SERPL-MCNC: 136 MG/DL
UROBILINOGEN UR QL STRIP.AUTO: 0.2 EU/DL (ref 0.2–1)
VLDLC SERPL CALC-MCNC: 27.2 MG/DL
WBC # BLD AUTO: 4.5 K/UL (ref 4.1–11.1)
WBC URNS QL MICRO: ABNORMAL /HPF (ref 0–4)

## 2025-01-27 RX ORDER — ATORVASTATIN CALCIUM 10 MG/1
10 TABLET, FILM COATED ORAL DAILY
Qty: 90 TABLET | Refills: 3 | Status: SHIPPED | OUTPATIENT
Start: 2025-01-27 | End: 2025-01-28 | Stop reason: SDUPTHER

## 2025-01-28 ENCOUNTER — OFFICE VISIT (OUTPATIENT)
Facility: CLINIC | Age: 55
End: 2025-01-28
Payer: MEDICAID

## 2025-01-28 VITALS
HEART RATE: 73 BPM | RESPIRATION RATE: 18 BRPM | SYSTOLIC BLOOD PRESSURE: 132 MMHG | DIASTOLIC BLOOD PRESSURE: 89 MMHG | TEMPERATURE: 97.7 F | WEIGHT: 253.2 LBS | BODY MASS INDEX: 32.49 KG/M2 | HEIGHT: 74 IN | OXYGEN SATURATION: 94 %

## 2025-01-28 DIAGNOSIS — E11.9 TYPE 2 DIABETES MELLITUS WITHOUT COMPLICATION, WITHOUT LONG-TERM CURRENT USE OF INSULIN (HCC): Primary | ICD-10-CM

## 2025-01-28 DIAGNOSIS — E78.5 DYSLIPIDEMIA: ICD-10-CM

## 2025-01-28 DIAGNOSIS — I51.89 GRADE I DIASTOLIC DYSFUNCTION: ICD-10-CM

## 2025-01-28 DIAGNOSIS — I10 PRIMARY HYPERTENSION: ICD-10-CM

## 2025-01-28 DIAGNOSIS — R51.9 CHRONIC NONINTRACTABLE HEADACHE, UNSPECIFIED HEADACHE TYPE: ICD-10-CM

## 2025-01-28 DIAGNOSIS — K76.0 HEPATIC STEATOSIS: ICD-10-CM

## 2025-01-28 DIAGNOSIS — G89.29 CHRONIC NONINTRACTABLE HEADACHE, UNSPECIFIED HEADACHE TYPE: ICD-10-CM

## 2025-01-28 PROCEDURE — 3079F DIAST BP 80-89 MM HG: CPT | Performed by: INTERNAL MEDICINE

## 2025-01-28 PROCEDURE — 3051F HG A1C>EQUAL 7.0%<8.0%: CPT | Performed by: INTERNAL MEDICINE

## 2025-01-28 PROCEDURE — 3075F SYST BP GE 130 - 139MM HG: CPT | Performed by: INTERNAL MEDICINE

## 2025-01-28 PROCEDURE — 99214 OFFICE O/P EST MOD 30 MIN: CPT | Performed by: INTERNAL MEDICINE

## 2025-01-28 RX ORDER — TRAZODONE HYDROCHLORIDE 50 MG/1
50 TABLET, FILM COATED ORAL NIGHTLY
Qty: 90 TABLET | Refills: 3 | Status: SHIPPED | OUTPATIENT
Start: 2025-01-28

## 2025-01-28 RX ORDER — VERAPAMIL HYDROCHLORIDE 180 MG/1
180 TABLET, EXTENDED RELEASE ORAL DAILY
Qty: 90 TABLET | Refills: 3 | Status: SHIPPED | OUTPATIENT
Start: 2025-01-28

## 2025-01-28 RX ORDER — BUTALBITAL, ACETAMINOPHEN AND CAFFEINE 50; 325; 40 MG/1; MG/1; MG/1
1 TABLET ORAL EVERY 4 HOURS PRN
Qty: 60 TABLET | Refills: 3 | Status: SHIPPED | OUTPATIENT
Start: 2025-01-28

## 2025-01-28 RX ORDER — ATORVASTATIN CALCIUM 10 MG/1
10 TABLET, FILM COATED ORAL DAILY
Qty: 90 TABLET | Refills: 3 | Status: SHIPPED | OUTPATIENT
Start: 2025-01-28

## 2025-01-28 NOTE — PROGRESS NOTES
SPORTS MEDICINE AND PRIMARY CARE  Mayank Valadez MD, FACP, CMD  [address] [facility] [facility] [address]  Phone:  [phone]  Fax: [phone]       Chief Complaint   Patient presents with    Follow-up     ED Follow-up   .      SUBJECTIVE:  History of Present Illness         Marcos Tidwell is a 55 y.o. male  patient presents today with a known history of type 2 diabetes mellitus, dyslipidemia, grade 1 diastolic dysfunction, hepatic steatosis, and primary hypertension. He is seen for evaluation.    He reports experiencing right-sided headaches that radiate to the occipital region, persisting for the past 4 days. The onset of his headaches typically occurs between 4:00 and 5:00 in the morning, often disrupting his sleep. He also experiences headaches before bedtime, around 10:00 or 11:00 PM. He has been unable to secure an appointment with a neurologist despite multiple attempts. He admits to being under stress and experiencing sleep disturbances, waking up early even when going to bed early. His sleep duration has decreased from 7 to 9 hours to approximately 5 hours. He sought medical attention at [facility] on 01/24/2025, where he was prescribed Fioricet, Toradol, and Zofran by Dr. Srinivas Keyes. He reports that Fioricet has been effective in alleviating his headache symptoms. He was also advised to follow up with Dr. Bernie Amin, a neurologist. He has been adhering to this regimen and reports that it has been beneficial. He recalls a similar episode of headaches 3 to 4 years ago, for which he was referred to [facility] [place] and was seen by Dr. Alexus Patterson on 11/30/2020. At that time, he was diagnosed with migraine without aura, without status migrainous, and not intractable. He was advised to continue verapamil for headache prophylaxis.    He is currently on a weekly injection for weight management, which has resulted in some weight loss. He reports feeling better overall. His blood pressure

## 2025-01-28 NOTE — PROGRESS NOTES
Chief Complaint   Patient presents with    Follow-up     ED Follow-up     \"Have you been to the ER, urgent care clinic since your last visit?  Hospitalized since your last visit?\"    YES - When: approximately 4 days ago.  Where and Why: [facility] for a headache.    “Have you seen or consulted any other health care providers outside our system since your last visit?”    NO      “Have you had a diabetic eye exam?”    NO     Date of last diabetic eye exam: 11/28/2022

## 2025-02-15 RX ORDER — VERAPAMIL HYDROCHLORIDE 180 MG/1
180 TABLET, EXTENDED RELEASE ORAL DAILY
Qty: 90 TABLET | Refills: 3 | Status: SHIPPED | OUTPATIENT
Start: 2025-02-15

## 2025-03-30 RX ORDER — ATORVASTATIN CALCIUM 10 MG/1
10 TABLET, FILM COATED ORAL DAILY
Qty: 90 TABLET | Refills: 3 | Status: SHIPPED | OUTPATIENT
Start: 2025-03-30

## 2025-03-30 RX ORDER — GLIPIZIDE 10 MG/1
20 TABLET, FILM COATED, EXTENDED RELEASE ORAL DAILY
Qty: 180 TABLET | Refills: 3 | Status: SHIPPED | OUTPATIENT
Start: 2025-03-30

## 2025-03-30 RX ORDER — VERAPAMIL HYDROCHLORIDE 180 MG/1
180 TABLET, FILM COATED, EXTENDED RELEASE ORAL DAILY
Qty: 90 TABLET | Refills: 3 | Status: SHIPPED | OUTPATIENT
Start: 2025-03-30

## 2025-04-29 RX ORDER — METFORMIN HYDROCHLORIDE 500 MG/1
500 TABLET, EXTENDED RELEASE ORAL
Qty: 180 TABLET | Refills: 3 | Status: SHIPPED | OUTPATIENT
Start: 2025-04-29

## 2025-04-29 RX ORDER — LOSARTAN POTASSIUM 100 MG/1
100 TABLET ORAL DAILY
Qty: 90 TABLET | Refills: 3 | Status: SHIPPED | OUTPATIENT
Start: 2025-04-29

## 2025-06-13 RX ORDER — GLIPIZIDE 10 MG/1
20 TABLET, FILM COATED, EXTENDED RELEASE ORAL DAILY
Qty: 180 TABLET | Refills: 3 | Status: SHIPPED | OUTPATIENT
Start: 2025-06-13

## 2025-06-13 RX ORDER — METFORMIN HYDROCHLORIDE 500 MG/1
500 TABLET, EXTENDED RELEASE ORAL
Qty: 180 TABLET | Refills: 3 | Status: SHIPPED | OUTPATIENT
Start: 2025-06-13

## 2025-06-13 RX ORDER — VERAPAMIL HYDROCHLORIDE 180 MG/1
180 TABLET, FILM COATED, EXTENDED RELEASE ORAL DAILY
Qty: 90 TABLET | Refills: 3 | Status: SHIPPED | OUTPATIENT
Start: 2025-06-13

## 2025-08-01 RX ORDER — ATORVASTATIN CALCIUM 10 MG/1
10 TABLET, FILM COATED ORAL DAILY
Qty: 90 TABLET | Refills: 3 | Status: SHIPPED | OUTPATIENT
Start: 2025-08-01

## 2025-08-01 RX ORDER — LOSARTAN POTASSIUM 100 MG/1
100 TABLET ORAL DAILY
Qty: 90 TABLET | Refills: 3 | Status: SHIPPED | OUTPATIENT
Start: 2025-08-01

## 2025-09-02 RX ORDER — VERAPAMIL HYDROCHLORIDE 180 MG/1
180 TABLET, FILM COATED, EXTENDED RELEASE ORAL DAILY
Qty: 90 TABLET | Refills: 1 | Status: SHIPPED | OUTPATIENT
Start: 2025-09-02

## 2025-09-02 RX ORDER — METFORMIN HYDROCHLORIDE 500 MG/1
1000 TABLET, EXTENDED RELEASE ORAL
Qty: 180 TABLET | Refills: 1 | Status: SHIPPED | OUTPATIENT
Start: 2025-09-02

## (undated) DEVICE — KENDALL RADIOLUCENT FOAM MONITORING ELECTRODE RECTANGULAR SHAPE: Brand: KENDALL

## (undated) DEVICE — CATH IV AUTOGRD BC PNK 20GA 25 -- INSYTE

## (undated) DEVICE — Device

## (undated) DEVICE — Z DISCONTINUED PER MEDLINE LINE GAS SAMPLING O2/CO2 LNG AD 13 FT NSL W/ TBNG FILTERLINE

## (undated) DEVICE — SYR 10ML LUER LOK 1/5ML GRAD --

## (undated) DEVICE — SET ADMIN 16ML TBNG L100IN 2 Y INJ SITE IV PIGGY BK DISP

## (undated) DEVICE — NEONATAL-ADULT SPO2 SENSOR: Brand: NELLCOR

## (undated) DEVICE — SOLIDIFIER MEDC 1200ML -- CONVERT TO 356117

## (undated) DEVICE — NEEDLE HYPO 18GA L1.5IN PNK S STL HUB POLYPR SHLD REG BVL

## (undated) DEVICE — TOWEL 4 PLY TISS 19X30 SUE WHT

## (undated) DEVICE — SYR 3ML LL TIP 1/10ML GRAD --

## (undated) DEVICE — BASIN EMSIS 16OZ GRAPHITE PLAS KID SHP MOLD GRAD FOR ORAL

## (undated) DEVICE — 1200 GUARD II KIT W/5MM TUBE W/O VAC TUBE: Brand: GUARDIAN